# Patient Record
Sex: FEMALE | URBAN - METROPOLITAN AREA
[De-identification: names, ages, dates, MRNs, and addresses within clinical notes are randomized per-mention and may not be internally consistent; named-entity substitution may affect disease eponyms.]

---

## 2019-12-16 ENCOUNTER — EMERGENCY (EMERGENCY)
Facility: HOSPITAL | Age: 64
LOS: 1 days | Discharge: LEFT BEFORE DISCHARGE | End: 2019-12-16
Attending: EMERGENCY MEDICINE

## 2019-12-16 VITALS
HEART RATE: 116 BPM | OXYGEN SATURATION: 96 % | HEIGHT: 64 IN | SYSTOLIC BLOOD PRESSURE: 157 MMHG | WEIGHT: 130.07 LBS | RESPIRATION RATE: 20 BRPM | TEMPERATURE: 97 F | DIASTOLIC BLOOD PRESSURE: 91 MMHG

## 2019-12-16 NOTE — ED ADULT TRIAGE NOTE - CHIEF COMPLAINT QUOTE
Pt A&Ox4 states "It just started out all of a sudden."  BIBA c/o asthma exasperation. Patient given 2 duonebs and 125mg of solumedrol by EMS #18 to LAC from EMS. Patient denies COPD, Denies chest pain, patient states improvement, appears in no acute distress.

## 2022-12-13 ENCOUNTER — NON-APPOINTMENT (OUTPATIENT)
Age: 67
End: 2022-12-13

## 2023-03-05 ENCOUNTER — NON-APPOINTMENT (OUTPATIENT)
Age: 68
End: 2023-03-05

## 2023-04-18 ENCOUNTER — NON-APPOINTMENT (OUTPATIENT)
Age: 68
End: 2023-04-18

## 2023-07-19 ENCOUNTER — NON-APPOINTMENT (OUTPATIENT)
Age: 68
End: 2023-07-19

## 2024-01-01 ENCOUNTER — INPATIENT (INPATIENT)
Facility: HOSPITAL | Age: 69
LOS: 2 days | Discharge: HOME HEALTH | DRG: 189 | End: 2024-12-07
Attending: GENERAL ACUTE CARE HOSPITAL | Admitting: STUDENT IN AN ORGANIZED HEALTH CARE EDUCATION/TRAINING PROGRAM
Payer: COMMERCIAL

## 2024-01-01 ENCOUNTER — OUTPATIENT (OUTPATIENT)
Dept: OUTPATIENT SERVICES | Facility: HOSPITAL | Age: 69
LOS: 1 days | Discharge: HOME | End: 2024-01-01

## 2024-01-01 VITALS
OXYGEN SATURATION: 96 % | RESPIRATION RATE: 16 BRPM | DIASTOLIC BLOOD PRESSURE: 93 MMHG | HEART RATE: 79 BPM | TEMPERATURE: 98 F | SYSTOLIC BLOOD PRESSURE: 130 MMHG | HEIGHT: 63 IN

## 2024-01-01 VITALS
RESPIRATION RATE: 18 BRPM | SYSTOLIC BLOOD PRESSURE: 132 MMHG | HEART RATE: 92 BPM | TEMPERATURE: 98 F | DIASTOLIC BLOOD PRESSURE: 78 MMHG | OXYGEN SATURATION: 97 %

## 2024-01-01 DIAGNOSIS — J44.1 CHRONIC OBSTRUCTIVE PULMONARY DISEASE WITH (ACUTE) EXACERBATION: ICD-10-CM

## 2024-01-01 DIAGNOSIS — Z98.890 OTHER SPECIFIED POSTPROCEDURAL STATES: Chronic | ICD-10-CM

## 2024-01-01 DIAGNOSIS — D64.9 ANEMIA, UNSPECIFIED: ICD-10-CM

## 2024-01-01 LAB
ALBUMIN SERPL ELPH-MCNC: 3.2 G/DL — LOW (ref 3.3–5.2)
ALBUMIN SERPL ELPH-MCNC: 3.3 G/DL — SIGNIFICANT CHANGE UP (ref 3.3–5.2)
ALBUMIN SERPL ELPH-MCNC: 3.3 G/DL — SIGNIFICANT CHANGE UP (ref 3.3–5.2)
ALP SERPL-CCNC: 76 U/L — SIGNIFICANT CHANGE UP (ref 40–120)
ALP SERPL-CCNC: 78 U/L — SIGNIFICANT CHANGE UP (ref 40–120)
ALP SERPL-CCNC: 88 U/L — SIGNIFICANT CHANGE UP (ref 40–120)
ALT FLD-CCNC: 11 U/L — SIGNIFICANT CHANGE UP
ALT FLD-CCNC: 12 U/L — SIGNIFICANT CHANGE UP
ALT FLD-CCNC: 13 U/L — SIGNIFICANT CHANGE UP
ANION GAP SERPL CALC-SCNC: 11 MMOL/L — SIGNIFICANT CHANGE UP (ref 5–17)
ANION GAP SERPL CALC-SCNC: 12 MMOL/L — SIGNIFICANT CHANGE UP (ref 5–17)
ANION GAP SERPL CALC-SCNC: 15 MMOL/L — SIGNIFICANT CHANGE UP (ref 5–17)
ANISOCYTOSIS BLD QL: SLIGHT — SIGNIFICANT CHANGE UP
APTT BLD: 27.9 SEC — SIGNIFICANT CHANGE UP (ref 24.5–35.6)
AST SERPL-CCNC: 13 U/L — SIGNIFICANT CHANGE UP
AST SERPL-CCNC: 19 U/L — SIGNIFICANT CHANGE UP
AST SERPL-CCNC: 24 U/L — SIGNIFICANT CHANGE UP
BASOPHILS # BLD AUTO: 0.01 K/UL — SIGNIFICANT CHANGE UP (ref 0–0.2)
BASOPHILS # BLD AUTO: 0.01 K/UL — SIGNIFICANT CHANGE UP (ref 0–0.2)
BASOPHILS # BLD AUTO: 0.09 K/UL — SIGNIFICANT CHANGE UP (ref 0–0.2)
BASOPHILS NFR BLD AUTO: 0.1 % — SIGNIFICANT CHANGE UP (ref 0–2)
BASOPHILS NFR BLD AUTO: 0.1 % — SIGNIFICANT CHANGE UP (ref 0–2)
BASOPHILS NFR BLD AUTO: 0.9 % — SIGNIFICANT CHANGE UP (ref 0–2)
BILIRUB SERPL-MCNC: 0.3 MG/DL — LOW (ref 0.4–2)
BLD GP AB SCN SERPL QL: SIGNIFICANT CHANGE UP
BUN SERPL-MCNC: 15.5 MG/DL — SIGNIFICANT CHANGE UP (ref 8–20)
BUN SERPL-MCNC: 26.7 MG/DL — HIGH (ref 8–20)
BUN SERPL-MCNC: 28 MG/DL — HIGH (ref 8–20)
CALCIUM SERPL-MCNC: 9.1 MG/DL — SIGNIFICANT CHANGE UP (ref 8.4–10.5)
CALCIUM SERPL-MCNC: 9.4 MG/DL — SIGNIFICANT CHANGE UP (ref 8.4–10.5)
CALCIUM SERPL-MCNC: 9.5 MG/DL — SIGNIFICANT CHANGE UP (ref 8.4–10.5)
CHLORIDE SERPL-SCNC: 101 MMOL/L — SIGNIFICANT CHANGE UP (ref 96–108)
CHLORIDE SERPL-SCNC: 95 MMOL/L — LOW (ref 96–108)
CHLORIDE SERPL-SCNC: 98 MMOL/L — SIGNIFICANT CHANGE UP (ref 96–108)
CO2 SERPL-SCNC: 18 MMOL/L — LOW (ref 22–29)
CO2 SERPL-SCNC: 21 MMOL/L — LOW (ref 22–29)
CO2 SERPL-SCNC: 22 MMOL/L — SIGNIFICANT CHANGE UP (ref 22–29)
CREAT SERPL-MCNC: 0.49 MG/DL — LOW (ref 0.5–1.3)
CREAT SERPL-MCNC: 0.6 MG/DL — SIGNIFICANT CHANGE UP (ref 0.5–1.3)
CREAT SERPL-MCNC: 0.69 MG/DL — SIGNIFICANT CHANGE UP (ref 0.5–1.3)
CULTURE RESULTS: SIGNIFICANT CHANGE UP
DACRYOCYTES BLD QL SMEAR: SLIGHT — SIGNIFICANT CHANGE UP
EGFR: 101 ML/MIN/1.73M2 — SIGNIFICANT CHANGE UP
EGFR: 101 ML/MIN/1.73M2 — SIGNIFICANT CHANGE UP
EGFR: 93 ML/MIN/1.73M2 — SIGNIFICANT CHANGE UP
EGFR: 93 ML/MIN/1.73M2 — SIGNIFICANT CHANGE UP
EGFR: 96 ML/MIN/1.73M2 — SIGNIFICANT CHANGE UP
EGFR: 96 ML/MIN/1.73M2 — SIGNIFICANT CHANGE UP
EOSINOPHIL # BLD AUTO: 0 K/UL — SIGNIFICANT CHANGE UP (ref 0–0.5)
EOSINOPHIL NFR BLD AUTO: 0 % — SIGNIFICANT CHANGE UP (ref 0–6)
FLUAV AG NPH QL: SIGNIFICANT CHANGE UP
FLUBV AG NPH QL: SIGNIFICANT CHANGE UP
GAS PNL BLDA: SIGNIFICANT CHANGE UP
GLUCOSE SERPL-MCNC: 129 MG/DL — HIGH (ref 70–99)
GLUCOSE SERPL-MCNC: 156 MG/DL — HIGH (ref 70–99)
GLUCOSE SERPL-MCNC: 303 MG/DL — HIGH (ref 70–99)
HCT VFR BLD CALC: 28.4 % — LOW (ref 34.5–45)
HCT VFR BLD CALC: 29.6 % — LOW (ref 34.5–45)
HCT VFR BLD CALC: 32 % — LOW (ref 34.5–45)
HGB BLD-MCNC: 10 G/DL — LOW (ref 11.5–15.5)
HGB BLD-MCNC: 10.7 G/DL — LOW (ref 11.5–15.5)
HGB BLD-MCNC: 9.8 G/DL — LOW (ref 11.5–15.5)
IMM GRANULOCYTES NFR BLD AUTO: 1 % — HIGH (ref 0–0.9)
IMM GRANULOCYTES NFR BLD AUTO: 1.3 % — HIGH (ref 0–0.9)
INR BLD: 1.03 RATIO — SIGNIFICANT CHANGE UP (ref 0.85–1.16)
LEGIONELLA AG UR QL: NEGATIVE — SIGNIFICANT CHANGE UP
LYMPHOCYTES # BLD AUTO: 0.92 K/UL — LOW (ref 1–3.3)
LYMPHOCYTES # BLD AUTO: 1.14 K/UL — SIGNIFICANT CHANGE UP (ref 1–3.3)
LYMPHOCYTES # BLD AUTO: 10.9 % — LOW (ref 13–44)
LYMPHOCYTES # BLD AUTO: 11.5 % — LOW (ref 13–44)
LYMPHOCYTES # BLD AUTO: 4.42 K/UL — HIGH (ref 1–3.3)
LYMPHOCYTES # BLD AUTO: 45.2 % — HIGH (ref 13–44)
MANUAL SMEAR VERIFICATION: SIGNIFICANT CHANGE UP
MCHC RBC-ENTMCNC: 31.3 PG — SIGNIFICANT CHANGE UP (ref 27–34)
MCHC RBC-ENTMCNC: 31.8 PG — SIGNIFICANT CHANGE UP (ref 27–34)
MCHC RBC-ENTMCNC: 32.3 PG — SIGNIFICANT CHANGE UP (ref 27–34)
MCHC RBC-ENTMCNC: 33.4 G/DL — SIGNIFICANT CHANGE UP (ref 32–36)
MCHC RBC-ENTMCNC: 33.8 G/DL — SIGNIFICANT CHANGE UP (ref 32–36)
MCHC RBC-ENTMCNC: 34.5 G/DL — SIGNIFICANT CHANGE UP (ref 32–36)
MCV RBC AUTO: 92.5 FL — SIGNIFICANT CHANGE UP (ref 80–100)
MCV RBC AUTO: 93.7 FL — SIGNIFICANT CHANGE UP (ref 80–100)
MCV RBC AUTO: 95.2 FL — SIGNIFICANT CHANGE UP (ref 80–100)
MONOCYTES # BLD AUTO: 0.59 K/UL — SIGNIFICANT CHANGE UP (ref 0–0.9)
MONOCYTES # BLD AUTO: 0.68 K/UL — SIGNIFICANT CHANGE UP (ref 0–0.9)
MONOCYTES # BLD AUTO: 0.84 K/UL — SIGNIFICANT CHANGE UP (ref 0–0.9)
MONOCYTES NFR BLD AUTO: 7 % — SIGNIFICANT CHANGE UP (ref 2–14)
MONOCYTES NFR BLD AUTO: 7.4 % — SIGNIFICANT CHANGE UP (ref 2–14)
MONOCYTES NFR BLD AUTO: 8 % — SIGNIFICANT CHANGE UP (ref 2–14)
MRSA PCR RESULT.: SIGNIFICANT CHANGE UP
NEUTROPHILS # BLD AUTO: 3.99 K/UL — SIGNIFICANT CHANGE UP (ref 1.8–7.4)
NEUTROPHILS # BLD AUTO: 6.39 K/UL — SIGNIFICANT CHANGE UP (ref 1.8–7.4)
NEUTROPHILS # BLD AUTO: 8.33 K/UL — HIGH (ref 1.8–7.4)
NEUTROPHILS NFR BLD AUTO: 39.1 % — LOW (ref 43–77)
NEUTROPHILS NFR BLD AUTO: 79.7 % — HIGH (ref 43–77)
NEUTROPHILS NFR BLD AUTO: 80 % — HIGH (ref 43–77)
NEUTS BAND # BLD: 1.7 % — SIGNIFICANT CHANGE UP (ref 0–8)
NEUTS BAND NFR BLD: 1.7 % — SIGNIFICANT CHANGE UP (ref 0–8)
NT-PROBNP SERPL-SCNC: 1159 PG/ML — HIGH (ref 0–300)
OVALOCYTES BLD QL SMEAR: SLIGHT — SIGNIFICANT CHANGE UP
PLAT MORPH BLD: NORMAL — SIGNIFICANT CHANGE UP
PLATELET # BLD AUTO: 32 K/UL — LOW (ref 150–400)
PLATELET # BLD AUTO: 41 K/UL — LOW (ref 150–400)
PLATELET # BLD AUTO: 54 K/UL — LOW (ref 150–400)
POIKILOCYTOSIS BLD QL AUTO: SLIGHT — SIGNIFICANT CHANGE UP
POLYCHROMASIA BLD QL SMEAR: SLIGHT — SIGNIFICANT CHANGE UP
POTASSIUM SERPL-MCNC: 4.8 MMOL/L — SIGNIFICANT CHANGE UP (ref 3.5–5.3)
POTASSIUM SERPL-MCNC: 4.9 MMOL/L — SIGNIFICANT CHANGE UP (ref 3.5–5.3)
POTASSIUM SERPL-MCNC: 5.2 MMOL/L — SIGNIFICANT CHANGE UP (ref 3.5–5.3)
POTASSIUM SERPL-SCNC: 4.8 MMOL/L — SIGNIFICANT CHANGE UP (ref 3.5–5.3)
POTASSIUM SERPL-SCNC: 4.9 MMOL/L — SIGNIFICANT CHANGE UP (ref 3.5–5.3)
POTASSIUM SERPL-SCNC: 5.2 MMOL/L — SIGNIFICANT CHANGE UP (ref 3.5–5.3)
PROT SERPL-MCNC: 6 G/DL — LOW (ref 6.6–8.7)
PROT SERPL-MCNC: 6.5 G/DL — LOW (ref 6.6–8.7)
PROT SERPL-MCNC: 6.6 G/DL — SIGNIFICANT CHANGE UP (ref 6.6–8.7)
PROTHROM AB SERPL-ACNC: 11.6 SEC — SIGNIFICANT CHANGE UP (ref 9.9–13.4)
RBC # BLD: 3.03 M/UL — LOW (ref 3.8–5.2)
RBC # BLD: 3.2 M/UL — LOW (ref 3.8–5.2)
RBC # BLD: 3.36 M/UL — LOW (ref 3.8–5.2)
RBC # FLD: 21.5 % — HIGH (ref 10.3–14.5)
RBC # FLD: 21.6 % — HIGH (ref 10.3–14.5)
RBC # FLD: 21.9 % — HIGH (ref 10.3–14.5)
RBC BLD AUTO: ABNORMAL
RSV RNA NPH QL NAA+NON-PROBE: SIGNIFICANT CHANGE UP
S AUREUS DNA NOSE QL NAA+PROBE: SIGNIFICANT CHANGE UP
S PNEUM AG UR QL: NEGATIVE — SIGNIFICANT CHANGE UP
SARS-COV-2 RNA SPEC QL NAA+PROBE: SIGNIFICANT CHANGE UP
SODIUM SERPL-SCNC: 128 MMOL/L — LOW (ref 135–145)
SODIUM SERPL-SCNC: 132 MMOL/L — LOW (ref 135–145)
SODIUM SERPL-SCNC: 133 MMOL/L — LOW (ref 135–145)
SPECIMEN SOURCE: SIGNIFICANT CHANGE UP
TROPONIN T, HIGH SENSITIVITY RESULT: 32 NG/L — SIGNIFICANT CHANGE UP (ref 0–51)
VARIANT LYMPHS # BLD: 6.1 % — HIGH (ref 0–6)
VARIANT LYMPHS NFR BLD MANUAL: 6.1 % — HIGH (ref 0–6)
WBC # BLD: 10.46 K/UL — SIGNIFICANT CHANGE UP (ref 3.8–10.5)
WBC # BLD: 7.99 K/UL — SIGNIFICANT CHANGE UP (ref 3.8–10.5)
WBC # BLD: 9.78 K/UL — SIGNIFICANT CHANGE UP (ref 3.8–10.5)
WBC # FLD AUTO: 10.46 K/UL — SIGNIFICANT CHANGE UP (ref 3.8–10.5)
WBC # FLD AUTO: 7.99 K/UL — SIGNIFICANT CHANGE UP (ref 3.8–10.5)
WBC # FLD AUTO: 9.78 K/UL — SIGNIFICANT CHANGE UP (ref 3.8–10.5)

## 2024-01-01 PROCEDURE — 94640 AIRWAY INHALATION TREATMENT: CPT

## 2024-01-01 PROCEDURE — 87040 BLOOD CULTURE FOR BACTERIA: CPT

## 2024-01-01 PROCEDURE — 87641 MR-STAPH DNA AMP PROBE: CPT

## 2024-01-01 PROCEDURE — 86850 RBC ANTIBODY SCREEN: CPT

## 2024-01-01 PROCEDURE — 82947 ASSAY GLUCOSE BLOOD QUANT: CPT

## 2024-01-01 PROCEDURE — 94760 N-INVAS EAR/PLS OXIMETRY 1: CPT

## 2024-01-01 PROCEDURE — 71045 X-RAY EXAM CHEST 1 VIEW: CPT | Mod: 26

## 2024-01-01 PROCEDURE — 83605 ASSAY OF LACTIC ACID: CPT

## 2024-01-01 PROCEDURE — 84295 ASSAY OF SERUM SODIUM: CPT

## 2024-01-01 PROCEDURE — 76705 ECHO EXAM OF ABDOMEN: CPT | Mod: 26

## 2024-01-01 PROCEDURE — 99223 1ST HOSP IP/OBS HIGH 75: CPT

## 2024-01-01 PROCEDURE — 86901 BLOOD TYPING SEROLOGIC RH(D): CPT

## 2024-01-01 PROCEDURE — 85730 THROMBOPLASTIN TIME PARTIAL: CPT

## 2024-01-01 PROCEDURE — 87899 AGENT NOS ASSAY W/OPTIC: CPT

## 2024-01-01 PROCEDURE — 87640 STAPH A DNA AMP PROBE: CPT

## 2024-01-01 PROCEDURE — 99222 1ST HOSP IP/OBS MODERATE 55: CPT | Mod: GC

## 2024-01-01 PROCEDURE — 71045 X-RAY EXAM CHEST 1 VIEW: CPT

## 2024-01-01 PROCEDURE — 85025 COMPLETE CBC W/AUTO DIFF WBC: CPT

## 2024-01-01 PROCEDURE — 94660 CPAP INITIATION&MGMT: CPT

## 2024-01-01 PROCEDURE — 99239 HOSP IP/OBS DSCHRG MGMT >30: CPT

## 2024-01-01 PROCEDURE — 76705 ECHO EXAM OF ABDOMEN: CPT

## 2024-01-01 PROCEDURE — 71046 X-RAY EXAM CHEST 2 VIEWS: CPT

## 2024-01-01 PROCEDURE — 96375 TX/PRO/DX INJ NEW DRUG ADDON: CPT

## 2024-01-01 PROCEDURE — 82435 ASSAY OF BLOOD CHLORIDE: CPT

## 2024-01-01 PROCEDURE — 82803 BLOOD GASES ANY COMBINATION: CPT

## 2024-01-01 PROCEDURE — 80053 COMPREHEN METABOLIC PANEL: CPT

## 2024-01-01 PROCEDURE — 86900 BLOOD TYPING SEROLOGIC ABO: CPT

## 2024-01-01 PROCEDURE — 87449 NOS EACH ORGANISM AG IA: CPT

## 2024-01-01 PROCEDURE — 85014 HEMATOCRIT: CPT

## 2024-01-01 PROCEDURE — 83880 ASSAY OF NATRIURETIC PEPTIDE: CPT

## 2024-01-01 PROCEDURE — 71046 X-RAY EXAM CHEST 2 VIEWS: CPT | Mod: 26

## 2024-01-01 PROCEDURE — 99291 CRITICAL CARE FIRST HOUR: CPT

## 2024-01-01 PROCEDURE — 99233 SBSQ HOSP IP/OBS HIGH 50: CPT

## 2024-01-01 PROCEDURE — 36600 WITHDRAWAL OF ARTERIAL BLOOD: CPT

## 2024-01-01 PROCEDURE — 99232 SBSQ HOSP IP/OBS MODERATE 35: CPT

## 2024-01-01 PROCEDURE — 87637 SARSCOV2&INF A&B&RSV AMP PRB: CPT

## 2024-01-01 PROCEDURE — 84484 ASSAY OF TROPONIN QUANT: CPT

## 2024-01-01 PROCEDURE — 93005 ELECTROCARDIOGRAM TRACING: CPT

## 2024-01-01 PROCEDURE — 96374 THER/PROPH/DIAG INJ IV PUSH: CPT

## 2024-01-01 PROCEDURE — 82330 ASSAY OF CALCIUM: CPT

## 2024-01-01 PROCEDURE — 85018 HEMOGLOBIN: CPT

## 2024-01-01 PROCEDURE — 84132 ASSAY OF SERUM POTASSIUM: CPT

## 2024-01-01 PROCEDURE — 85610 PROTHROMBIN TIME: CPT

## 2024-01-01 PROCEDURE — 36415 COLL VENOUS BLD VENIPUNCTURE: CPT

## 2024-01-01 RX ORDER — METHYLPREDNISOLONE ACETATE 80 MG/ML
40 INJECTION, SUSPENSION INTRA-ARTICULAR; INTRALESIONAL; INTRAMUSCULAR; SOFT TISSUE EVERY 12 HOURS
Refills: 0 | Status: DISCONTINUED | OUTPATIENT
Start: 2024-01-01 | End: 2024-01-01

## 2024-01-01 RX ORDER — PREDNISONE 20 MG/1
40 TABLET ORAL DAILY
Refills: 0 | Status: DISCONTINUED | OUTPATIENT
Start: 2024-01-01 | End: 2024-01-01

## 2024-01-01 RX ORDER — NICOTINE POLACRILEX 4 MG/1
1 GUM, CHEWING ORAL DAILY
Refills: 0 | Status: DISCONTINUED | OUTPATIENT
Start: 2024-01-01 | End: 2024-01-01

## 2024-01-01 RX ORDER — AMOXICILLIN AND CLAVULANATE POTASSIUM 500; 125 MG/1; MG/1
1 TABLET, FILM COATED ORAL
Qty: 12 | Refills: 0
Start: 2024-01-01 | End: 2024-01-01

## 2024-01-01 RX ORDER — PREDNISONE 20 MG/1
1 TABLET ORAL
Qty: 30 | Refills: 0
Start: 2024-01-01 | End: 2024-01-01

## 2024-01-01 RX ORDER — METHYLPREDNISOLONE ACETATE 80 MG/ML
40 INJECTION, SUSPENSION INTRA-ARTICULAR; INTRALESIONAL; INTRAMUSCULAR; SOFT TISSUE EVERY 8 HOURS
Refills: 0 | Status: DISCONTINUED | OUTPATIENT
Start: 2024-01-01 | End: 2024-01-01

## 2024-01-01 RX ORDER — AZITHROMYCIN 250 MG
500 CAPSULE ORAL ONCE
Refills: 0 | Status: COMPLETED | OUTPATIENT
Start: 2024-01-01 | End: 2024-01-01

## 2024-01-01 RX ORDER — LOSARTAN POTASSIUM 100 MG/1
12.5 TABLET, FILM COATED ORAL DAILY
Refills: 0 | Status: DISCONTINUED | OUTPATIENT
Start: 2024-01-01 | End: 2024-01-01

## 2024-01-01 RX ORDER — PREDNISONE 20 MG/1
20 TABLET ORAL DAILY
Refills: 0 | Status: CANCELLED | OUTPATIENT
Start: 2024-01-01 | End: 2024-01-01

## 2024-01-01 RX ORDER — METHYLPREDNISOLONE ACETATE 80 MG/ML
125 INJECTION, SUSPENSION INTRA-ARTICULAR; INTRALESIONAL; INTRAMUSCULAR; SOFT TISSUE ONCE
Refills: 0 | Status: COMPLETED | OUTPATIENT
Start: 2024-01-01 | End: 2024-01-01

## 2024-01-01 RX ORDER — MAGNESIUM SULFATE 500 MG/ML
2 SYRINGE (ML) INJECTION ONCE
Refills: 0 | Status: COMPLETED | OUTPATIENT
Start: 2024-01-01 | End: 2024-01-01

## 2024-01-01 RX ORDER — AZITHROMYCIN 250 MG
1 CAPSULE ORAL
Qty: 4 | Refills: 0
Start: 2024-01-01 | End: 2024-01-01

## 2024-01-01 RX ORDER — TIOTROPIUM BROMIDE INHALATION SPRAY 3.12 UG/1
2 SPRAY, METERED RESPIRATORY (INHALATION)
Qty: 1 | Refills: 0
Start: 2024-01-01 | End: 2025-01-01

## 2024-01-01 RX ORDER — ENOXAPARIN SODIUM 100 MG/ML
40 INJECTION SUBCUTANEOUS EVERY 24 HOURS
Refills: 0 | Status: DISCONTINUED | OUTPATIENT
Start: 2024-01-01 | End: 2024-01-01

## 2024-01-01 RX ORDER — ALPRAZOLAM 0.5 MG
0.25 TABLET, EXTENDED RELEASE 24 HR ORAL ONCE
Refills: 0 | Status: DISCONTINUED | OUTPATIENT
Start: 2024-01-01 | End: 2024-01-01

## 2024-01-01 RX ORDER — IPRATROPIUM BROMIDE AND ALBUTEROL SULFATE .5; 2.5 MG/3ML; MG/3ML
3 SOLUTION RESPIRATORY (INHALATION)
Qty: 168 | Refills: 0
Start: 2024-01-01 | End: 2024-01-01

## 2024-01-01 RX ORDER — IPRATROPIUM BROMIDE AND ALBUTEROL SULFATE .5; 2.5 MG/3ML; MG/3ML
3 SOLUTION RESPIRATORY (INHALATION) ONCE
Refills: 0 | Status: COMPLETED | OUTPATIENT
Start: 2024-01-01 | End: 2024-01-01

## 2024-01-01 RX ORDER — TIOTROPIUM BROMIDE INHALATION SPRAY 3.12 UG/1
2 SPRAY, METERED RESPIRATORY (INHALATION) DAILY
Refills: 0 | Status: DISCONTINUED | OUTPATIENT
Start: 2024-01-01 | End: 2024-01-01

## 2024-01-01 RX ORDER — IPRATROPIUM BROMIDE AND ALBUTEROL SULFATE .5; 2.5 MG/3ML; MG/3ML
9 SOLUTION RESPIRATORY (INHALATION) ONCE
Refills: 0 | Status: DISCONTINUED | OUTPATIENT
Start: 2024-01-01 | End: 2024-01-01

## 2024-01-01 RX ORDER — CEFEPIME 2 G/20ML
2000 INJECTION, POWDER, FOR SOLUTION INTRAVENOUS EVERY 12 HOURS
Refills: 0 | Status: DISCONTINUED | OUTPATIENT
Start: 2024-01-01 | End: 2024-01-01

## 2024-01-01 RX ORDER — PREDNISONE 20 MG/1
30 TABLET ORAL DAILY
Refills: 0 | Status: CANCELLED | OUTPATIENT
Start: 2024-01-01 | End: 2024-01-01

## 2024-01-01 RX ORDER — CEFPODOXIME PROXETIL 200 MG/1
1 TABLET, FILM COATED ORAL
Qty: 12 | Refills: 0
Start: 2024-01-01 | End: 2024-01-01

## 2024-01-01 RX ORDER — ALPRAZOLAM 0.5 MG
1 TABLET, EXTENDED RELEASE 24 HR ORAL
Qty: 3 | Refills: 0
Start: 2024-01-01 | End: 2024-01-01

## 2024-01-01 RX ORDER — IPRATROPIUM BROMIDE AND ALBUTEROL SULFATE .5; 2.5 MG/3ML; MG/3ML
3 SOLUTION RESPIRATORY (INHALATION) EVERY 6 HOURS
Refills: 0 | Status: DISCONTINUED | OUTPATIENT
Start: 2024-01-01 | End: 2024-01-01

## 2024-01-01 RX ORDER — PREDNISONE 20 MG/1
TABLET ORAL
Refills: 0 | Status: DISCONTINUED | OUTPATIENT
Start: 2024-01-01 | End: 2024-01-01

## 2024-01-01 RX ORDER — ONDANSETRON HCL/PF 4 MG/2 ML
4 VIAL (ML) INJECTION EVERY 8 HOURS
Refills: 0 | Status: DISCONTINUED | OUTPATIENT
Start: 2024-01-01 | End: 2024-01-01

## 2024-01-01 RX ORDER — SPIRONOLACTONE 25 MG
12.5 TABLET ORAL DAILY
Refills: 0 | Status: DISCONTINUED | OUTPATIENT
Start: 2024-01-01 | End: 2024-01-01

## 2024-01-01 RX ORDER — CARVEDILOL 3.12 MG/1
6.25 TABLET, FILM COATED ORAL EVERY 12 HOURS
Refills: 0 | Status: DISCONTINUED | OUTPATIENT
Start: 2024-01-01 | End: 2024-01-01

## 2024-01-01 RX ORDER — ALBUTEROL SULFATE 2.5 MG/3ML
2 VIAL, NEBULIZER (ML) INHALATION EVERY 6 HOURS
Refills: 0 | Status: DISCONTINUED | OUTPATIENT
Start: 2024-01-01 | End: 2024-01-01

## 2024-01-01 RX ORDER — BUDESONIDE AND FORMOTEROL FUMARATE DIHYDRATE 80; 4.5 UG/1; UG/1
2 AEROSOL RESPIRATORY (INHALATION)
Qty: 1 | Refills: 0
Start: 2024-01-01 | End: 2025-01-01

## 2024-01-01 RX ORDER — PREDNISONE 20 MG/1
10 TABLET ORAL DAILY
Refills: 0 | Status: CANCELLED | OUTPATIENT
Start: 2024-01-01 | End: 2024-01-01

## 2024-01-01 RX ORDER — ACETAMINOPHEN 500 MG/5ML
650 LIQUID (ML) ORAL EVERY 6 HOURS
Refills: 0 | Status: DISCONTINUED | OUTPATIENT
Start: 2024-01-01 | End: 2024-01-01

## 2024-01-01 RX ORDER — CEFEPIME 2 G/20ML
2000 INJECTION, POWDER, FOR SOLUTION INTRAVENOUS ONCE
Refills: 0 | Status: COMPLETED | OUTPATIENT
Start: 2024-01-01 | End: 2024-01-01

## 2024-01-01 RX ORDER — VANCOMYCIN HCL IN 5 % DEXTROSE 1.5G/250ML
1000 PLASTIC BAG, INJECTION (ML) INTRAVENOUS ONCE
Refills: 0 | Status: COMPLETED | OUTPATIENT
Start: 2024-01-01 | End: 2024-01-01

## 2024-01-01 RX ORDER — MELATONIN 5 MG
3 TABLET ORAL AT BEDTIME
Refills: 0 | Status: DISCONTINUED | OUTPATIENT
Start: 2024-01-01 | End: 2024-01-01

## 2024-01-01 RX ADMIN — Medication 250 MILLIGRAM(S): at 09:58

## 2024-01-01 RX ADMIN — Medication 0.25 MILLIGRAM(S): at 21:24

## 2024-01-01 RX ADMIN — Medication 1 DOSE(S): at 10:46

## 2024-01-01 RX ADMIN — METHYLPREDNISOLONE ACETATE 40 MILLIGRAM(S): 80 INJECTION, SUSPENSION INTRA-ARTICULAR; INTRALESIONAL; INTRAMUSCULAR; SOFT TISSUE at 17:22

## 2024-01-01 RX ADMIN — CARVEDILOL 6.25 MILLIGRAM(S): 3.12 TABLET, FILM COATED ORAL at 17:27

## 2024-01-01 RX ADMIN — CARVEDILOL 6.25 MILLIGRAM(S): 3.12 TABLET, FILM COATED ORAL at 05:52

## 2024-01-01 RX ADMIN — ENOXAPARIN SODIUM 40 MILLIGRAM(S): 100 INJECTION SUBCUTANEOUS at 11:22

## 2024-01-01 RX ADMIN — Medication 12.5 MILLIGRAM(S): at 05:38

## 2024-01-01 RX ADMIN — METHYLPREDNISOLONE ACETATE 40 MILLIGRAM(S): 80 INJECTION, SUSPENSION INTRA-ARTICULAR; INTRALESIONAL; INTRAMUSCULAR; SOFT TISSUE at 05:51

## 2024-01-01 RX ADMIN — LOSARTAN POTASSIUM 12.5 MILLIGRAM(S): 100 TABLET, FILM COATED ORAL at 05:38

## 2024-01-01 RX ADMIN — CEFEPIME 2000 MILLIGRAM(S): 2 INJECTION, POWDER, FOR SOLUTION INTRAVENOUS at 05:51

## 2024-01-01 RX ADMIN — CARVEDILOL 6.25 MILLIGRAM(S): 3.12 TABLET, FILM COATED ORAL at 17:22

## 2024-01-01 RX ADMIN — NICOTINE POLACRILEX 1 PATCH: 4 GUM, CHEWING ORAL at 12:03

## 2024-01-01 RX ADMIN — PREDNISONE 40 MILLIGRAM(S): 20 TABLET ORAL at 05:18

## 2024-01-01 RX ADMIN — IPRATROPIUM BROMIDE AND ALBUTEROL SULFATE 3 MILLILITER(S): .5; 2.5 SOLUTION RESPIRATORY (INHALATION) at 16:02

## 2024-01-01 RX ADMIN — NICOTINE POLACRILEX 1 PATCH: 4 GUM, CHEWING ORAL at 19:40

## 2024-01-01 RX ADMIN — NICOTINE POLACRILEX 1 PATCH: 4 GUM, CHEWING ORAL at 07:33

## 2024-01-01 RX ADMIN — IPRATROPIUM BROMIDE AND ALBUTEROL SULFATE 3 MILLILITER(S): .5; 2.5 SOLUTION RESPIRATORY (INHALATION) at 08:53

## 2024-01-01 RX ADMIN — CEFEPIME 2000 MILLIGRAM(S): 2 INJECTION, POWDER, FOR SOLUTION INTRAVENOUS at 18:34

## 2024-01-01 RX ADMIN — METHYLPREDNISOLONE ACETATE 40 MILLIGRAM(S): 80 INJECTION, SUSPENSION INTRA-ARTICULAR; INTRALESIONAL; INTRAMUSCULAR; SOFT TISSUE at 12:36

## 2024-01-01 RX ADMIN — NICOTINE POLACRILEX 1 PATCH: 4 GUM, CHEWING ORAL at 19:14

## 2024-01-01 RX ADMIN — Medication 150 GRAM(S): at 08:17

## 2024-01-01 RX ADMIN — ENOXAPARIN SODIUM 40 MILLIGRAM(S): 100 INJECTION SUBCUTANEOUS at 12:04

## 2024-01-01 RX ADMIN — IPRATROPIUM BROMIDE AND ALBUTEROL SULFATE 3 MILLILITER(S): .5; 2.5 SOLUTION RESPIRATORY (INHALATION) at 13:31

## 2024-01-01 RX ADMIN — Medication 1 DOSE(S): at 08:36

## 2024-01-01 RX ADMIN — CEFEPIME 2000 MILLIGRAM(S): 2 INJECTION, POWDER, FOR SOLUTION INTRAVENOUS at 17:27

## 2024-01-01 RX ADMIN — CARVEDILOL 6.25 MILLIGRAM(S): 3.12 TABLET, FILM COATED ORAL at 05:19

## 2024-01-01 RX ADMIN — Medication 255 MILLIGRAM(S): at 09:57

## 2024-01-01 RX ADMIN — ENOXAPARIN SODIUM 40 MILLIGRAM(S): 100 INJECTION SUBCUTANEOUS at 12:37

## 2024-01-01 RX ADMIN — METHYLPREDNISOLONE ACETATE 40 MILLIGRAM(S): 80 INJECTION, SUSPENSION INTRA-ARTICULAR; INTRALESIONAL; INTRAMUSCULAR; SOFT TISSUE at 23:16

## 2024-01-01 RX ADMIN — Medication 12.5 MILLIGRAM(S): at 05:52

## 2024-01-01 RX ADMIN — CEFEPIME 2000 MILLIGRAM(S): 2 INJECTION, POWDER, FOR SOLUTION INTRAVENOUS at 09:57

## 2024-01-01 RX ADMIN — Medication 0.25 MILLIGRAM(S): at 22:34

## 2024-01-01 RX ADMIN — NICOTINE POLACRILEX 1 PATCH: 4 GUM, CHEWING ORAL at 12:42

## 2024-01-01 RX ADMIN — IPRATROPIUM BROMIDE AND ALBUTEROL SULFATE 3 MILLILITER(S): .5; 2.5 SOLUTION RESPIRATORY (INHALATION) at 08:34

## 2024-01-01 RX ADMIN — IPRATROPIUM BROMIDE AND ALBUTEROL SULFATE 3 MILLILITER(S): .5; 2.5 SOLUTION RESPIRATORY (INHALATION) at 22:03

## 2024-01-01 RX ADMIN — NICOTINE POLACRILEX 1 PATCH: 4 GUM, CHEWING ORAL at 11:23

## 2024-01-01 RX ADMIN — LOSARTAN POTASSIUM 12.5 MILLIGRAM(S): 100 TABLET, FILM COATED ORAL at 05:18

## 2024-01-01 RX ADMIN — NICOTINE POLACRILEX 1 PATCH: 4 GUM, CHEWING ORAL at 19:00

## 2024-01-01 RX ADMIN — CEFEPIME 2000 MILLIGRAM(S): 2 INJECTION, POWDER, FOR SOLUTION INTRAVENOUS at 05:18

## 2024-01-01 RX ADMIN — IPRATROPIUM BROMIDE AND ALBUTEROL SULFATE 3 MILLILITER(S): .5; 2.5 SOLUTION RESPIRATORY (INHALATION) at 08:10

## 2024-01-01 RX ADMIN — NICOTINE POLACRILEX 1 PATCH: 4 GUM, CHEWING ORAL at 12:04

## 2024-01-01 RX ADMIN — Medication 12.5 MILLIGRAM(S): at 05:18

## 2024-01-01 RX ADMIN — IPRATROPIUM BROMIDE AND ALBUTEROL SULFATE 3 MILLILITER(S): .5; 2.5 SOLUTION RESPIRATORY (INHALATION) at 14:43

## 2024-01-01 RX ADMIN — CARVEDILOL 6.25 MILLIGRAM(S): 3.12 TABLET, FILM COATED ORAL at 18:34

## 2024-01-01 RX ADMIN — CEFEPIME 2000 MILLIGRAM(S): 2 INJECTION, POWDER, FOR SOLUTION INTRAVENOUS at 17:22

## 2024-01-01 RX ADMIN — CEFEPIME 2000 MILLIGRAM(S): 2 INJECTION, POWDER, FOR SOLUTION INTRAVENOUS at 05:38

## 2024-01-01 RX ADMIN — Medication 1 DOSE(S): at 08:01

## 2024-01-01 RX ADMIN — METHYLPREDNISOLONE ACETATE 40 MILLIGRAM(S): 80 INJECTION, SUSPENSION INTRA-ARTICULAR; INTRALESIONAL; INTRAMUSCULAR; SOFT TISSUE at 05:38

## 2024-01-01 RX ADMIN — IPRATROPIUM BROMIDE AND ALBUTEROL SULFATE 3 MILLILITER(S): .5; 2.5 SOLUTION RESPIRATORY (INHALATION) at 21:03

## 2024-01-01 RX ADMIN — NICOTINE POLACRILEX 1 PATCH: 4 GUM, CHEWING ORAL at 07:51

## 2024-01-01 RX ADMIN — IPRATROPIUM BROMIDE AND ALBUTEROL SULFATE 3 MILLILITER(S): .5; 2.5 SOLUTION RESPIRATORY (INHALATION) at 20:14

## 2024-01-01 RX ADMIN — Medication 1 DOSE(S): at 22:03

## 2024-01-01 RX ADMIN — NICOTINE POLACRILEX 1 PATCH: 4 GUM, CHEWING ORAL at 12:36

## 2024-01-01 RX ADMIN — NICOTINE POLACRILEX 1 PATCH: 4 GUM, CHEWING ORAL at 07:30

## 2024-01-01 RX ADMIN — ENOXAPARIN SODIUM 40 MILLIGRAM(S): 100 INJECTION SUBCUTANEOUS at 12:40

## 2024-01-01 RX ADMIN — CARVEDILOL 6.25 MILLIGRAM(S): 3.12 TABLET, FILM COATED ORAL at 05:38

## 2024-01-01 RX ADMIN — Medication 3 MILLIGRAM(S): at 23:45

## 2024-01-01 RX ADMIN — NICOTINE POLACRILEX 1 PATCH: 4 GUM, CHEWING ORAL at 12:12

## 2024-01-01 RX ADMIN — NICOTINE POLACRILEX 1 PATCH: 4 GUM, CHEWING ORAL at 12:00

## 2024-01-01 RX ADMIN — METHYLPREDNISOLONE ACETATE 125 MILLIGRAM(S): 80 INJECTION, SUSPENSION INTRA-ARTICULAR; INTRALESIONAL; INTRAMUSCULAR; SOFT TISSUE at 08:17

## 2024-01-01 RX ADMIN — LOSARTAN POTASSIUM 12.5 MILLIGRAM(S): 100 TABLET, FILM COATED ORAL at 05:52

## 2024-01-01 RX ADMIN — IPRATROPIUM BROMIDE AND ALBUTEROL SULFATE 3 MILLILITER(S): .5; 2.5 SOLUTION RESPIRATORY (INHALATION) at 08:31

## 2024-01-01 RX ADMIN — METHYLPREDNISOLONE ACETATE 40 MILLIGRAM(S): 80 INJECTION, SUSPENSION INTRA-ARTICULAR; INTRALESIONAL; INTRAMUSCULAR; SOFT TISSUE at 12:40

## 2024-05-14 ENCOUNTER — NON-APPOINTMENT (OUTPATIENT)
Age: 69
End: 2024-05-14

## 2024-07-25 ENCOUNTER — APPOINTMENT (OUTPATIENT)
Dept: INTERNAL MEDICINE | Facility: CLINIC | Age: 69
End: 2024-07-25

## 2024-07-25 ENCOUNTER — TRANSCRIPTION ENCOUNTER (OUTPATIENT)
Age: 69
End: 2024-07-25

## 2024-07-25 DIAGNOSIS — K59.00 CONSTIPATION, UNSPECIFIED: ICD-10-CM

## 2024-07-25 DIAGNOSIS — Z87.2 PERSONAL HISTORY OF DISEASES OF THE SKIN AND SUBCUTANEOUS TISSUE: ICD-10-CM

## 2024-07-25 DIAGNOSIS — Z83.79 FAMILY HISTORY OF OTHER DISEASES OF THE DIGESTIVE SYSTEM: ICD-10-CM

## 2024-07-25 DIAGNOSIS — K56.41 FECAL IMPACTION: ICD-10-CM

## 2024-07-25 PROBLEM — Z00.00 ENCOUNTER FOR PREVENTIVE HEALTH EXAMINATION: Status: ACTIVE | Noted: 2024-07-25

## 2024-07-25 PROCEDURE — 99203 OFFICE O/P NEW LOW 30 MIN: CPT | Mod: 95

## 2024-07-26 ENCOUNTER — EMERGENCY (EMERGENCY)
Facility: HOSPITAL | Age: 69
LOS: 1 days | Discharge: HOME | End: 2024-07-26
Attending: EMERGENCY MEDICINE
Payer: COMMERCIAL

## 2024-07-26 VITALS
HEART RATE: 110 BPM | OXYGEN SATURATION: 94 % | SYSTOLIC BLOOD PRESSURE: 126 MMHG | TEMPERATURE: 99 F | RESPIRATION RATE: 18 BRPM | DIASTOLIC BLOOD PRESSURE: 72 MMHG

## 2024-07-26 VITALS
RESPIRATION RATE: 18 BRPM | HEART RATE: 115 BPM | DIASTOLIC BLOOD PRESSURE: 86 MMHG | SYSTOLIC BLOOD PRESSURE: 150 MMHG | HEIGHT: 64 IN | WEIGHT: 117.29 LBS | TEMPERATURE: 98 F | OXYGEN SATURATION: 99 %

## 2024-07-26 LAB
ALBUMIN SERPL ELPH-MCNC: 3.3 G/DL — SIGNIFICANT CHANGE UP (ref 3.3–5.2)
ALP SERPL-CCNC: 88 U/L — SIGNIFICANT CHANGE UP (ref 40–120)
ALT FLD-CCNC: 11 U/L — SIGNIFICANT CHANGE UP
AMMONIA BLD-MCNC: 12 UMOL/L — SIGNIFICANT CHANGE UP (ref 11–55)
ANION GAP SERPL CALC-SCNC: 13 MMOL/L — SIGNIFICANT CHANGE UP (ref 5–17)
APTT BLD: 29.9 SEC — SIGNIFICANT CHANGE UP (ref 24.5–35.6)
AST SERPL-CCNC: 28 U/L — SIGNIFICANT CHANGE UP
BASOPHILS # BLD AUTO: 0.05 K/UL — SIGNIFICANT CHANGE UP (ref 0–0.2)
BASOPHILS NFR BLD AUTO: 0.6 % — SIGNIFICANT CHANGE UP (ref 0–2)
BILIRUB SERPL-MCNC: 0.5 MG/DL — SIGNIFICANT CHANGE UP (ref 0.4–2)
BUN SERPL-MCNC: 6.7 MG/DL — LOW (ref 8–20)
CALCIUM SERPL-MCNC: 9.6 MG/DL — SIGNIFICANT CHANGE UP (ref 8.4–10.5)
CHLORIDE SERPL-SCNC: 94 MMOL/L — LOW (ref 96–108)
CO2 SERPL-SCNC: 28 MMOL/L — SIGNIFICANT CHANGE UP (ref 22–29)
CREAT SERPL-MCNC: 0.47 MG/DL — LOW (ref 0.5–1.3)
EGFR: 102 ML/MIN/1.73M2 — SIGNIFICANT CHANGE UP
EOSINOPHIL # BLD AUTO: 0.16 K/UL — SIGNIFICANT CHANGE UP (ref 0–0.5)
EOSINOPHIL NFR BLD AUTO: 1.8 % — SIGNIFICANT CHANGE UP (ref 0–6)
GLUCOSE SERPL-MCNC: 90 MG/DL — SIGNIFICANT CHANGE UP (ref 70–99)
HCT VFR BLD CALC: 42.5 % — SIGNIFICANT CHANGE UP (ref 34.5–45)
HGB BLD-MCNC: 14.1 G/DL — SIGNIFICANT CHANGE UP (ref 11.5–15.5)
IMM GRANULOCYTES NFR BLD AUTO: 0.3 % — SIGNIFICANT CHANGE UP (ref 0–0.9)
INR BLD: 0.98 RATIO — SIGNIFICANT CHANGE UP (ref 0.85–1.18)
LYMPHOCYTES # BLD AUTO: 0.85 K/UL — LOW (ref 1–3.3)
LYMPHOCYTES # BLD AUTO: 9.6 % — LOW (ref 13–44)
MCHC RBC-ENTMCNC: 31.3 PG — SIGNIFICANT CHANGE UP (ref 27–34)
MCHC RBC-ENTMCNC: 33.2 GM/DL — SIGNIFICANT CHANGE UP (ref 32–36)
MCV RBC AUTO: 94.2 FL — SIGNIFICANT CHANGE UP (ref 80–100)
MONOCYTES # BLD AUTO: 0.84 K/UL — SIGNIFICANT CHANGE UP (ref 0–0.9)
MONOCYTES NFR BLD AUTO: 9.5 % — SIGNIFICANT CHANGE UP (ref 2–14)
NEUTROPHILS # BLD AUTO: 6.93 K/UL — SIGNIFICANT CHANGE UP (ref 1.8–7.4)
NEUTROPHILS NFR BLD AUTO: 78.2 % — HIGH (ref 43–77)
PLATELET # BLD AUTO: 547 K/UL — HIGH (ref 150–400)
POTASSIUM SERPL-MCNC: 4.9 MMOL/L — SIGNIFICANT CHANGE UP (ref 3.5–5.3)
POTASSIUM SERPL-SCNC: 4.9 MMOL/L — SIGNIFICANT CHANGE UP (ref 3.5–5.3)
PROT SERPL-MCNC: 6.8 G/DL — SIGNIFICANT CHANGE UP (ref 6.6–8.7)
PROTHROM AB SERPL-ACNC: 10.9 SEC — SIGNIFICANT CHANGE UP (ref 9.5–13)
RBC # BLD: 4.51 M/UL — SIGNIFICANT CHANGE UP (ref 3.8–5.2)
RBC # FLD: 12.2 % — SIGNIFICANT CHANGE UP (ref 10.3–14.5)
SODIUM SERPL-SCNC: 135 MMOL/L — SIGNIFICANT CHANGE UP (ref 135–145)
WBC # BLD: 8.86 K/UL — SIGNIFICANT CHANGE UP (ref 3.8–10.5)
WBC # FLD AUTO: 8.86 K/UL — SIGNIFICANT CHANGE UP (ref 3.8–10.5)

## 2024-07-26 PROCEDURE — 74177 CT ABD & PELVIS W/CONTRAST: CPT | Mod: 26,MC

## 2024-07-26 PROCEDURE — 85730 THROMBOPLASTIN TIME PARTIAL: CPT

## 2024-07-26 PROCEDURE — 36415 COLL VENOUS BLD VENIPUNCTURE: CPT

## 2024-07-26 PROCEDURE — 80053 COMPREHEN METABOLIC PANEL: CPT

## 2024-07-26 PROCEDURE — 74177 CT ABD & PELVIS W/CONTRAST: CPT | Mod: MC

## 2024-07-26 PROCEDURE — 86305 HUMAN EPIDIDYMIS PROTEIN 4: CPT

## 2024-07-26 PROCEDURE — 74018 RADEX ABDOMEN 1 VIEW: CPT

## 2024-07-26 PROCEDURE — 96374 THER/PROPH/DIAG INJ IV PUSH: CPT | Mod: XU

## 2024-07-26 PROCEDURE — 99284 EMERGENCY DEPT VISIT MOD MDM: CPT | Mod: 25

## 2024-07-26 PROCEDURE — 86301 IMMUNOASSAY TUMOR CA 19-9: CPT

## 2024-07-26 PROCEDURE — 86304 IMMUNOASSAY TUMOR CA 125: CPT

## 2024-07-26 PROCEDURE — 82140 ASSAY OF AMMONIA: CPT

## 2024-07-26 PROCEDURE — 74018 RADEX ABDOMEN 1 VIEW: CPT | Mod: 26

## 2024-07-26 PROCEDURE — 99284 EMERGENCY DEPT VISIT MOD MDM: CPT

## 2024-07-26 PROCEDURE — 85025 COMPLETE CBC W/AUTO DIFF WBC: CPT

## 2024-07-26 PROCEDURE — 82378 CARCINOEMBRYONIC ANTIGEN: CPT

## 2024-07-26 PROCEDURE — 85610 PROTHROMBIN TIME: CPT

## 2024-07-26 RX ORDER — MIDAZOLAM HYDROCHLORIDE 1 MG/ML
2.5 INJECTION INTRAMUSCULAR; INTRAVENOUS ONCE
Refills: 0 | Status: COMPLETED | OUTPATIENT
Start: 2024-07-26 | End: 2024-07-26

## 2024-07-26 RX ORDER — MIDAZOLAM HYDROCHLORIDE 1 MG/ML
2.5 INJECTION INTRAMUSCULAR; INTRAVENOUS ONCE
Refills: 0 | Status: DISCONTINUED | OUTPATIENT
Start: 2024-07-26 | End: 2024-07-26

## 2024-07-26 RX ADMIN — MIDAZOLAM HYDROCHLORIDE 2.5 MILLIGRAM(S): 1 INJECTION INTRAMUSCULAR; INTRAVENOUS at 17:17

## 2024-07-26 NOTE — HEALTH RISK ASSESSMENT
[Yes] : Yes [4 or more  times a week (4 pts)] : 4 or more  times a week (4 points) [1 or 2 (0 pts)] : 1 or 2 (0 points) [Never (0 pts)] : Never (0 points) [No] : In the past 12 months have you used drugs other than those required for medical reasons? No [Current] : Current [20 or more] : 20 or more [de-identified] : Patient only drinks on the weekend total of approximately 5 drinks. [Audit-CScore] : 4

## 2024-07-26 NOTE — ED ADULT NURSE NOTE - HOW OFTEN DO YOU HAVE A DRINK CONTAINING ALCOHOL?
Never Humira Pregnancy And Lactation Text: This medication is Pregnancy Category B and is considered safe during pregnancy. It is unknown if this medication is excreted in breast milk.

## 2024-07-26 NOTE — ED PROVIDER NOTE - NSFOLLOWUPINSTRUCTIONS_ED_ALL_ED_FT
Reviewed work up results with patient. Comfortable with plan for discharge. Agrees to follow up with gynecologic oncology. Return instructions given, questions answered. You may take Tylenol extra strength 2 tablets every 6 hours or Ibuprofen 600mg (3 tablets) every 6 hours as needed for aches, pains. If symptoms worsen, please return to emergency department.

## 2024-07-26 NOTE — ED ADULT TRIAGE NOTE - CHIEF COMPLAINT QUOTE
pt c/o abdominal pain & distention, started a few weeks ago  A&Ox3, resp wnl, c/o pressure to abdomen, + distention, told to come to ED by MD

## 2024-07-26 NOTE — END OF VISIT
[] : Resident [FreeTextEntry3] : I, Dr. Steve Costa, saw and evaluated this patient in the presence of the resident. I discussed the management with the resident. I reviewed the note and agree with the documented plan of care with the following confirmations/corrections/additions:  Emphasized importance of seeking emergency attention. has not had BM in a month recently watery leakage but no formed stool as stomach expands. Pt understands and acknowledges this is a medical emergency

## 2024-07-26 NOTE — HISTORY OF PRESENT ILLNESS
[Severe] : severe [Constant] : constant [Constipation] : constipation [Worsening] : worsening [Home] : at home, [unfilled] , at the time of the visit. [Medical Office: ([facility])___] : at the medical office located in  [Verbal consent obtained from patient] : the patient, [unfilled] [Nausea] : no nausea [Vomiting] : no vomiting [FreeTextEntry1] : 1 month [FreeTextEntry8] : Patient is a 68 y/o F with Psoriasis, acid reflux/indigestion, presenting with abdominal distension and constipation x 1 month. Patient also states she feels a solid raised lesion on the L of her umbilicus. Her abdomen has been steadily increasing in size and feels rigid but is not painful. Patient also states she hasn't passed any flatus. Prior to the constipation, patient was making 1 BM/day. Patient tried self-medicating with Bisacodyl 5mg which caused her to make watery stools, containing mucus but no blood. Nothing has improved her abdominal distension. Patient denies any prior episodes of this. Patient states she only drinks on the weekend, approx. 5 drinks total, 2/day. Current smoker 50 pack-years. Family history of Crohn's disease, and diverticulitis. Denies CP, SOB, edema, or easy bruising/bleeding.   GI APPT- Sept 13th

## 2024-07-26 NOTE — ED ADULT NURSE NOTE - OBJECTIVE STATEMENT
Assumed care of pt at 1457. Pt A&Ox4 c/o D/abdominal pain/distention, pt denies CP/SOB, pt resting comfortably showing no signs of respiratory distress or pain, pt is calm and cooperative

## 2024-07-26 NOTE — ED STATDOCS - PROGRESS NOTE DETAILS
Pt with swelling to abdomen for at least the past two weeks   no trauma  used to drink 4-5 beers per day "my whole life" until recently. Abd distended. Trace pitting edema bilateral ankles. Advised she needs to be evaluated, that I am concerned about her liver and she may need a CT scan. Pt expresses severe concern over claustrophobia in machines. Advised that we can give her medication to help her tolerate the test if her doctor believes it to be necessary.

## 2024-07-26 NOTE — REVIEW OF SYSTEMS
[Nausea] : nausea [Constipation] : constipation [Vomiting] : vomiting [Fever] : no fever [Chills] : no chills [Fatigue] : no fatigue [Recent Change In Weight] : ~T no recent weight change [Chest Pain] : no chest pain [Lower Ext Edema] : no lower extremity edema [Shortness Of Breath] : no shortness of breath [Wheezing] : no wheezing [Abdominal Pain] : no abdominal pain [Muscle Weakness] : no muscle weakness [Muscle Pain] : no muscle pain [Easy Bleeding] : no easy bleeding [Easy Bruising] : no easy bruising [FreeTextEntry7] : Mild nausea/vomiting from the laxatives, watery stool d/t laxative use

## 2024-07-26 NOTE — ED PROVIDER NOTE - PHYSICAL EXAMINATION
· CONSTITUTIONAL: In no apparent distress.  · HEENMT: Airway patent, normal appearing mouth, nose, throat, neck supple with full range of motion, no cervical adenopathy.  · EYES: Pupils equal, round and reactive to light, Extra-ocular movement intact, eyes are clear b/l  · CARDIAC: Regular rate and rhythm, Heart sounds S1/S2 present, no murmurs, rubs or gallops  · RESPIRATORY: No respiratory distress. No stridor, Lungs sounds clear with good aeration bilaterally.  · GASTROINTESTINAL: Abdomen soft, +distended,  mild ttp in LLQ, no rebound, no guarding and no masses. no hepatosplenomegaly.  · MUSCULOSKELETAL: Spine appears normal, movement of extremities grossly intact.  · NEUROLOGICAL: Alert and interactive, no focal deficits  · SKIN: No cyanosis, no pallor, no jaundice, no rash  · PSYCHIATRIC: Alert and oriented to person, place and time. Normal mood and affect, no apparent risk to self or others

## 2024-07-26 NOTE — HISTORY OF PRESENT ILLNESS
[Severe] : severe [Constant] : constant [Constipation] : constipation [Worsening] : worsening [Home] : at home, [unfilled] , at the time of the visit. [Medical Office: ([facility])___] : at the medical office located in  [Verbal consent obtained from patient] : the patient, [unfilled] [Nausea] : no nausea [Vomiting] : no vomiting [FreeTextEntry1] : 1 month [FreeTextEntry8] : Patient is a 68 y/o F with Psoriasis, acid reflux/indigestion, presenting with abdominal distension and constipation x 1 month. Patient also states she feels a solid raised lesion on the L of her umbilicus. Her abdomen has been steadily increasing in size and feels rigid but is not painful. Patient also states she hasn't passed any flatus. Prior to the constipation, patient was making 1 BM/day. Patient tried self-medicating with Bisacodyl 5mg which caused her to make watery stools, containing mucus but no blood. Nothing has improved her abdominal distension. Patient denies any prior episodes of this. Patient states she only drinks on the weekend, approx. 5 drinks total, 2/day. Current smoker 50 pack-years. Family history of Crohn's disease, and diverticulitis. Denies CP, SOB, edema, or easy bruising/bleeding.   GI APPT- Sept 13th

## 2024-07-26 NOTE — CONSULT NOTE ADULT - ATTENDING COMMENTS
Agree with assessment and plan as above:  - Imaging and presentation certainly concerning for intra-abdominal/pelvic malignancy  - Pt's condition however is stable, vitals and labs otherwise unremarkable  - Patient can be discharged, will arrange for close follow up w GYN Oncology to initiate workup within the next week  - Will f/u tumor markers sent in ER    Discussed w Dr. Emelyn Jalloh MD  Advanced Pelvic Surgery Fellow

## 2024-07-26 NOTE — REVIEW OF SYSTEMS
[Nausea] : nausea [Constipation] : constipation [Vomiting] : vomiting [Fever] : no fever [Chills] : no chills [Fatigue] : no fatigue [Recent Change In Weight] : ~T no recent weight change [Chest Pain] : no chest pain [Lower Ext Edema] : no lower extremity edema [Wheezing] : no wheezing [Shortness Of Breath] : no shortness of breath [Abdominal Pain] : no abdominal pain [Muscle Weakness] : no muscle weakness [Muscle Pain] : no muscle pain [Easy Bleeding] : no easy bleeding [Easy Bruising] : no easy bruising [FreeTextEntry7] : Mild nausea/vomiting from the laxatives, watery stool d/t laxative use

## 2024-07-26 NOTE — CONSULT NOTE ADULT - SUBJECTIVE AND OBJECTIVE BOX
HPI:     69y   Last Menstrual Period in her 50s   presented to the ED c/o increasing abdominal distension and discomfort. Imaging concerning for gyn malignancy, gyn onc consulted for recommendations. Pt seen at bedside, reports she noticed abdominal distension that was increasing for the past month, as well as changes to her BM with increasing diarrhea. Reports being able to tolerate PO w/o n/v. Denies CP/SOB/lightheadedness/dizziness. Reports a FH of breast ca in her mother at 80y, does not think she was tested for BRCA. Also reports FH of pancreatic ca in her brother. She has not seen a physician in decades, has not followed with a gyn since she was 28y. Has had one mammogram, which was normal.    PMHX; denies  PSHX; denies  POBHX; NSVDx1  PGYNHX: denies  Allergies    No Known Allergies    MEDS: denies    FH: breast ca (mother), pancreatic ca (brother), psoriatic arthritis      Vital Signs Last 24 Hrs  T(C): 37 (2024 22:22), Max: 37 (2024 22:22)  T(F): 98.6 (2024 22:22), Max: 98.6 (2024 22:22)  HR: 110 (2024 22:22) (110 - 115)  BP: 126/72 (2024 22:22) (120/68 - 150/86)  RR: 18 (2024 22:22) (18 - 18)  SpO2: 94% (2024 22:22) (94% - 99%)    Parameters below as of 2024 22:22  Patient On (Oxygen Delivery Method): room air    PHYSICAL EXAM:  Gen: well-appearing, NAD  Neuro: A&Ox3  Resp: breathing comfortably on RA  Abdomen: nontender, distended no palpable masses     LABS:                    14.1   8.86  )-----------( 547      ( 2024 13:50 )             42.5     07-    135  |  94<L>  |  6.7<L>  ----------------------------<  90  4.9   |  28.0  |  0.47<L>    Ca    9.6      2024 13:50    TPro  6.8  /  Alb  3.3  /  TBili  0.5  /  DBili  x   /  AST  28  /  ALT  11  /  AlkPhos  88      Urinalysis Basic - ( 2024 13:50 )    Color: x / Appearance: x / SG: x / pH: x  Gluc: 90 mg/dL / Ketone: x  / Bili: x / Urobili: x   Blood: x / Protein: x / Nitrite: x   Leuk Esterase: x / RBC: x / WBC x   Sq Epi: x / Non Sq Epi: x / Bacteria: x    RADIOLOGY STUDIES:  < from: CT Abdomen and Pelvis w/ IV Cont (24 @ 17:54) >  IMPRESSION:  Bilateral adnexal masses with moderate ascites and extensive peritoneal   carcinomatosis. Findings are concerning for malignancy of gynecologic   origin.    No evidence of bowel obstruction.        --- End of Report ---    < end of copied text >

## 2024-07-26 NOTE — CONSULT NOTE ADULT - ASSESSMENT
69y evaluated for imaging suspicious for gyn malignancy.    - no acute abdomen on exam, tolerating PO, minimal discomfort  - vital signs and labs unremarkable  - imaging concerning for gyn malignancy  - tumor markers ordered, will f/u  - pt to be scheduled for outpatient gyn onc apt to discuss future management     discussed with Dr. Jalloh, pelvic fellow 69y evaluated for imaging suspicious for gyn malignancy.    - no acute abdomen on exam, tolerating PO, minimal discomfort  - vital signs and labs unremarkable  - imaging concerning for gyn malignancy  - tumor markers ordered, will f/u  - pt to be scheduled for outpatient gyn onc apt to discuss future management   - clear for dc from gyn onc perspective, torsion/obstruction return precautions reviewed    discussed with Dr. Jalloh, pelvic fellow

## 2024-07-26 NOTE — ED PROVIDER NOTE - OBJECTIVE STATEMENT
68 y/o F with a PMHx of asthma presents with abdominal pain of 2 weeks duration. 68 y/o F with a PMHx of asthma presents with abdominal pain of 2 weeks duration Per pt, has noticed left side abdomen pressure for the last two weeks, and had a virtual call with an MD where patient was told symptoms concerning for obstruction. States has had associated diarrhea and mentions relief of diarrhea today. States tried a mild laxative for relief with no change. Reports has been able to keep  food down, Reports use of alcoholic daily, with 2 to 3 beers per day. Denies nausea, vomiting, chest pain, palpitations, vision problems, headache, dysuria, urinary frequency, constipation.

## 2024-07-26 NOTE — ED PROVIDER NOTE - CLINICAL SUMMARY MEDICAL DECISION MAKING FREE TEXT BOX
68 y/o F with a PMHx of asthma presents with abdominal pain of 2 weeks duration with associated diarrhea, extensive alcohol history. Denies nausea, vomiting. PE: NAD, s1/s2, CTAB. Distended, +TTP LLQ, BSx4, FROMx4, A&Ox3. Diverculitis vs obstruction vs liver pathology, Will obtain CBC, CMP, Coags, Xray abd, CT abd/pelvis with IV contrast. Pt states would not like to pursue CT given anxiety, offered anti-anxiety meds and declined. Will try to convince to pursue CT. To follow up. 70 y/o F with a PMHx of asthma presents with abdominal distention of 2 weeks duration with associated diarrhea, extensive alcohol history. Denies nausea, vomiting. PE: NAD, s1/s2, CTAB. Distended, +TTP LLQ, BSx4, FROMx4, A&Ox3. Diverculitis vs obstruction vs liver pathology, Will obtain CBC, CMP, Coags, Xray abd, CT abd/pelvis with IV contrast. Pt states would not like to pursue CT given anxiety, offered anti-anxiety meds and declined. Will try to convince to pursue CT. To follow up.

## 2024-07-26 NOTE — HEALTH RISK ASSESSMENT
[Yes] : Yes [4 or more  times a week (4 pts)] : 4 or more  times a week (4 points) [1 or 2 (0 pts)] : 1 or 2 (0 points) [Never (0 pts)] : Never (0 points) [No] : In the past 12 months have you used drugs other than those required for medical reasons? No [Current] : Current [20 or more] : 20 or more [Audit-CScore] : 4 [de-identified] : Patient only drinks on the weekend total of approximately 5 drinks.

## 2024-07-26 NOTE — ASSESSMENT
[FreeTextEntry1] : Patient is a 68 y/o F with Psoriasis, acid reflux/indigestion, presenting with abdominal distension and constipation x 1 month. Patient also states she feels a solid raised lesion on the L of her umbilicus.   #Fecal Impaction - Patient describing increasing abdominal distension and constipation x1 month, now presenting with mucus-watery stools since laxative use.  - No significant PMHx, or other symptoms - Rigid abdomen, no longer producing flatus - Patient urged to go to the ER for possible disimpaction

## 2024-07-26 NOTE — ED ADULT NURSE REASSESSMENT NOTE - NEURO ASSESSMENT
Subjective   Ben Abebe is a 66 y.o. male.   Chief Complaint   Patient presents with   • Follow-up     Pain in upper rt quad     Recent URI with associated RUQ pain. Was exposed to Covid but tests have been negative. Ultrasound showed distended gallbladder but no stones. LFT and bilirubin were elevated but are improving. Pain is also improving although not gone. Is still having diarrhea, CT showed a lot of stool in the colon. Has had 2 prior partial large bowel resections.  Abdominal Pain  This is a new problem. The current episode started 1 to 4 weeks ago. The onset quality is sudden. The problem has been gradually improving. The pain is located in the RUQ. The pain is at a severity of 4/10. The pain is moderate. The quality of the pain is dull and colicky. The abdominal pain does not radiate. Associated symptoms include diarrhea and a fever. The pain is aggravated by eating. The pain is relieved by nothing. Prior diagnostic workup includes CT scan and ultrasound.      The following portions of the patient's history were reviewed and updated as appropriate: allergies, current medications, past social history and problem list.    Outpatient Medications Prior to Visit   Medication Sig Dispense Refill   • Acetaminophen (TYLENOL EXTRA STRENGTH PO) Take 1 tablet by mouth As Needed.     • amLODIPine (NORVASC) 10 MG tablet Take 1 tablet by mouth Daily. 90 tablet 3   • aspirin 81 MG EC tablet Take  by mouth.     • clopidogrel (PLAVIX) 75 MG tablet Take 1 tablet by mouth Daily. 30 tablet 3   • cyclobenzaprine (FLEXERIL) 10 MG tablet Take 1 tablet by mouth 3 (Three) Times a Day As Needed for Muscle Spasms. 30 tablet 1   • dicyclomine (BENTYL) 20 MG tablet Take 1 tablet by mouth 4 (Four) Times a Day As Needed (bowel problem). TAKE 1 q 6-8 HOURS AS NEEDED FOR BOWEL PROBLEM 60 tablet 1   • EPINEPHrine (EPIPEN) 0.3 MG/0.3ML solution auto-injector injection Use as directed 1 each 2   • fenofibrate (TRICOR) 145 MG tablet  "Take 1 tablet by mouth Every Night. Takes in the evening 90 tablet 3   • fexofenadine (ALLEGRA) 180 MG tablet Take 180 mg by mouth Daily.     • fluticasone (FLONASE) 50 MCG/ACT nasal spray 2 sprays into the nostril(s) as directed by provider Daily. 1 bottle 3   • folic acid (FOLVITE) 1 MG tablet Take 1 mg by mouth Daily. Takes in evening     • irbesartan-hydrochlorothiazide (AVALIDE) 300-12.5 MG tablet Take 1 tablet by mouth Daily. 90 tablet 3   • ketorolac (TORADOL) 10 MG tablet Take 1 tablet by mouth Every 6 (Six) Hours As Needed for Moderate Pain . 12 tablet 0   • magnesium gluconate (MAGONATE) 500 MG tablet Take 500 mg by mouth Daily. Takes in evening     • Multiple Vitamins-Minerals (CENTRUM SILVER ULTRA MENS PO) Take 1 tablet by mouth Daily.     • ondansetron ODT (ZOFRAN-ODT) 4 MG disintegrating tablet Place 1 tablet on the tongue 4 (Four) Times a Day As Needed for Nausea or Vomiting. 12 tablet 0   • polyethylene glycol (MIRALAX) 17 GM/SCOOP powder Take 17 g by mouth Daily. 116 g 0   • pravastatin (PRAVACHOL) 20 MG tablet      • venlafaxine XR (EFFEXOR-XR) 150 MG 24 hr capsule Take 150 mg by mouth Daily.     • vitamin B-12 (CYANOCOBALAMIN) 2500 MCG sublingual tablet tablet Place 5,000 mcg under the tongue Daily.     • metoprolol succinate XL (Toprol XL) 50 MG 24 hr tablet Take 1 tablet by mouth Daily. 90 tablet 3   • traZODone (DESYREL) 50 MG tablet TAKE 1 TO 2 TABLETS BY MOUTH EVERY DAY AT BEDTIME AS NEEDED 180 tablet 1     No facility-administered medications prior to visit.       Review of Systems   Constitutional: Positive for fever.   Gastrointestinal: Positive for abdominal pain and diarrhea.     I have reviewed 12 systems with patient. Findings were negative except what is noted below and/or in history of present illness.     Objective   Visit Vitals  /70   Pulse 76   Temp 99.3 °F (37.4 °C) (Tympanic)   Ht 177.8 cm (70\")   Wt 85.5 kg (188 lb 8 oz)   SpO2 96%   BMI 27.05 kg/m²     Physical " Exam  Vitals and nursing note reviewed.   Constitutional:       General: He is not in acute distress.     Appearance: He is well-developed.   HENT:      Head: Normocephalic and atraumatic.      Nose: Nose normal.   Eyes:      General:         Right eye: No discharge.         Left eye: No discharge.      Conjunctiva/sclera: Conjunctivae normal.      Pupils: Pupils are equal, round, and reactive to light.   Neck:      Thyroid: No thyromegaly.   Cardiovascular:      Rate and Rhythm: Normal rate and regular rhythm.      Heart sounds: Normal heart sounds. No murmur heard.  Pulmonary:      Effort: Pulmonary effort is normal. No respiratory distress.      Breath sounds: Normal breath sounds. No wheezing or rales.   Chest:      Chest wall: No tenderness.   Abdominal:      General: Bowel sounds are normal. There is no distension.      Palpations: Abdomen is soft. There is no mass.      Tenderness: There is abdominal tenderness in the right upper quadrant. There is guarding. There is no rebound.      Hernia: No hernia is present.   Musculoskeletal:         General: No deformity. Normal range of motion.      Cervical back: Normal range of motion.   Lymphadenopathy:      Cervical: No cervical adenopathy.   Skin:     General: Skin is warm and dry.      Coloration: Skin is not pale.      Findings: No rash.   Neurological:      Mental Status: He is alert and oriented to person, place, and time.      Deep Tendon Reflexes: Reflexes are normal and symmetric.   Psychiatric:         Behavior: Behavior normal.         Thought Content: Thought content normal.         Judgment: Judgment normal.       Results for orders placed or performed in visit on 06/27/22   Comprehensive Metabolic Panel    Specimen: Blood   Result Value Ref Range    Glucose 126 (H) 65 - 99 mg/dL    BUN 22 8 - 23 mg/dL    Creatinine 1.24 0.76 - 1.27 mg/dL    Sodium 137 136 - 145 mmol/L    Potassium 4.0 3.5 - 5.2 mmol/L    Chloride 99 98 - 107 mmol/L    CO2 24.0 22.0 -  29.0 mmol/L    Calcium 9.9 8.6 - 10.5 mg/dL    Total Protein 7.7 6.0 - 8.5 g/dL    Albumin 4.10 3.50 - 5.20 g/dL    ALT (SGPT) 74 (H) 1 - 41 U/L    AST (SGOT) 33 1 - 40 U/L    Alkaline Phosphatase 149 (H) 39 - 117 U/L    Total Bilirubin 1.1 0.0 - 1.2 mg/dL    Globulin 3.6 gm/dL    A/G Ratio 1.1 g/dL    BUN/Creatinine Ratio 17.7 7.0 - 25.0    Anion Gap 14.0 5.0 - 15.0 mmol/L    eGFR 63.3 >60.0 mL/min/1.73      Notes brought forward are reviewed and updated if indicated.     Assessment & Plan   Problems Addressed this Visit        Cardiac and Vasculature    Essential hypertension (Chronic)    Relevant Medications    metoprolol succinate XL (Toprol XL) 50 MG 24 hr tablet       Gastrointestinal Abdominal     History of resection of large bowel    Relevant Orders    XR Abdomen KUB       Sleep    Insomnia (Chronic)    Relevant Medications    traZODone (DESYREL) 50 MG tablet      Other Visit Diagnoses     RUQ pain    -  Primary    Diarrhea, unspecified type        Relevant Orders    XR Abdomen KUB    Elevated liver enzymes        Fever in other diseases          Diagnoses       Codes Comments    RUQ pain    -  Primary ICD-10-CM: R10.11  ICD-9-CM: 789.01     Diarrhea, unspecified type     ICD-10-CM: R19.7  ICD-9-CM: 787.91     History of resection of large bowel     ICD-10-CM: Z90.49  ICD-9-CM: V15.29     Elevated liver enzymes     ICD-10-CM: R74.8  ICD-9-CM: 790.5     Fever in other diseases     ICD-10-CM: R50.81  ICD-9-CM: 780.61     Other insomnia     ICD-10-CM: G47.09  ICD-9-CM: 780.52     Essential hypertension     ICD-10-CM: I10  ICD-9-CM: 401.9           Will notify regarding results. See  as planned.    New Medications Ordered This Visit   Medications   • traZODone (DESYREL) 50 MG tablet     Sig: TAKE 1 TO 2 TABLETS BY MOUTH EVERY DAY AT BEDTIME AS NEEDED     Dispense:  180 tablet     Refill:  0   • metoprolol succinate XL (Toprol XL) 50 MG 24 hr tablet     Sig: Take 1 tablet by mouth Daily.     Dispense:   90 tablet     Refill:  0     Return if symptoms worsen or fail to improve, for Next scheduled follow up.        This document has been electronically signed by Melvi Hernandez MD on June 27, 2022 10:45 CDT     - - -

## 2024-07-26 NOTE — ED PROVIDER NOTE - CARE PROVIDER_API CALL
Laney Dunaway  Gynecologic Oncology  [address] [place] [address]  Phone: [phone]  Fax: [phone]  Follow Up Time: 7-10 Days

## 2024-07-26 NOTE — ED PROVIDER NOTE - PATIENT PORTAL LINK FT
You can access the FollowMyHealth Patient Portal offered by [facility] by registering at the following website: [email or web address]. By joining [employer]’s FollowMyHealth portal, you will also be able to view your health information using other applications (apps) compatible with our system.

## 2024-07-26 NOTE — ED ADULT NURSE REASSESSMENT NOTE - NS ED NURSE REASSESS COMMENT FT1
report received from MASOOD Bishop, pt. axo3 awaiting further orders from MD. pt. showing no signs of distress
per MD Nye, ok to DC patient with HR of 110. pt. denies SOB, dizziness, chest pressure.
pt resting comfortably showing no signs of respiratory distress or pain, pt is calm and cooperative

## 2024-07-26 NOTE — ED PROVIDER NOTE - ATTENDING CONTRIBUTION TO CARE
70 y/o F with a PMHx of asthma presents with abdominal pain of 2 weeks duration + diarrhea pe awakealert heent ncat neck supple cor s1s2 lung cta abd ttp over luq and llq  neuro moves all ext; dx abd pain  eval diverticultis vs obstruction
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
## 2024-07-26 NOTE — ED PROVIDER NOTE - PROGRESS NOTE DETAILS
Eddy: 2.5 mg versed given for CT. CT showed bilateral adnexal masses concerned for malignancy. GynOnc consulted. Eddy: 2.5 mg versed given for CT. CT showed bilateral adnexal masses with moderate ascites and peritoneal carcinomatosis concerning for malignancy of gyncological origin. Gyn-Onc consulted. Eddy: 2.5 mg versed given for CT. CT showed bilateral adnexal masses with moderate ascites and peritoneal carcinomatosis concerning for malignancy of gyncological origin. Gyn-Onc consulted. Plan pending out-pt follow up v. admission.

## 2024-07-27 LAB
CANCER AG125 SERPL-ACNC: 2099 U/ML — HIGH
CANCER AG19-9 SERPL-ACNC: 18 U/ML — SIGNIFICANT CHANGE UP
CEA SERPL-MCNC: 1.9 NG/ML — SIGNIFICANT CHANGE UP (ref 0–3.8)

## 2024-07-29 LAB — HE 4: 1345 PMOL/L — HIGH (ref 0–96.9)

## 2024-07-31 ENCOUNTER — APPOINTMENT (OUTPATIENT)
Dept: GYNECOLOGIC ONCOLOGY | Facility: CLINIC | Age: 69
End: 2024-07-31
Payer: MEDICARE

## 2024-07-31 ENCOUNTER — NON-APPOINTMENT (OUTPATIENT)
Age: 69
End: 2024-07-31

## 2024-07-31 VITALS
DIASTOLIC BLOOD PRESSURE: 76 MMHG | HEIGHT: 64 IN | SYSTOLIC BLOOD PRESSURE: 136 MMHG | RESPIRATION RATE: 16 BRPM | BODY MASS INDEX: 20.32 KG/M2 | WEIGHT: 119 LBS | OXYGEN SATURATION: 94 % | HEART RATE: 114 BPM

## 2024-07-31 DIAGNOSIS — Z80.8 FAMILY HISTORY OF MALIGNANT NEOPLASM OF OTHER ORGANS OR SYSTEMS: ICD-10-CM

## 2024-07-31 DIAGNOSIS — Z78.9 OTHER SPECIFIED HEALTH STATUS: ICD-10-CM

## 2024-07-31 DIAGNOSIS — R18.8 OTHER ASCITES: ICD-10-CM

## 2024-07-31 DIAGNOSIS — Z80.0 FAMILY HISTORY OF MALIGNANT NEOPLASM OF DIGESTIVE ORGANS: ICD-10-CM

## 2024-07-31 DIAGNOSIS — F17.200 NICOTINE DEPENDENCE, UNSPECIFIED, UNCOMPLICATED: ICD-10-CM

## 2024-07-31 DIAGNOSIS — R19.00 INTRA-ABDOMINAL AND PELVIC SWELLING, MASS AND LUMP, UNSPECIFIED SITE: ICD-10-CM

## 2024-07-31 DIAGNOSIS — R14.0 ABDOMINAL DISTENSION (GASEOUS): ICD-10-CM

## 2024-07-31 DIAGNOSIS — Z63.4 DISAPPEARANCE AND DEATH OF FAMILY MEMBER: ICD-10-CM

## 2024-07-31 DIAGNOSIS — Z80.3 FAMILY HISTORY OF MALIGNANT NEOPLASM OF BREAST: ICD-10-CM

## 2024-07-31 DIAGNOSIS — K66.8 OTHER SPECIFIED DISORDERS OF PERITONEUM: ICD-10-CM

## 2024-07-31 PROBLEM — N94.89 ADNEXAL MASS: Status: ACTIVE | Noted: 2024-07-31

## 2024-07-31 PROCEDURE — 99459 PELVIC EXAMINATION: CPT

## 2024-07-31 PROCEDURE — 99204 OFFICE O/P NEW MOD 45 MIN: CPT

## 2024-07-31 PROCEDURE — G2211 COMPLEX E/M VISIT ADD ON: CPT

## 2024-07-31 SDOH — SOCIAL STABILITY - SOCIAL INSECURITY: DISSAPEARANCE AND DEATH OF FAMILY MEMBER: Z63.4

## 2024-08-01 ENCOUNTER — NON-APPOINTMENT (OUTPATIENT)
Age: 69
End: 2024-08-01

## 2024-08-01 PROBLEM — R19.00 ABDOMINAL MASS: Status: ACTIVE | Noted: 2024-08-01

## 2024-08-02 NOTE — END OF VISIT
[FreeTextEntry3] : I, Dr. Frantz Martinez, personally performed the evaluation and management (E/M) services for this new patient. That E/M includes conducting the initial examination, assessing all conditions, and establishing the plan of care. Today, Cori Patton PA-C, was here to observe my evaluation and management services for this patient to be followed going forward.

## 2024-08-02 NOTE — CHIEF COMPLAINT
[Friend] : friend [FreeTextEntry1] : [facility] [address] [place] [id number] [phone]  Bilateral adnexal masses; peritoneal carcinomatosis

## 2024-08-02 NOTE — PHYSICAL EXAM
[Chaperone Present] : A chaperone was present in the examining room during all aspects of the physical examination [18500] : A chaperone was present during the pelvic exam. [Normal] : Parametria: Normal [Abnormal] : Bimanual Exam: Abnormal [Fully active, able to carry on all pre-disease performance without restriction] : Status 0 - Fully active, able to carry on all pre-disease performance without restriction [FreeTextEntry2] : Cori Patton PA-C. [de-identified] : small uterus, fluid filled pelvis

## 2024-08-02 NOTE — PLAN
student
[TextEntry] : IR biopsy @ [facility] Referral to Dr. Jennings at [facility] Follow up after 3 treatment cycles to discuss possible surgery
[TextEntry] : IR biopsy @ [facility] Referral to Dr. Jennings at [facility] Follow up after 3 treatment cycles to discuss possible surgery

## 2024-08-02 NOTE — PHYSICAL EXAM
[Chaperone Present] : A chaperone was present in the examining room during all aspects of the physical examination [20137] : A chaperone was present during the pelvic exam. [Normal] : Parametria: Normal [Abnormal] : Bimanual Exam: Abnormal [Fully active, able to carry on all pre-disease performance without restriction] : Status 0 - Fully active, able to carry on all pre-disease performance without restriction [FreeTextEntry2] : Cori Patton PA-C. [de-identified] : small uterus, fluid filled pelvis

## 2024-08-02 NOTE — ASSESSMENT
[FreeTextEntry1] : 68 yo female with worsening abdominal distension and discomfort, with bilateral adnexal masses, peritoneal carcinomatosis, and moderate ascites seen on recent CT imaging.  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 We discussed at length management options including surgery and chemotherapy, as well as the differences between the order I would recommend these. Given the extent of disease on her scans, I recommend an IR biopsy first to obtain tissue for diagnosis, followed by chemotherapy. I believe there will be too many risks associated with primary surgical management. We discussed the possibility of surgical intervention after 3 cycles of chemotherapy with debulking and HIPEC. My team will assist in scheduling both the IR biopsy and appointment with medical oncology. She expressed her understanding of the information discussed today. All questions and concerns were answered to patient's apparent satisfaction.

## 2024-08-02 NOTE — CHIEF COMPLAINT
[Friend] : friend [FreeTextEntry1] : [facility] [address] [place] [id number] [phone]  Bilateral adnexal masses; peritoneal carcinomatosis

## 2024-08-02 NOTE — HISTORY OF PRESENT ILLNESS
[FreeTextEntry1] : 68 yo  LMP in her 50s referred by [facility] ED, presents to office for evaluation of bilateral adnexal masses and peritoneal carcinomatosis. Patient presented to ED on 24 with complaint of increasing abdominal distension and discomfort over the last month. She also endorses changes to her bowel movements, noting more constipation. Her last adequate bowel movement was 24. She has been tolerating PO without nausea or vomiting. She admits she does not have a PCP and is not up to date with her GYN care. Denies unintentional weight loss, bleeding, change in bladder habits, hematochezia.   CT A/P 2024: Bilateral adnexal masses measuring 3.0 x 2.0 cm on the right and 5.7 x 1.0 cm on the left. Uterus is unremarkable. Moderate ascites. Extensive peritoneal carcinomatosis. Upper abdominal mass 7.1 x 4.8 cm. Right mid abdominal mass 5.3 x 4.1 cm. Lower abdominal mass 6.2 x 5.3 cm. Wall thickening of the sigmoid colon, which may reflect the presence of serosal implants. Nodularity in the cul-de-sac and along the paracolic gutters, as well as along the bladder dome. No lymphadenopathy.   Blood work 2024: : 2099 CA 19-9: 18 CEA: 1.9 HE4: 1345.0  Last pap: Many years ago Last mammo: Many years ago Last colo: Never Last DEXA: Never

## 2024-08-02 NOTE — HISTORY OF PRESENT ILLNESS
[FreeTextEntry1] : 68 yo  LMP in her 50s referred by [facility] ED, presents to office for evaluation of bilateral adnexal masses and peritoneal carcinomatosis. Patient presented to ED on 24 with complaint of increasing abdominal distension and discomfort over the last month. She also endorses changes to her bowel movements, noting more constipation. Her last adequate bowel movement was 24. She has been tolerating PO without nausea or vomiting. She admits she does not have a PCP and is not up to date with her GYN care. Denies unintentional weight loss, bleeding, change in bladder habits, hematochezia.   CT A/P 2024: Bilateral adnexal masses measuring 3.0 x 2.0 cm on the right and 5.7 x 1.0 cm on the left. Uterus is unremarkable. Moderate ascites. Extensive peritoneal carcinomatosis. Upper abdominal mass 7.1 x 4.8 cm. Right mid abdominal mass 5.3 x 4.1 cm. Lower abdominal mass 6.2 x 5.3 cm. Wall thickening of the sigmoid colon, which may reflect the presence of serosal implants. Nodularity in the cul-de-sac and along the paracolic gutters, as well as along the bladder dome. No lymphadenopathy.   Blood work 2024: : 2099 CA 19-9: 18 CEA: 1.9 HE4: 1345.0  Last pap: Many years ago Last mammo: Many years ago Last colo: Never Last DEXA: Never

## 2024-08-05 ENCOUNTER — OUTPATIENT (OUTPATIENT)
Dept: OUTPATIENT SERVICES | Facility: HOSPITAL | Age: 69
LOS: 1 days | End: 2024-08-05
Payer: COMMERCIAL

## 2024-08-05 ENCOUNTER — APPOINTMENT (OUTPATIENT)
Dept: INTERNAL MEDICINE | Facility: CLINIC | Age: 69
End: 2024-08-05

## 2024-08-05 VITALS
RESPIRATION RATE: 20 BRPM | OXYGEN SATURATION: 97 % | DIASTOLIC BLOOD PRESSURE: 60 MMHG | HEART RATE: 97 BPM | HEIGHT: 63 IN | WEIGHT: 117.73 LBS | SYSTOLIC BLOOD PRESSURE: 120 MMHG | TEMPERATURE: 98 F

## 2024-08-05 DIAGNOSIS — N94.89 OTHER SPECIFIED CONDITIONS ASSOCIATED WITH FEMALE GENITAL ORGANS AND MENSTRUAL CYCLE: ICD-10-CM

## 2024-08-05 DIAGNOSIS — Z29.9 ENCOUNTER FOR PROPHYLACTIC MEASURES, UNSPECIFIED: ICD-10-CM

## 2024-08-05 DIAGNOSIS — Z01.818 ENCOUNTER FOR OTHER PREPROCEDURAL EXAMINATION: ICD-10-CM

## 2024-08-05 DIAGNOSIS — R94.31 ABNORMAL ELECTROCARDIOGRAM [ECG] [EKG]: ICD-10-CM

## 2024-08-05 LAB
A1C WITH ESTIMATED AVERAGE GLUCOSE RESULT: 5.3 % — SIGNIFICANT CHANGE UP (ref 4–5.6)
ALBUMIN SERPL ELPH-MCNC: 3.2 G/DL — LOW (ref 3.3–5.2)
ALP SERPL-CCNC: 78 U/L — SIGNIFICANT CHANGE UP (ref 40–120)
ALT FLD-CCNC: 10 U/L — SIGNIFICANT CHANGE UP
ANION GAP SERPL CALC-SCNC: 14 MMOL/L — SIGNIFICANT CHANGE UP (ref 5–17)
APTT BLD: 28.2 SEC — SIGNIFICANT CHANGE UP (ref 24.5–35.6)
AST SERPL-CCNC: 19 U/L — SIGNIFICANT CHANGE UP
BASOPHILS # BLD AUTO: 0.05 K/UL — SIGNIFICANT CHANGE UP (ref 0–0.2)
BASOPHILS NFR BLD AUTO: 0.5 % — SIGNIFICANT CHANGE UP (ref 0–2)
BILIRUB SERPL-MCNC: 0.3 MG/DL — LOW (ref 0.4–2)
BUN SERPL-MCNC: 11.2 MG/DL — SIGNIFICANT CHANGE UP (ref 8–20)
CALCIUM SERPL-MCNC: 8.9 MG/DL — SIGNIFICANT CHANGE UP (ref 8.4–10.5)
CHLORIDE SERPL-SCNC: 95 MMOL/L — LOW (ref 96–108)
CO2 SERPL-SCNC: 26 MMOL/L — SIGNIFICANT CHANGE UP (ref 22–29)
CREAT SERPL-MCNC: 0.47 MG/DL — LOW (ref 0.5–1.3)
EGFR: 102 ML/MIN/1.73M2 — SIGNIFICANT CHANGE UP
EOSINOPHIL # BLD AUTO: 0.21 K/UL — SIGNIFICANT CHANGE UP (ref 0–0.5)
EOSINOPHIL NFR BLD AUTO: 2.2 % — SIGNIFICANT CHANGE UP (ref 0–6)
ESTIMATED AVERAGE GLUCOSE: 105 MG/DL — SIGNIFICANT CHANGE UP (ref 68–114)
GLUCOSE SERPL-MCNC: 91 MG/DL — SIGNIFICANT CHANGE UP (ref 70–99)
HCT VFR BLD CALC: 35.6 % — SIGNIFICANT CHANGE UP (ref 34.5–45)
HGB BLD-MCNC: 11.7 G/DL — SIGNIFICANT CHANGE UP (ref 11.5–15.5)
IMM GRANULOCYTES NFR BLD AUTO: 0.6 % — SIGNIFICANT CHANGE UP (ref 0–0.9)
INR BLD: 1.04 RATIO — SIGNIFICANT CHANGE UP (ref 0.85–1.18)
LYMPHOCYTES # BLD AUTO: 1.04 K/UL — SIGNIFICANT CHANGE UP (ref 1–3.3)
LYMPHOCYTES # BLD AUTO: 10.8 % — LOW (ref 13–44)
MCHC RBC-ENTMCNC: 30.2 PG — SIGNIFICANT CHANGE UP (ref 27–34)
MCHC RBC-ENTMCNC: 32.9 GM/DL — SIGNIFICANT CHANGE UP (ref 32–36)
MCV RBC AUTO: 92 FL — SIGNIFICANT CHANGE UP (ref 80–100)
MONOCYTES # BLD AUTO: 0.95 K/UL — HIGH (ref 0–0.9)
MONOCYTES NFR BLD AUTO: 9.9 % — SIGNIFICANT CHANGE UP (ref 2–14)
NEUTROPHILS # BLD AUTO: 7.31 K/UL — SIGNIFICANT CHANGE UP (ref 1.8–7.4)
NEUTROPHILS NFR BLD AUTO: 76 % — SIGNIFICANT CHANGE UP (ref 43–77)
PLATELET # BLD AUTO: 649 K/UL — HIGH (ref 150–400)
POTASSIUM SERPL-MCNC: 4.7 MMOL/L — SIGNIFICANT CHANGE UP (ref 3.5–5.3)
POTASSIUM SERPL-SCNC: 4.7 MMOL/L — SIGNIFICANT CHANGE UP (ref 3.5–5.3)
PROT SERPL-MCNC: 6.4 G/DL — LOW (ref 6.6–8.7)
PROTHROM AB SERPL-ACNC: 11.5 SEC — SIGNIFICANT CHANGE UP (ref 9.5–13)
RBC # BLD: 3.87 M/UL — SIGNIFICANT CHANGE UP (ref 3.8–5.2)
RBC # FLD: 12.2 % — SIGNIFICANT CHANGE UP (ref 10.3–14.5)
SODIUM SERPL-SCNC: 135 MMOL/L — SIGNIFICANT CHANGE UP (ref 135–145)
WBC # BLD: 9.62 K/UL — SIGNIFICANT CHANGE UP (ref 3.8–10.5)
WBC # FLD AUTO: 9.62 K/UL — SIGNIFICANT CHANGE UP (ref 3.8–10.5)

## 2024-08-05 PROCEDURE — 85025 COMPLETE CBC W/AUTO DIFF WBC: CPT

## 2024-08-05 PROCEDURE — G2211 COMPLEX E/M VISIT ADD ON: CPT | Mod: GC

## 2024-08-05 PROCEDURE — 93010 ELECTROCARDIOGRAM REPORT: CPT

## 2024-08-05 PROCEDURE — G0463: CPT

## 2024-08-05 PROCEDURE — 99214 OFFICE O/P EST MOD 30 MIN: CPT | Mod: GC

## 2024-08-05 PROCEDURE — 83036 HEMOGLOBIN GLYCOSYLATED A1C: CPT

## 2024-08-05 PROCEDURE — 85610 PROTHROMBIN TIME: CPT

## 2024-08-05 PROCEDURE — 80053 COMPREHEN METABOLIC PANEL: CPT

## 2024-08-05 PROCEDURE — 93005 ELECTROCARDIOGRAM TRACING: CPT

## 2024-08-05 PROCEDURE — 85730 THROMBOPLASTIN TIME PARTIAL: CPT

## 2024-08-05 PROCEDURE — 36415 COLL VENOUS BLD VENIPUNCTURE: CPT

## 2024-08-05 NOTE — H&P PST ADULT - NSICDXFAMILYHX_GEN_ALL_CORE_FT
FAMILY HISTORY:  Mother  Still living? No  FHx: breast cancer, Age at diagnosis: Age Unknown    Sibling  Still living? Yes, Estimated age: Age Unknown  FH: pancreatic cancer, Age at diagnosis: Age Unknown  FHx: lung cancer, Age at diagnosis: Age Unknown

## 2024-08-05 NOTE — H&P PST ADULT - ASSESSMENT
Pt is a 69 years old female seen today pre-op for CT US Guided abdominal retroperitoneal mass biopsy with anesthesia. Pt reports she recently presented to [facility] ED for increased abdominal distension, discomfort,  imaging concerning for gyn malignancy. Pt  reports constipation, poor PO intakes due to abdominal pain and GUZMÁN. Pt denies chest pain, syncope, heart palpitation, fever/chills and any other related issues. Pt scheduled for this radiology procedure on 8/9/24  with dr. Richard   Pt is a 69 years old female seen today pre-op for CT Guided abdominal retroperitoneal mass biopsy with anesthesia.  Pt scheduled for this radiology procedure on 24  with dr. Richard. Surgery protocol reviewed with Pt today. Pt to follow up with PCP, cardiologist for evaluation and clearance prior to this surgery  Patient instructed on NPO protocol   Patient instructed on Liquid protocol   Patient instructed to stop NSAID, all vitamins supplement, Fish oil, COQ 10,  herbals 5 days before this surgery.   Pt okay to take Tylenol as needed for pain   Patient will continue to take all his medications as prescribed    Patient instructed on infection prevention   Chlorhexidine scrub instructions provided  CAPRINI VTE 2.0 SCORE [CLOT updated 2019]    AGE RELATED RISK FACTORS                                                       MOBILITY RELATED FACTORS  [ ] Age 41-60 years                                            (1 Point)                    [ ] Bed rest                                                        (1 Point)  [X ] Age: 61-74 years                                           (2 Points)                  [ ] Plaster cast                                                   (2 Points)  [ ] Age= 75 years                                              (3 Points)                    [ ] Bed bound for more than 72 hours                 (2 Points)    DISEASE RELATED RISK FACTORS                                               GENDER SPECIFIC FACTORS  [ ] Edema in the lower extremities                       (1 Point)              [ ] Pregnancy                                                     (1 Point)  [ ] Varicose veins                                               (1 Point)                     [ ] Post-partum < 6 weeks                                   (1 Point)             [ ] BMI > 25 Kg/m2                                            (1 Point)                     [ ] Hormonal therapy  or oral contraception          (1 Point)                 [ ] Sepsis (in the previous month)                        (1 Point)               [ ] History of pregnancy complications                 (1 point)  [ ] Pneumonia or serious lung disease                                               [ ] Unexplained or recurrent                     (1 Point)           (in the previous month)                               (1 Point)  [ ] Abnormal pulmonary function test                     (1 Point)                 SURGERY RELATED RISK FACTORS  [ ] Acute myocardial infarction                              (1 Point)               [ ]  Section                                             (1 Point)  [ ] Congestive heart failure (in the previous month)  (1 Point)      [ ] Minor surgery                                                  (1 Point)   [ ] Inflammatory bowel disease                             (1 Point)               [ ] Arthroscopic surgery                                        (2 Points)  [ ] Central venous access                                      (2 Points)                [X ] General surgery lasting more than 45 minutes (2 points)  [X] Malignancy- Present or previous                   (2 Points)                [ ] Elective arthroplasty                                         (5 points)    [ ] Stroke (in the previous month)                          (5 Points)                                                                                                                                                           HEMATOLOGY RELATED FACTORS                                                 TRAUMA RELATED RISK FACTORS  [ ] Prior episodes of VTE                                     (3 Points)                [ ] Fracture of the hip, pelvis, or leg                       (5 Points)  [ ] Positive family history for VTE                         (3 Points)             [ ] Acute spinal cord injury (in the previous month)  (5 Points)  [ ] Prothrombin 10391 A                                     (3 Points)               [ ] Paralysis  (less than 1 month)                             (5 Points)  [ ] Factor V Leiden                                             (3 Points)                  [ ] Multiple Trauma within 1 month                        (5 Points)  [ ] Lupus anticoagulants                                     (3 Points)                                                           [ ] Anticardiolipin antibodies                               (3 Points)                                                       [ ] High homocysteine in the blood                      (3 Points)                                             [ ] Other congenital or acquired thrombophilia      (3 Points)                                                [ ] Heparin induced thrombocytopenia                  (3 Points)                                     Total Score [    6      ]  OPIOID RISK TOOL    ANAID EACH BOX THAT APPLIES AND ADD TOTALS AT THE END    FAMILY HISTORY OF SUBSTANCE ABUSE                 FEMALE         MALE                                                Alcohol                             [  ]1 pt          [  ]3pts                                               Illegal Durgs                     [  ]2 pts        [  ]3pts                                               Rx Drugs                           [  ]4 pts        [  ]4 pts    PERSONAL HISTORY OF SUBSTANCE ABUSE                                                                                          Alcohol                             [  ]3 pts       [  ]3 pts                                               Illegal Drugs                     [  ]4 pts        [  ]4 pts                                               Rx Drugs                           [  ]5 pts        [  ]5 pts    AGE BETWEEN 16-45 YEARS                                      [  ]1 pt         [  ]1 pt    HISTORY OF PREADOLESCENT   SEXUAL ABUSE                                                             [  ]3 pts        [  ]0pts    PSYCHOLOGICAL DISEASE                     ADD, OCD, Bipolar, Schizophrenia        [  ]2 pts         [  ]2 pts                      Depression                                               [  ]1 pt           [  ]1 pt           SCORING TOTAL   (add numbers and type here)              (*0**)                                     A score of 3 or lower indicated LOW risk for future opioid abuse  A score of 4 to 7 indicated moderate risk for future opioid abuse  A score of 8 or higher indicates a high risk for opioid abuse

## 2024-08-05 NOTE — H&P PST ADULT - GASTROINTESTINAL
details… normal active bowel sounds/no guarding/no rigidity/no organomegaly/tender/distended/mass palpable normal active bowel sounds/no rigidity/no organomegaly/tender/distended/guarding/mass palpable

## 2024-08-05 NOTE — H&P PST ADULT - ALLERGIC/IMMUNOLOGIC
SUBJECTIVE:  No fetal movement yet.  Denies leaking of fluid, vaginal bleeding, or cramping.  Feels like baby is very low. Denies headache, blurry vision. She does note that she has stiffness in her hands in AM.     She is having some RUQ pain, feels like she pulled a muscle. This happened once this morning. She states that she is not having any consistent pain, just noticed it because \"you told me to tell you about RUQ pain\". Denies headache, blurry vision, spots in front of eyes. Feels like she has a lot of movement. She has noticed tightening and balling up of baby sensation.     She does not want to be checked today.     OBJECTIVE:    Visit Vitals  /55 (BP Location: RUE - Right upper extremity, Patient Position: Sitting, Cuff Size: Regular)   Pulse 79   Resp 14   Ht 5' 3\" (1.6 m)   Wt 79.2 kg (174 lb 9.6 oz)   LMP 10/19/2022 (Approximate)   SpO2 97%   BMI 30.93 kg/m²      Fundus measures 37cm.  Positive fetal heart rate.     ASSESSMENT/PLAN: :  Intrauterine pregnancy at 37w6d, S=D.    Patient Active Problem List   Diagnosis   • Encounter for supervision of normal first pregnancy in third trimester   • Primigravida of advanced maternal age in third trimester     1. Encounter for supervision of normal first pregnancy in third trimester  Assessment & Plan:  Rare contractions  Discussed signs of labor and when to go to OB Triage  Further evaluation/explanation regarding RUQ and active palpation by writer of contraction  If any other symptoms - or increased persistent pain in RUQ, headache, blurry vision, spots in front of eyes, itching occur will go to OB Triage for evaluation.   Will sweep cervical membranes at follow up in one week if appropriate and amenable  GBS negative.  Continue with Aspirin, Prenatal vitamins  Reviewed Advanced Maternal Age guidelines  2. Primigravida of advanced maternal age in third trimester  Assessment & Plan:  Reviewed guidelines  Continue with aspirin until delivery          Reviewed normal pregnancy changes and warning s/s (signs/symptoms) early pregnancy.  Pertinent labs reviewed with patient.   Return in about 1 week (around 7/18/2023).    JUNIOR Best[employer]     negative

## 2024-08-05 NOTE — H&P PST ADULT - EKG AND INTERPRETATION
sinus tachycardia at 114 with frequent premature ventricular complexes, LVH, with repolarization abnormality, No acute change. EKG pending official reading

## 2024-08-05 NOTE — H&P PST ADULT - NSICDXPASTMEDICALHX_GEN_ALL_CORE_FT
PAST MEDICAL HISTORY:  Abdominal mass     Abdominal pain     Adnexal mass     Peritoneal carcinoma      PAST MEDICAL HISTORY:  Abdominal mass     Abdominal pain     Adnexal mass     Peritoneal carcinoma     Vaginal delivery

## 2024-08-05 NOTE — H&P PST ADULT - RESPIRATORY
normal/clear to auscultation bilaterally/no wheezes/no rales/no rhonchi/no respiratory distress/no use of accessory muscles/no subcutaneous emphysema/airway patent/breath sounds equal/good air movement/respirations non-labored/no intercostal retractions/no dull ness or hyperresonance to percussion/respiratory distress/use of accessory muscles

## 2024-08-05 NOTE — H&P PST ADULT - HISTORY OF PRESENT ILLNESS
Pt is a 69 years old female seen today pre-op for CT US Guided abdominal retroperitoneal mass biopsy with anesthesia. Pt reports abdominal,, constipation.     Pt is a 69 years old female seen today pre-op for CT US Guided abdominal retroperitoneal mass biopsy with anesthesia. Pt reports she recently presented to [facility] ED for increased abdominal distension, discomfort,  imaging concerning for gyn malignancy. Pt  reports constipation, poor PO intakes due to abdominal pain and GUZMÁN. Pt denies chest pain, syncope, heart palpitation, fever/chills and any other related issues. Pt scheduled for this radiology procedure on 8/9/24  with dr. Richard       Pt is a 69 years old female seen today pre-op for CT Guided abdominal retroperitoneal mass biopsy with anesthesia. Pt reports she recently presented to [facility] ED for increased abdominal distension, discomfort,  imaging concerning for gyn malignancy. Pt  reports constipation, poor PO intakes due to abdominal pain and GUZMÁN. Pt denies chest pain, syncope, heart palpitation, fever/chills and any other related issues. Pt scheduled for this radiology procedure on 8/9/24  with dr. Richard

## 2024-08-06 PROBLEM — Z78.9 OTHER SPECIFIED HEALTH STATUS: Chronic | Status: INACTIVE | Noted: 2024-07-26 | Resolved: 2024-08-05

## 2024-08-07 ENCOUNTER — NON-APPOINTMENT (OUTPATIENT)
Age: 69
End: 2024-08-07

## 2024-08-07 ENCOUNTER — APPOINTMENT (OUTPATIENT)
Dept: CARDIOLOGY | Facility: CLINIC | Age: 69
End: 2024-08-07

## 2024-08-07 PROBLEM — N94.89 OTHER SPECIFIED CONDITIONS ASSOCIATED WITH FEMALE GENITAL ORGANS AND MENSTRUAL CYCLE: Chronic | Status: ACTIVE | Noted: 2024-08-05

## 2024-08-07 PROBLEM — R10.9 UNSPECIFIED ABDOMINAL PAIN: Chronic | Status: ACTIVE | Noted: 2024-08-05

## 2024-08-07 PROBLEM — R19.00 INTRA-ABDOMINAL AND PELVIC SWELLING, MASS AND LUMP, UNSPECIFIED SITE: Chronic | Status: ACTIVE | Noted: 2024-08-05

## 2024-08-07 PROBLEM — R06.02 SHORT OF BREATH ON EXERTION: Status: ACTIVE | Noted: 2024-08-05

## 2024-08-07 PROBLEM — R94.31 ABNORMAL ECG: Status: ACTIVE | Noted: 2024-08-07

## 2024-08-07 PROBLEM — F17.210 CIGARETTE SMOKER: Status: ACTIVE | Noted: 2024-08-05

## 2024-08-07 PROBLEM — C48.2 MALIGNANT NEOPLASM OF PERITONEUM, UNSPECIFIED: Chronic | Status: ACTIVE | Noted: 2024-08-05

## 2024-08-07 PROCEDURE — 99204 OFFICE O/P NEW MOD 45 MIN: CPT

## 2024-08-07 PROCEDURE — 93000 ELECTROCARDIOGRAM COMPLETE: CPT

## 2024-08-07 NOTE — HISTORY OF PRESENT ILLNESS
[FreeTextEntry1] : 69 year old female here for cardiac evaluation for SOB.  She has some labored breathing; she thinks due to her pressure in abdomen due to tumors.   Legs feel heavy, tired when walking.  Father had heart issues and a PPM.  Smokes about 1/2 PPD.  Has anxiety and claustrophobic.

## 2024-08-07 NOTE — PHYSICAL EXAM
Patient called again to ask if it is possible to email him his hearing test results at [email or web address]. Please let him know. [Well Developed] : well developed [No Carotid Bruit] : no carotid bruit [Normal S1, S2] : normal S1, S2 [No Murmur] : no murmur [Wheeze ____] : wheeze [unfilled] [Normal Gait] : normal gait [No Edema] : no edema [No Rash] : no rash [Moves all extremities] : moves all extremities [Alert and Oriented] : alert and oriented [de-identified] : Distended abdomen

## 2024-08-07 NOTE — DISCUSSION/SUMMARY
[EKG obtained to assist in diagnosis and management of assessed problem(s)] : EKG obtained to assist in diagnosis and management of assessed problem(s) [FreeTextEntry1] : 1) Abnormal EKG:  EKG shows signs of possible old anterior/septal infarct, also with T wave changes. Given long history of cigarette smoking she is at high risk for CAD. She also has some SOB. Will order an echocardiogram to assess LV systolic function and a stress test to assess for obstructive CAD (pharmalogical and nuclear given abnormal EKG).  2) Adnexal Mass: Requires Biopsy.  Unclear whether biopsy will be performed with local or general anesthesia.  Ideally, would prefer local anesthesia or conscious sedation if done before cardiac testing.  Given the urgency to make a tissue diagnosis, I do not want to delay the biopsy, however if done with general anesthesia would then prefer to have the echo and stress test done before hand.   3) SOB: Will get echo and stress test as noted above  4) Cigarette smoking: Recommended to reduce amount, ideally quit.   Will f/u after testing completed.

## 2024-08-08 NOTE — REVIEW OF SYSTEMS
[Fatigue] : fatigue [Night Sweats] : night sweats [Cough] : cough [Constipation] : constipation [Itching] : Itching [Fever] : no fever [Vision Problems] : no vision problems [Hearing Loss] : no hearing loss [Sore Throat] : no sore throat [Chest Pain] : no chest pain [Palpitations] : no palpitations [Lower Ext Edema] : no lower extremity edema [Orthopnea] : no orthopnea [Shortness Of Breath] : no shortness of breath [Nausea] : no nausea [Diarrhea] : diarrhea [Vomiting] : no vomiting [Dysuria] : no dysuria [Incontinence] : no incontinence [Hematuria] : no hematuria [Joint Pain] : no joint pain [Muscle Pain] : no muscle pain [Headache] : no headache [Dizziness] : no dizziness [Suicidal] : not suicidal [Confusion] : no confusion [Depression] : no depression

## 2024-08-08 NOTE — ADDENDUM
[FreeTextEntry1] : Addendum:  PST's reviewed, to repeat platelet counts before surgery and consult hematology, CXR pending- would prefer to have it done before surgery. She was seen by cardiology - advised stress test and echo before procedure. Given the urgency to make a tissue diagnosis, if we cannot delay the procedure, would prefer the procedure to be done under local anesthesia or conscious sedation as outlined by cardiology. Please call with any questions.

## 2024-08-08 NOTE — PHYSICAL EXAM
[Normal Outer Ear/Nose] : the outer ears and nose were normal in appearance [Normal Oropharynx] : the oropharynx was normal [No JVD] : no jugular venous distention [No Lymphadenopathy] : no lymphadenopathy [Normal] : soft, non-tender, non-distended, no masses palpated, no HSM and normal bowel sounds [Normal Anterior Cervical Nodes] : no anterior cervical lymphadenopathy [No CVA Tenderness] : no CVA  tenderness [No Spinal Tenderness] : no spinal tenderness [No Joint Swelling] : no joint swelling [Coordination Grossly Intact] : coordination grossly intact [Speech Grossly Normal] : speech grossly normal [Normal Affect] : the affect was normal [Alert and Oriented x3] : oriented to person, place, and time [Normal Mood] : the mood was normal [Normal Insight/Judgement] : insight and judgment were intact [Acne] : no acne [de-identified] : +1 edema BL

## 2024-08-08 NOTE — HISTORY OF PRESENT ILLNESS
[Friend] : friend [FreeTextEntry8] : 69 year old female with pmhx of psoriasis presenting to establish care prior to beginning oncology care after being found to have bilateral adnexal masses and peritoneal carcinomatosis in the [facility] ED on 07/26/24. Patient is scheduled for a biopsy on 08/09/24. Prior to presenting to the ED, patient had progressive abdominal discomfort and distention for about 2 weeks. denies pain. Patient decided to present for examination when she began feeling constipated, prior to this episode patients bowel movements were very regular. LBM 5 days ago. reports occassional nausea Patient is inquiring about laxative use. Patient endorses decrease PO intake 2/2 to appetite and abdominal discomfort. Denies wt. loss, rectal bleeding or bloody stools, or difficulties with urination. Patient denies CP, HA, Dz.   Denies past surgeries. Mother had breast ca. Father had skin cancer, and crohns. Brothers had pancreatic cancer, and skin cancer. Sister also had skin cancer.    LMP in her 50s. Patient has not been to an OBGYN prior to her ED visit in about 50 years. Has not seen a PCP in about 4-5 years prior to her current issues. NKDA.  Patient is an active smoker, patient has been smoking for 50 years, smokes about a half pack daily. Denies SOB at rest, but mentions GUZMÁN occasionally. Patient has one flight of stairs to reach her apartment and is able to do them without SOB. Her GUZMÁN is intermittent and she cannot identify triggers. Patient has a chronic cough.    CT A/P 7/26/2024: Bilateral adnexal masses measuring 3.0 x 2.0 cm on the right and 5.7 x 1.0 cm on the left. Uterus is unremarkable. Moderate ascites. Extensive peritoneal carcinomatosis. Upper abdominal mass 7.1 x 4.8 cm. Right mid abdominal mass 5.3 x 4.1 cm. Lower abdominal mass 6.2 x 5.3 cm. Wall thickening of the sigmoid colon, which may reflect the presence of serosal implants. Nodularity in the cul-de-sac and along the paracolic gutters, as well as along the bladder dome. No lymphadenopathy.

## 2024-08-08 NOTE — HEALTH RISK ASSESSMENT
[Yes] : Yes [4 or more  times a week (4 pts)] : 4 or more  times a week (4 points) [1 or 2 (0 pts)] : 1 or 2 (0 points) [Never (0 pts)] : Never (0 points) [No] : In the past 12 months have you used drugs other than those required for medical reasons? No [No falls in past year] : Patient reported no falls in the past year [0] : 2) Feeling down, depressed, or hopeless: Not at all (0) [PHQ-2 Negative - No further assessment needed] : PHQ-2 Negative - No further assessment needed [Current] : Current [20 or more] : 20 or more [Audit-CScore] : 4

## 2024-08-08 NOTE — PHYSICAL EXAM
[Normal Outer Ear/Nose] : the outer ears and nose were normal in appearance [Normal Oropharynx] : the oropharynx was normal [No JVD] : no jugular venous distention [No Lymphadenopathy] : no lymphadenopathy [Normal] : soft, non-tender, non-distended, no masses palpated, no HSM and normal bowel sounds [Normal Anterior Cervical Nodes] : no anterior cervical lymphadenopathy [No CVA Tenderness] : no CVA  tenderness [No Spinal Tenderness] : no spinal tenderness [No Joint Swelling] : no joint swelling [Coordination Grossly Intact] : coordination grossly intact [Speech Grossly Normal] : speech grossly normal [Normal Affect] : the affect was normal [Alert and Oriented x3] : oriented to person, place, and time [Normal Mood] : the mood was normal [Normal Insight/Judgement] : insight and judgment were intact [Acne] : no acne [de-identified] : +1 edema BL

## 2024-08-08 NOTE — HISTORY OF PRESENT ILLNESS
[Friend] : friend [FreeTextEntry8] : 69 year old female with pmhx of psoriasis presenting to establish care prior to beginning oncology care after being found to have bilateral adnexal masses and peritoneal carcinomatosis in the [facility] ED on 07/26/24. Patient is scheduled for a biopsy on 08/09/24. Prior to presenting to the ED, patient had progressive abdominal discomfort and distention for about 2 weeks. denies pain. Patient decided to present for examination when she began feeling constipated, prior to this episode patients bowel movements were very regular. LBM 5 days ago. reports occassional nausea Patient is inquiring about laxative use. Patient endorses decrease PO intake 2/2 to appetite and abdominal discomfort. Denies wt. loss, rectal bleeding or bloody stools, or difficulties with urination. Patient denies CP, HA, Dz.   Denies past surgeries. Mother had breast ca. Father had skin cancer, and crohns. Brothers had pancreatic cancer, and skin cancer. Sister also had skin cancer.    LMP in her 50s. Patient has not been to an OBGYN prior to her ED visit in about 50 years. Has not seen a PCP in about 4-5 years prior to her current issues. NKDA.  Patient is an active smoker, patient has been smoking for 50 years, smokes about a half pack daily. Denies SOB at rest, but mentions GUZMÁN occasionally. Patient has one flight of stairs to reach her apartment and is able to do them without SOB. Her GUZMÁN is intermittent and she cannot identify triggers. Patient has a chronic cough.    CT A/P 7/26/2024: Bilateral adnexal masses measuring 3.0 x 2.0 cm on the right and 5.7 x 1.0 cm on the left. Uterus is unremarkable. Moderate ascites. Extensive peritoneal carcinomatosis. Upper abdominal mass 7.1 x 4.8 cm. Right mid abdominal mass 5.3 x 4.1 cm. Lower abdominal mass 6.2 x 5.3 cm. Wall thickening of the sigmoid colon, which may reflect the presence of serosal implants. Nodularity in the cul-de-sac and along the paracolic gutters, as well as along the bladder dome. No lymphadenopathy.

## 2024-08-08 NOTE — PLAN
[FreeTextEntry1] : A/P: - Patient will have her labs and EKG from presurgical appointment faxed to us - Patient was informed that due to her intermittent GUZMÁN, it is our advisement that she sees a cardiologist for clearance prior to proceeding with her biopsy, Referral form given, provider will call and see if expedition is possible.  CXR  -  Counseled to quit smoking, patient aware of all possible risks including of developing lung cancer, COPD and cardiovascular risks. - Constipation: to inc oral fluids / fiber in diet , to c/w laxative - to f/u after biopsy for CPE - Patient was informed she is a candidate for Lung Ca screening, and overdue on other health maintenance such as colonoscopy, and mammogram

## 2024-08-09 ENCOUNTER — RESULT REVIEW (OUTPATIENT)
Age: 69
End: 2024-08-09

## 2024-08-09 ENCOUNTER — OUTPATIENT (OUTPATIENT)
Dept: OUTPATIENT SERVICES | Facility: HOSPITAL | Age: 69
LOS: 1 days | End: 2024-08-09
Payer: COMMERCIAL

## 2024-08-09 ENCOUNTER — TRANSCRIPTION ENCOUNTER (OUTPATIENT)
Age: 69
End: 2024-08-09

## 2024-08-09 VITALS
DIASTOLIC BLOOD PRESSURE: 96 MMHG | HEART RATE: 117 BPM | SYSTOLIC BLOOD PRESSURE: 130 MMHG | HEIGHT: 64 IN | TEMPERATURE: 98 F | OXYGEN SATURATION: 98 % | RESPIRATION RATE: 23 BRPM | WEIGHT: 119.05 LBS

## 2024-08-09 DIAGNOSIS — K66.8 OTHER SPECIFIED DISORDERS OF PERITONEUM: ICD-10-CM

## 2024-08-09 DIAGNOSIS — N94.89 OTHER SPECIFIED CONDITIONS ASSOCIATED WITH FEMALE GENITAL ORGANS AND MENSTRUAL CYCLE: ICD-10-CM

## 2024-08-09 PROCEDURE — 88305 TISSUE EXAM BY PATHOLOGIST: CPT | Mod: 26

## 2024-08-09 PROCEDURE — 49180 BIOPSY ABDOMINAL MASS: CPT

## 2024-08-09 PROCEDURE — 49083 ABD PARACENTESIS W/IMAGING: CPT

## 2024-08-09 PROCEDURE — 88360 TUMOR IMMUNOHISTOCHEM/MANUAL: CPT

## 2024-08-09 PROCEDURE — 88334 PATH CONSLTJ SURG CYTO XM EA: CPT | Mod: 26

## 2024-08-09 PROCEDURE — 88342 IMHCHEM/IMCYTCHM 1ST ANTB: CPT | Mod: 26

## 2024-08-09 PROCEDURE — C1729: CPT

## 2024-08-09 PROCEDURE — 76942 ECHO GUIDE FOR BIOPSY: CPT

## 2024-08-09 PROCEDURE — 77012 CT SCAN FOR NEEDLE BIOPSY: CPT | Mod: 26,XU

## 2024-08-09 PROCEDURE — 88333 PATH CONSLTJ SURG CYTO XM 1: CPT | Mod: 26

## 2024-08-09 PROCEDURE — 88342 IMHCHEM/IMCYTCHM 1ST ANTB: CPT

## 2024-08-09 PROCEDURE — 88341 IMHCHEM/IMCYTCHM EA ADD ANTB: CPT | Mod: 26

## 2024-08-09 PROCEDURE — 88333 PATH CONSLTJ SURG CYTO XM 1: CPT

## 2024-08-09 PROCEDURE — 88341 IMHCHEM/IMCYTCHM EA ADD ANTB: CPT

## 2024-08-09 PROCEDURE — 88305 TISSUE EXAM BY PATHOLOGIST: CPT

## 2024-08-09 PROCEDURE — 77012 CT SCAN FOR NEEDLE BIOPSY: CPT

## 2024-08-09 RX ORDER — BACTERIOSTATIC SODIUM CHLORIDE 0.9 %
3 VIAL (ML) INJECTION ONCE
Refills: 0 | Status: ACTIVE | OUTPATIENT
Start: 2024-08-09

## 2024-08-09 NOTE — PROCEDURE NOTE - PROCEDURE FINDINGS AND DETAILS
six 18g core bx of abdominal carcinomatosis obtained w 18g needle under CT guidance. Sample adequacy confirmed by cytopathology.     Please call Interventional Radiology with any questions, concerns, or issues. 2L of ascites was drained from abdomen prior to biopsy.    six 18g core bx of abdominal carcinomatosis obtained w 18g needle under CT guidance. Sample adequacy confirmed by cytopathology.     Please call Interventional Radiology with any questions, concerns, or issues.

## 2024-08-09 NOTE — ASU DISCHARGE PLAN (ADULT/PEDIATRIC) - NS MD DC FALL RISK RISK
Chief complaint:   Chief Complaint   Patient presents with   • Sinus Problem       Vitals:  Visit Vitals  /84 (BP Location: RUE - Right upper extremity, Patient Position: Sitting, Cuff Size: Regular)   Pulse 88   Temp 97.5 °F (36.4 °C) (Oral)   Resp 14   Ht 5' 5\" (1.651 m)   Wt 61 kg   LMP 11/24/2008   SpO2 100%   BMI 22.38 kg/m²       HISTORY OF PRESENT ILLNESS     Patient presents urgent care with sinus congestion sore throat and ears popping. Patient has been taking that he pot and Flonase with some temporary relief. She denies any fevers. She does report increasing sinus pressure.      Other significant problems:  Patient Active Problem List    Diagnosis Date Noted   • Coronary artery disease involving native coronary artery of native heart without angina pectoris 12/02/2019     Priority: Low   • Deviated nasal septum 07/25/2018     Priority: Low   • Nasal turbinate hypertrophy 07/25/2018     Priority: Low   • Nasal obstruction 07/25/2018     Priority: Low   • Abdominal pain 03/13/2018     Priority: Low   • Chronic neck pain 02/28/2018     Priority: Low   • Muscle tension headache 02/28/2018     Priority: Low   • Chronic pansinusitis 02/28/2018     Priority: Low   • Diastolic dysfunction 12/09/2016     Priority: Low   • Vasovagal syncope 09/06/2016     Priority: Low     Only 1 episode due to dehydration and stress.      • Essential hypertension 09/06/2016     Priority: Low     [place] hypertension     • Colon polyps 05/31/2016     Priority: Low   • Osteoarthrosis 05/31/2016     Priority: Low   • S/P total hip arthroplasty 09/02/2015     Priority: Low   • Esophageal reflux 01/10/2012     Priority: Low   • Allergic rhinitis, cause unspecified 08/29/2011     Priority: Low       PAST MEDICAL, FAMILY AND SOCIAL HISTORY     Medications:  Current Outpatient Medications   Medication   • mometasone (NASONEX) 50 MCG/ACT nasal spray   • amLODIPine (NORVASC) 5 MG tablet   • famotidine (PEPCID) 20 MG tablet   •  fluticasone (FLONASE) 50 MCG/ACT nasal spray   • atorvastatin (LIPITOR) 20 MG tablet   • albuterol (PROAIR HFA) 108 (90 Base) MCG/ACT inhaler   • aspirin 81 MG tablet   • acetaminophen (TYLENOL) 325 MG tablet   • ibuprofen (MOTRIN) 200 MG tablet   • vitamin E 400 UNIT capsule   • cholecalciferol (VITAMIN D3) 1000 UNIT tablet   • amoxicillin (AMOXIL) 500 MG capsule   • nitroGLYcerin (NITROSTAT) 0.4 MG sublingual tablet     No current facility-administered medications for this visit.        Allergies:  ALLERGIES:   Allergen Reactions   • Hydrochlorothiazide HEADACHES   • Micardis [Telmisartan] HEADACHES     Increased heart rate, acute sinusitis.   • Seasonal Other (See Comments)     Nasal congestion       Past Medical  History/Surgeries:  Past Medical History:   Diagnosis Date   • Allergy, unspecified not elsewhere classified     Worse in spring   • Arthritis of left hip 2015 8/2015 replaced   • CAD (coronary artery disease)    • Deviated nasal septum 1999    Fall on black ice   • Fundic gland polyps of stomach, benign 07/25/2007   • GERD 1999   • HTN (hypertension) 02/23/12    norvasc   • Hyperplastic colon polyp 07/25/2007    5 year recall   • Internal hemorrhoids    • Menopause 2008   • Sciatica     went away when hip replaced.    • Sinus headache     recurrent       Past Surgical History:   Procedure Laterality Date   • Cardiac catheterization/possible ptca/possible stent  06/18/2019   • Cardiac stress test complete  2007    WNL   • Colonoscopy w biopsy  07/25/2007    internal hemorrhoids, hyperplastic polyp   • Colonoscopy w/ polypectomy  6/13/16    dr goins, hyperplastic polyp, recall 5 years    • Cryocautery of cervix  1983    ? diagnosis.    • Dexa bone density axial skeleton  12/2009    WNL   • Esophagogastroduodenoscopy transoral flex w/bx single or mult  07/25/2007    normal esophagus. No evidence of Reeves'sMultiple gastric and antral erosions. Gastric polyp. Biopsy: gastric mucosa without pathologic  abnormality. Fundic gland polyp.    • Joint replacement Left 8/18/2015    left total hip replacement   • Ligate fallopian tube  1/1/1983   • Mri knee joint w wo contrast left  12/04/13   • Remove tonsils/adenoids,<11 y/o  1959   • Us pelvis complete non ob  5/29/14    Endometrial thickness = 0.8mm   • Xray hip 2 vw left  04/19/11    narrowing joint space       Family History:  Family History   Problem Relation Age of Onset   • Diabetes Mother    • Heart Mother         low blood pressure.    • Other Mother         digestive problems, no diagnosis.    • Heart Father    • Hypertension Father    • Heart disease Father 40        multiple bypass surgeries   • Diabetes Brother    • High blood pressure Brother    • Obesity Brother    • Myocardial Infarction Maternal Grandfather        Social History:  Social History     Tobacco Use   • Smoking status: Passive Smoke Exposure - Never Smoker   • Smokeless tobacco: Never Used   • Tobacco comment: exposed to smoke until age 11 yo when mom quit   Substance Use Topics   • Alcohol use: Yes     Alcohol/week: 0.0 standard drinks     Frequency: Monthly or less     Drinks per session: 1 or 2     Binge frequency: Never     Comment: Maybe 3 or 4 times / year       REVIEW OF SYSTEMS     Review of Systems   Constitutional: Negative for fever.   HENT: Positive for congestion, sinus pressure and sinus pain. Negative for ear discharge.    Eyes: Negative for photophobia.   Respiratory: Negative for cough.    Cardiovascular: Negative for chest pain.   Gastrointestinal: Negative for nausea.   Musculoskeletal: Negative for myalgias.   Skin: Negative for rash.   Allergic/Immunologic: Positive for environmental allergies.   Neurological: Positive for headaches.       PHYSICAL EXAM     Physical Exam  Vitals signs and nursing note reviewed.   Constitutional:       Appearance: Normal appearance.   HENT:      Head: Normocephalic and atraumatic.      Right Ear: Tympanic membrane and ear canal normal.       Left Ear: Tympanic membrane normal.      Nose: Congestion present. No rhinorrhea.      Mouth/Throat:      Mouth: Mucous membranes are moist.      Pharynx: Oropharynx is clear.   Eyes:      Conjunctiva/sclera: Conjunctivae normal.   Neck:      Musculoskeletal: Normal range of motion and neck supple.   Cardiovascular:      Rate and Rhythm: Normal rate and regular rhythm.   Pulmonary:      Effort: Pulmonary effort is normal.      Breath sounds: Normal breath sounds.   Musculoskeletal: Normal range of motion.      Comments: Patient moves all extremities symmetrically   Skin:     General: Skin is warm and dry.   Neurological:      Mental Status: She is alert and oriented to person, place, and time.   Psychiatric:         Mood and Affect: Mood normal.         Behavior: Behavior normal.         ASSESSMENT/PLAN     Assessment:  Sinusitis  Eustachian tube dysfunction    Plan:  Patient will be prescribed Augmentin. Patient is advised to Take antibiotics as prescribed until gone.  Use over-the-counter Flonase and that he pot as directed.  Recheck with primary doctor in a week if not improving.    Patient was seen in conjunction with Dr. Tomlinson   For information on Fall & Injury Prevention, visit: [email or web address] OR  [email or web address] OR  https://www.cdc.gov/steadi/patient.html

## 2024-08-09 NOTE — ASU DISCHARGE PLAN (ADULT/PEDIATRIC) - CARE PROVIDER_API CALL
Frantz Martinez  Gynecologic Oncology  [address] [place] [id number]  Phone: [phone]  Fax: [phone]  Follow Up Time:

## 2024-08-12 ENCOUNTER — APPOINTMENT (OUTPATIENT)
Dept: INTERNAL MEDICINE | Facility: CLINIC | Age: 69
End: 2024-08-12
Payer: MEDICARE

## 2024-08-12 ENCOUNTER — APPOINTMENT (OUTPATIENT)
Dept: RADIOLOGY | Facility: CLINIC | Age: 69
End: 2024-08-12

## 2024-08-12 VITALS
BODY MASS INDEX: 19.46 KG/M2 | HEART RATE: 126 BPM | WEIGHT: 114 LBS | OXYGEN SATURATION: 96 % | SYSTOLIC BLOOD PRESSURE: 137 MMHG | HEIGHT: 64 IN | TEMPERATURE: 95.9 F | DIASTOLIC BLOOD PRESSURE: 84 MMHG

## 2024-08-12 DIAGNOSIS — N94.89 OTHER SPECIFIED CONDITIONS ASSOCIATED WITH FEMALE GENITAL ORGANS AND MENSTRUAL CYCLE: ICD-10-CM

## 2024-08-12 DIAGNOSIS — R06.02 SHORTNESS OF BREATH: ICD-10-CM

## 2024-08-12 DIAGNOSIS — D75.839 THROMBOCYTOSIS, UNSPECIFIED: ICD-10-CM

## 2024-08-12 PROCEDURE — G2211 COMPLEX E/M VISIT ADD ON: CPT

## 2024-08-12 PROCEDURE — 99214 OFFICE O/P EST MOD 30 MIN: CPT

## 2024-08-12 NOTE — PLAN
[FreeTextEntry1] : Adnexal Mass: s/p biopsy - await results to f/u Onc Thrombocytosis - will d/w hematology - has apt  SOB - to get CXR / cardiac testing as ordered  f/u after

## 2024-08-12 NOTE — HISTORY OF PRESENT ILLNESS
[de-identified] : Adnexal mass: Here for f/u after biopsy , awaiting path results - has apt to see Onc tomorrow  here to discuss labs  SOB - w/u underway - seen by cardiology - will be going for echo / stress test  has apt to do CXR  denies any abd pain or fever  [FreeTextEntry1] : duplicate note

## 2024-08-12 NOTE — HISTORY OF PRESENT ILLNESS
[de-identified] : Adnexal mass: Here for f/u after biopsy , awaiting path results - has apt to see Onc tomorrow  here to discuss labs  SOB - w/u underway - seen by cardiology - will be going for echo / stress test  has apt to do CXR  denies any abd pain or fever  [FreeTextEntry1] : duplicate note

## 2024-08-13 ENCOUNTER — APPOINTMENT (OUTPATIENT)
Dept: HEMATOLOGY ONCOLOGY | Facility: CLINIC | Age: 69
End: 2024-08-13

## 2024-08-13 ENCOUNTER — OUTPATIENT (OUTPATIENT)
Dept: OUTPATIENT SERVICES | Facility: HOSPITAL | Age: 69
LOS: 1 days | Discharge: HOME | End: 2024-08-13

## 2024-08-13 ENCOUNTER — NON-APPOINTMENT (OUTPATIENT)
Age: 69
End: 2024-08-13

## 2024-08-13 ENCOUNTER — APPOINTMENT (OUTPATIENT)
Dept: GYNECOLOGIC ONCOLOGY | Facility: CLINIC | Age: 69
End: 2024-08-13
Payer: MEDICARE

## 2024-08-13 ENCOUNTER — RESULT REVIEW (OUTPATIENT)
Age: 69
End: 2024-08-13

## 2024-08-13 VITALS
OXYGEN SATURATION: 96 % | TEMPERATURE: 98.2 F | WEIGHT: 115 LBS | DIASTOLIC BLOOD PRESSURE: 80 MMHG | BODY MASS INDEX: 20.38 KG/M2 | SYSTOLIC BLOOD PRESSURE: 134 MMHG | HEIGHT: 63 IN | RESPIRATION RATE: 17 BRPM | HEART RATE: 122 BPM

## 2024-08-13 DIAGNOSIS — C56.9 MALIGNANT NEOPLASM OF UNSPECIFIED OVARY: ICD-10-CM

## 2024-08-13 DIAGNOSIS — D64.9 ANEMIA, UNSPECIFIED: ICD-10-CM

## 2024-08-13 PROCEDURE — 99214 OFFICE O/P EST MOD 30 MIN: CPT

## 2024-08-13 PROCEDURE — G2211 COMPLEX E/M VISIT ADD ON: CPT

## 2024-08-13 NOTE — HISTORY OF PRESENT ILLNESS
[Disease: _____________________] : Disease: [unfilled] [de-identified] : 69 year old female presents today with newly diagnosed b/l adnexal masses and extensive peritoneal carcinomatosis found on CT imaging.   Patient originally presents to [facility] ED on 7/26/24 for worsening abdominal distention and pain x 1 month with associated constipation. CT revealed Bilateral adnexal masses measuring 3.0 x 2.0 cm on the right and 5.7 x 1.0 cm on the left. Uterus is unremarkable. Moderate ascites. Extensive peritoneal carcinomatosis. Patient was discharged with GYN ONC follow up. Patient is now s/p omental biopsy on 8/9, path pending. S/p paracentesis 8/9, 2000cc drained. Dr Martinez referred to med onc for neoadjuvant chemotherapy.   Blood work 7/2024: : 2099 CA 19-9: 18 CEA: 1.9 HE4: 1345.0  CT A/P 7/26/2024: Bilateral adnexal masses measuring 3.0 x 2.0 cm on the right and 5.7 x 1.0 cm on the left. Uterus is unremarkable. Moderate ascites. Extensive peritoneal carcinomatosis. Upper abdominal mass 7.1 x 4.8 cm. Right mid abdominal mass 5.3 x 4.1 cm. Lower abdominal mass 6.2 x 5.3 cm. Wall thickening of the sigmoid colon, which may reflect the presence of serosal implants. Nodularity in the cul-de-sac and along the paracolic gutters, as well as along the bladder dome. No lymphadenopathy.  MedHx: None SurgHx: None FamHx: Breast (mother, dx 70s), Pancreatic (Brother, dx 60s), Melanoma (Brother) SocialHx: Active tobacco use (1/2pack per day x50yrs), Social EtOH, Denies illicit drug use Medications: None Allergies: NKDA  HCM Lpap: >10yrs Lmammo: >10yrs Lcscope: Never  [de-identified] : Pending [de-identified] : 69 year old female presents today with newly diagnosed b/l adnexal masses and extensive peritoneal carcinomatosis found on CT imaging, c/f at least Stage III ovarian vs. primary peritoneal (IR biopsy from 8/9/24 pending).  Pt reports feeling early satiety, abdominal distension/bloating. Using an abdominal binder with mild relief. +Constipation. S/p IR drainage 2000L on 08/09/24 at time of omental biopsy. Tolerating PO w/o nausea/vomiting. Pt reports that she had not been regularly following with doctors prior to this episode of abdominal distension/constipation/abdominal pain at end of July.  Pt has ECHO and stress test scheduled 8/19 and 8/26, recommended by PCP (prior to IR biopsy for intermittent GUZMÁN). Of note, pt tachycardic on exam today. Endorses significant anxiety regarding cancer diagnosis/doctor's offices.

## 2024-08-13 NOTE — HISTORY OF PRESENT ILLNESS
[Disease: _____________________] : Disease: [unfilled] [de-identified] : 69 year old female presents today with newly diagnosed b/l adnexal masses and extensive peritoneal carcinomatosis found on CT imaging.   Patient originally presents to [facility] ED on 7/26/24 for worsening abdominal distention and pain x 1 month with associated constipation. CT revealed Bilateral adnexal masses measuring 3.0 x 2.0 cm on the right and 5.7 x 1.0 cm on the left. Uterus is unremarkable. Moderate ascites. Extensive peritoneal carcinomatosis. Patient was discharged with GYN ONC follow up. Patient is now s/p omental biopsy on 8/9, path pending. S/p paracentesis 8/9, 2000cc drained. Dr Martinez referred to med onc for neoadjuvant chemotherapy.   Blood work 7/2024: : 2099 CA 19-9: 18 CEA: 1.9 HE4: 1345.0  CT A/P 7/26/2024: Bilateral adnexal masses measuring 3.0 x 2.0 cm on the right and 5.7 x 1.0 cm on the left. Uterus is unremarkable. Moderate ascites. Extensive peritoneal carcinomatosis. Upper abdominal mass 7.1 x 4.8 cm. Right mid abdominal mass 5.3 x 4.1 cm. Lower abdominal mass 6.2 x 5.3 cm. Wall thickening of the sigmoid colon, which may reflect the presence of serosal implants. Nodularity in the cul-de-sac and along the paracolic gutters, as well as along the bladder dome. No lymphadenopathy.  MedHx: None SurgHx: None FamHx: Breast (mother, dx 70s), Pancreatic (Brother, dx 60s), Melanoma (Brother) SocialHx: Active tobacco use (1/2pack per day x50yrs), Social EtOH, Denies illicit drug use Medications: None Allergies: NKDA  HCM Lpap: >10yrs Lmammo: >10yrs Lcscope: Never  [de-identified] : Pending [de-identified] : 69 year old female presents today with newly diagnosed b/l adnexal masses and extensive peritoneal carcinomatosis found on CT imaging, c/f at least Stage III ovarian vs. primary peritoneal (IR biopsy from 8/9/24 pending).  Pt reports feeling early satiety, abdominal distension/bloating. Using an abdominal binder with mild relief. +Constipation. S/p IR drainage 2000L on 08/09/24 at time of omental biopsy. Tolerating PO w/o nausea/vomiting. Pt reports that she had not been regularly following with doctors prior to this episode of abdominal distension/constipation/abdominal pain at end of July.  Pt has ECHO and stress test scheduled 8/19 and 8/26, recommended by PCP (prior to IR biopsy for intermittent GUZMÁN). Of note, pt tachycardic on exam today. Endorses significant anxiety regarding cancer diagnosis/doctor's offices.

## 2024-08-13 NOTE — PLAN
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

## 2024-08-13 NOTE — REASON FOR VISIT
[Initial Consultation] : an initial consultation [Family Member] : family member [FreeTextEntry2] : extensive peritoneal carcinomatosis

## 2024-08-13 NOTE — ASSESSMENT
[FreeTextEntry1] : 69 year old female presents today with newly diagnosed b/l adnexal masses and extensive peritoneal carcinomatosis found on CT imaging, c/f at least Stage BARBARA ovarian vs. primary peritoneal (IR biopsy from 8/9/24 pending). Extensive discussion regarding late stage ovarian cancer treatment options/risks/benefits/alternatives. Will await final pathology before determining addition of immunotherapies. Additionally, addition of Bevacizumab upfront in neoadjuvant setting has both risks and benefits. Given pt's ongoing tobacco abuse, elevated risk of VTE with addition of Bevacizumab concerning. Discussed with patient these concerns, may reserve Bevacizumab for adjuvant treatment. Chemotherapy regimen prescribed: Carbo AUC 5 / Taxol 175mg/m2 +/- Bevacizumab 15mg/kg D1 q21d x 3C followed by repeat imaging +/- interval debulk with HIPEC followed by at least 2-3C adjuvant treatment  Risks discussed: Carboplatin can be associated with low blood counts, including platelets with associated bleeding, white blood cells with risk of infection or sepsis, and anemia. The drug will cause nausea and vomiting, but medication will be given to decrease these effects. There is a risk of renal, hepatic and electrolyte abnormalities. There can be infusion reactions, which are more likely with more cycles administered. This drug is an irritant and may cause local reactions if extravasation occurs. Taxol will cause alopecia and can be associated with peripheral neuropathy which can be permanent or progressive. It can also cause decreased blood counts including white blood cells, with risk of infection or sepsis, anemia and thrombocytopenia. There is a risk of nausea and vomiting, mouth sores, and bone or muscle pain. Infusion can cause hypersensitivity reactions. This drug is an irritant and may cause local reactions if extravasation occurs. Bevacizumab can cause hypertension, which will be monitored closely as the drug is initiated and may require medication to control. Renal dysfunction which is monitored by checking the urine for protein. <10% risk of blood clots, stroke or heart attacks. There is a small risk of GI perforation or fistula formation, <5%. There can be delayed wound healing. Additionally, it is associated with fatigue.

## 2024-08-13 NOTE — PHYSICAL EXAM
[Fully active, able to carry on all pre-disease performance without restriction] : Status 0 - Fully active, able to carry on all pre-disease performance without restriction [Thin] : thin [Normal] : affect appropriate [de-identified] : Mod distended; no rigidity/rebound/guardin; + Fluid wave, + BS [de-identified] : Deferred

## 2024-08-13 NOTE — REVIEW OF SYSTEMS
[Diarrhea: Grade 0] : Diarrhea: Grade 0 [Negative] : Allergic/Immunologic [FreeTextEntry7] : Constipation, early satiety; Denies nausea/vomiting, still able to tolerate PO

## 2024-08-13 NOTE — PHYSICAL EXAM
[Fully active, able to carry on all pre-disease performance without restriction] : Status 0 - Fully active, able to carry on all pre-disease performance without restriction [Thin] : thin [Normal] : affect appropriate [de-identified] : Mod distended; no rigidity/rebound/guardin; + Fluid wave, + BS [de-identified] : Deferred

## 2024-08-14 NOTE — GOALS
[Patient] : patient [FreeTextEntry7] : Health Care Proxy form and information provided. All questions answered.

## 2024-08-15 LAB
ALBUMIN SERPL ELPH-MCNC: 3.2 G/DL
ALP BLD-CCNC: 106 U/L
ALT SERPL-CCNC: 10 U/L
ANION GAP SERPL CALC-SCNC: 14 MMOL/L
AST SERPL-CCNC: 17 U/L
BILIRUB SERPL-MCNC: 0.2 MG/DL
BUN SERPL-MCNC: 16 MG/DL
CALCIUM SERPL-MCNC: 9.3 MG/DL
CANCER AG125 SERPL-ACNC: 1556 U/ML
CHLORIDE SERPL-SCNC: 95 MMOL/L
CO2 SERPL-SCNC: 25 MMOL/L
CREAT SERPL-MCNC: 0.72 MG/DL
EGFR: 89 ML/MIN/1.73M2
GLUCOSE SERPL-MCNC: 114 MG/DL
HBV CORE IGG+IGM SER QL: NONREACTIVE
HBV SURFACE AB SER QL: NONREACTIVE
HBV SURFACE AG SER QL: NONREACTIVE
INR PPP: 1.04 RATIO
MAGNESIUM SERPL-MCNC: 1.7 MG/DL
POTASSIUM SERPL-SCNC: 5.2 MMOL/L
PROT SERPL-MCNC: 6 G/DL
PT BLD: 11.7 SEC
SODIUM SERPL-SCNC: 133 MMOL/L

## 2024-08-19 ENCOUNTER — APPOINTMENT (OUTPATIENT)
Dept: CARDIOLOGY | Facility: CLINIC | Age: 69
End: 2024-08-19
Payer: MEDICARE

## 2024-08-19 PROCEDURE — 93306 TTE W/DOPPLER COMPLETE: CPT

## 2024-08-20 ENCOUNTER — INPATIENT (INPATIENT)
Facility: HOSPITAL | Age: 69
LOS: 5 days | Discharge: HOME | DRG: 293 | End: 2024-08-26
Attending: INTERNAL MEDICINE | Admitting: INTERNAL MEDICINE
Payer: COMMERCIAL

## 2024-08-20 VITALS
WEIGHT: 117.07 LBS | HEART RATE: 115 BPM | RESPIRATION RATE: 18 BRPM | SYSTOLIC BLOOD PRESSURE: 117 MMHG | TEMPERATURE: 98 F | DIASTOLIC BLOOD PRESSURE: 76 MMHG | HEIGHT: 64 IN | OXYGEN SATURATION: 97 %

## 2024-08-20 DIAGNOSIS — I50.9 HEART FAILURE, UNSPECIFIED: ICD-10-CM

## 2024-08-20 DIAGNOSIS — C78.6 SECONDARY MALIGNANT NEOPLASM OF RETROPERITONEUM AND PERITONEUM: ICD-10-CM

## 2024-08-20 LAB
ALBUMIN SERPL ELPH-MCNC: 3 G/DL — LOW (ref 3.3–5.2)
ALP SERPL-CCNC: 91 U/L — SIGNIFICANT CHANGE UP (ref 40–120)
ALT FLD-CCNC: 9 U/L — SIGNIFICANT CHANGE UP
ANION GAP SERPL CALC-SCNC: 11 MMOL/L — SIGNIFICANT CHANGE UP (ref 5–17)
AST SERPL-CCNC: 16 U/L — SIGNIFICANT CHANGE UP
BILIRUB DIRECT SERPL-MCNC: 0.1 MG/DL — SIGNIFICANT CHANGE UP (ref 0–0.3)
BILIRUB INDIRECT FLD-MCNC: 0.1 MG/DL — LOW (ref 0.2–1)
BILIRUB SERPL-MCNC: 0.2 MG/DL — LOW (ref 0.4–2)
BUN SERPL-MCNC: 7.3 MG/DL — LOW (ref 8–20)
CALCIUM SERPL-MCNC: 8.8 MG/DL — SIGNIFICANT CHANGE UP (ref 8.4–10.5)
CHLORIDE SERPL-SCNC: 97 MMOL/L — SIGNIFICANT CHANGE UP (ref 96–108)
CHOLEST SERPL-MCNC: 143 MG/DL — SIGNIFICANT CHANGE UP
CO2 SERPL-SCNC: 29 MMOL/L — SIGNIFICANT CHANGE UP (ref 22–29)
CREAT SERPL-MCNC: 0.52 MG/DL — SIGNIFICANT CHANGE UP (ref 0.5–1.3)
EGFR: 99 ML/MIN/1.73M2 — SIGNIFICANT CHANGE UP
GLUCOSE SERPL-MCNC: 77 MG/DL — SIGNIFICANT CHANGE UP (ref 70–99)
HCT VFR BLD CALC: 38.6 % — SIGNIFICANT CHANGE UP (ref 34.5–45)
HDLC SERPL-MCNC: 49 MG/DL — LOW
HGB BLD-MCNC: 12.5 G/DL — SIGNIFICANT CHANGE UP (ref 11.5–15.5)
LIPID PNL WITH DIRECT LDL SERPL: 71 MG/DL — SIGNIFICANT CHANGE UP
MCHC RBC-ENTMCNC: 29.3 PG — SIGNIFICANT CHANGE UP (ref 27–34)
MCHC RBC-ENTMCNC: 32.4 GM/DL — SIGNIFICANT CHANGE UP (ref 32–36)
MCV RBC AUTO: 90.6 FL — SIGNIFICANT CHANGE UP (ref 80–100)
NON HDL CHOLESTEROL: 94 MG/DL — SIGNIFICANT CHANGE UP
NT-PROBNP SERPL-SCNC: 2071 PG/ML — HIGH (ref 0–300)
PLATELET # BLD AUTO: 821 K/UL — HIGH (ref 150–400)
POTASSIUM SERPL-MCNC: 4.6 MMOL/L — SIGNIFICANT CHANGE UP (ref 3.5–5.3)
POTASSIUM SERPL-SCNC: 4.6 MMOL/L — SIGNIFICANT CHANGE UP (ref 3.5–5.3)
PROT SERPL-MCNC: 6.3 G/DL — LOW (ref 6.6–8.7)
RBC # BLD: 4.26 M/UL — SIGNIFICANT CHANGE UP (ref 3.8–5.2)
RBC # FLD: 12.7 % — SIGNIFICANT CHANGE UP (ref 10.3–14.5)
SODIUM SERPL-SCNC: 137 MMOL/L — SIGNIFICANT CHANGE UP (ref 135–145)
TRIGL SERPL-MCNC: 113 MG/DL — SIGNIFICANT CHANGE UP
TSH SERPL-MCNC: 1.35 UIU/ML — SIGNIFICANT CHANGE UP (ref 0.27–4.2)
WBC # BLD: 9.55 K/UL — SIGNIFICANT CHANGE UP (ref 3.8–10.5)
WBC # FLD AUTO: 9.55 K/UL — SIGNIFICANT CHANGE UP (ref 3.8–10.5)

## 2024-08-20 PROCEDURE — 71046 X-RAY EXAM CHEST 2 VIEWS: CPT | Mod: 26

## 2024-08-20 PROCEDURE — 99285 EMERGENCY DEPT VISIT HI MDM: CPT

## 2024-08-20 RX ORDER — ONDANSETRON 2 MG/ML
4 INJECTION, SOLUTION INTRAMUSCULAR; INTRAVENOUS EVERY 8 HOURS
Refills: 0 | Status: DISCONTINUED | OUTPATIENT
Start: 2024-08-20 | End: 2024-08-26

## 2024-08-20 RX ORDER — SPIRONOLACTONE 25 MG/1
12.5 TABLET, FILM COATED ORAL DAILY
Refills: 0 | Status: DISCONTINUED | OUTPATIENT
Start: 2024-08-20 | End: 2024-08-26

## 2024-08-20 RX ORDER — LOSARTAN POTASSIUM 50 MG/1
12.5 TABLET ORAL DAILY
Refills: 0 | Status: DISCONTINUED | OUTPATIENT
Start: 2024-08-21 | End: 2024-08-26

## 2024-08-20 RX ORDER — MAGNESIUM, ALUMINUM HYDROXIDE 200-225/5
30 SUSPENSION, ORAL (FINAL DOSE FORM) ORAL EVERY 4 HOURS
Refills: 0 | Status: DISCONTINUED | OUTPATIENT
Start: 2024-08-20 | End: 2024-08-26

## 2024-08-20 RX ORDER — ACETAMINOPHEN 325 MG/1
650 TABLET ORAL EVERY 6 HOURS
Refills: 0 | Status: DISCONTINUED | OUTPATIENT
Start: 2024-08-20 | End: 2024-08-26

## 2024-08-20 RX ORDER — CARVEDILOL 6.25 MG/1
3.12 TABLET ORAL EVERY 12 HOURS
Refills: 0 | Status: DISCONTINUED | OUTPATIENT
Start: 2024-08-20 | End: 2024-08-25

## 2024-08-20 RX ADMIN — Medication 1 PATCH: at 16:02

## 2024-08-20 RX ADMIN — Medication 2 MILLIGRAM(S): at 23:14

## 2024-08-20 RX ADMIN — SPIRONOLACTONE 12.5 MILLIGRAM(S): 25 TABLET, FILM COATED ORAL at 18:36

## 2024-08-20 RX ADMIN — CARVEDILOL 3.12 MILLIGRAM(S): 6.25 TABLET ORAL at 18:36

## 2024-08-20 RX ADMIN — Medication 3 MILLIGRAM(S): at 23:14

## 2024-08-20 NOTE — CHART NOTE - NSCHARTNOTEFT_GEN_A_CORE
Patient is known to gynecologic oncology service for diagnosis of peritoneal carcinomatosis. Plan is for neoadjuvant chemotherapy: Carbo AUC 5 / Taxol 175mg/m2 +/- Bevacizumab 15mg/kg D1 q21d x 3C followed by repeat imaging +/- interval debulk with HIPEC followed by at least 2-3C adjuvant treatment. Patient seen at bedside for social rounds. She is aware our team will be following her admission and are available for any questions/ concerns from her primary team as needed during her stay.      Gyn onc phone: [phone]

## 2024-08-20 NOTE — H&P ADULT - ASSESSMENT
69 F PMHx peritoneal carcinoma, current smoker sent to [facility] ED from cardiologist office for pericardial effusion and EF <25 on TTE. Admitted for pericardial effusion.     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 Negative for Progesterone receptor, CK20, p63, GATA3  - Multiple CT show adnexal masses and peritoneal carcinomatoses  - IR therapeutic paracentesis placed   - Palliative care consulted   - Consult oncology in AM     Sinus tachycardia w/ PVCs  - ECGs compared, 8/20 is similar to ECG on 8/5  - Monitor on Tele  - Maintain K >4, Mg >2, Phos >3    DVT: pneumatic compression, Rx held due to potential paracentesis in AM   Diet: NPO for procedure  Dispo: Prognosis guarded palliative care consulted      69 F PMHx peritoneal carcinoma, current smoker sent to [facility] ED from cardiologist office for pericardial effusion and EF <25 on TTE. Admitted for pericardial effusion.     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 Negative for Progesterone receptor, CK20, p63, GATA3  - Multiple CT show adnexal masses and peritoneal carcinomatoses  - Pain regimen placed, tylenol PRN pain 1-3, oxycodone/tylenol PRN pain 4-6, morphine 2 mg IV PRN pain 7-10  - IR therapeutic paracentesis order placed   - Palliative care consulted   - Consult oncology in AM     Sinus tachycardia w/ PVCs  - ECGs compared, 8/20 is similar to ECG on 8/5  - Monitor on Tele  - Maintain K >4, Mg >2, Phos >3    DVT: pneumatic compression, Rx held due to potential paracentesis in AM   Diet: NPO for procedure  Dispo: Prognosis guarded palliative care consulted      69 F PMHx peritoneal carcinoma, current smoker sent to [facility] ED from cardiologist office for pericardial effusion and EF <25 on TTE. Admitted for pericardial effusion.     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   - Painful abdominal distention on exam  - 8/9 paracentesis via IR w/ omental biopsy showing malignant ascites  - Multiple CT show adnexal masses and peritoneal carcinomatoses  - Pain regimen placed, tylenol PRN pain 1-3, oxycodone/tylenol PRN pain 4-6, morphine 2 mg IV PRN pain 7-10  - IR therapeutic paracentesis order placed   - Palliative care consulted   - Consult oncology in AM     Thrombocytosis  -Chronic, likely reactive from malignancy, monitor    Sinus tachycardia w/ PVCs  - ECGs compared, 8/20 is similar to ECG on 8/5  - Monitor on Tele  - Maintain K >4, Mg >2, Phos >3    DVT: pneumatic compression, Rx held due to potential paracentesis in AM   Diet: NPO for procedure  Dispo: Prognosis guarded palliative care consulted      69 F PMHx peritoneal carcinoma, current smoker sent to [facility] ED from cardiologist office for new cardiomyopathy with reported EF <25 on TTE, also w/ reported pericardial effusion     Plan:  New onset HFrEF w/ Pericardial effusion   -Unclear etiology for new cardiomyopathy  - Chest X-ray showed cardiomegaly and small left sided pleural effusion  - ECG showed ST w/ PVCs similar to previous ECG from 8/5  - TTE w/w/o con ordered   - C/w spironolactone, losartan, coreg as per cardiology  - Loop diuretics held due to pericardial effusion   - Cardiology following     - Telemetry     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   - Painful abdominal distention on exam  - 8/9 paracentesis via IR w/ omental biopsy showing malignant ascites  - Multiple CT show adnexal masses and peritoneal carcinomatoses  - Pain regimen placed, tylenol PRN pain 1-3, oxycodone/tylenol PRN pain 4-6, morphine 2 mg IV PRN pain 7-10  - IR therapeutic paracentesis order placed   - Palliative care consulted   - Consult oncology in AM     Thrombocytosis  -Chronic, likely reactive from malignancy, monitor    Sinus tachycardia w/ PVCs  - ECGs compared, 8/20 is similar to ECG on 8/5  - Monitor on Tele  - Maintain K >4, Mg >2, Phos >3    DVT: pneumatic compression, Rx held due to potential paracentesis in AM    Dispo: medically active, awaiting TTE, cardiology recommendations, possible paracentesis.    69 F PMHx peritoneal carcinoma, current smoker sent to [facility] ED from cardiologist office for new cardiomyopathy with reported EF <25 on TTE, also w/ reported pericardial effusion     Plan:  New onset HFrEF w/ Pericardial effusion   -Unclear etiology for new cardiomyopathy  - Chest X-ray showed cardiomegaly and small left sided pleural effusion  - ECG showed ST w/ PVCs similar to previous ECG from 8/5  - TTE w/w/o con ordered   - C/w spironolactone, losartan, coreg as per cardiology  - Loop diuretics held due to pericardial effusion   - Cardiology following     - Telemetry     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   - Painful abdominal distention on exam  - 8/9 paracentesis via IR w/ omental biopsy showing malignant ascites  - Multiple CT show adnexal masses and peritoneal carcinomatoses  - Pain regimen placed, tylenol PRN pain 1-3, oxycodone/tylenol PRN pain 4-6, morphine 2 mg IV PRN pain 7-10  - IR therapeutic paracentesis order placed   - Palliative care consulted   - Consult oncology in AM     Thrombocytosis  -Chronic, likely reactive from malignancy, monitor    Sinus tachycardia w/ PVCs  - ECGs compared, 8/20 is similar to ECG on 8/5  - Monitor on Tele  - Maintain K >4, Mg >2, Phos >3    Active smoker  -Nicotine patch    DVT: pneumatic compression, Rx held due to potential paracentesis in AM    Dispo: medically active, awaiting TTE, cardiology recommendations, possible paracentesis.

## 2024-08-20 NOTE — ED PROVIDER NOTE - CLINICAL SUMMARY MEDICAL DECISION MAKING FREE TEXT BOX
70 y/o F with a PMHx of peritoneal carcinoma was sent in by Cardiology office for findings concerning on Echo. Follows with Dr. Dinh, had an echo yesterday that demonstrated EF of 25%, b/l pleural effusion, and trace pericardial effusion, sent in for evaluation for new cardiomyopathy. 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 States has been having trouble laying flat due to continued abdominal distention. Endorses headache. Denies SOB, diarrhea, urinary symptoms, chest pain, falls, LOB, syncope, vision changes, recent travel or sick contact. PE: NAD, S1/S2, decreased breath sound R>L, Non tender/+Distended, FROMx4, A&Ox3. Cardiology consulted, will obtain labs.

## 2024-08-20 NOTE — CONSULT NOTE ADULT - SUBJECTIVE AND OBJECTIVE BOX
[facility]                                              CARDIOLOGY AT [facility]                                                   [address], [place] [address]                                             Telephone: [phone]. Fax:[phone]                                                         CARDIOLOGY CONSULTATION NOTE                                                                                             Consult requested by:  Dr. Morrow  History obtained by: Patient and medical record  Community Cardiologist:  DR Dinh   obtained: Yes [  ] No [ x ]  Reason for Consultation:   Pleural effusions  Avialable out pt records reviewed: Yes [  ] No [  ]      70 y/o female active tobacco user with with new dx of b/l adnexal masses and extensive peritoneal carcinomatosis c/f at least Stage BARBARA ovarian vs. primary peritoneal  who was referred from office with mild sob found to have a new cardiomyopathy and bilateral effusion  patient denies any chest pain  Denies previous cardiac hx      CARDIAC TESTING   ECHO:  preformed in office 8-19-24 EF 25%  mild mr small pericardial effusions bilateral pleural effusions     PAST MEDICAL HISTORY  Peritoneal carcinomatosis suspect for ovarian cancer      PAST SURGICAL HISTORY  None    SOCIAL HISTORY:    CIGARETTES:   YES  ALCOHOL:  Social  DRUGS:  None      FAMILY HISTORY:  FAMILY HISTORY:  FH: pancreatic cancer (Sibling)  FHx: lung cancer (Sibling)  FHx: breast cancer (Mother)    Family History of Cardiovascular Disease:  Yes [  ] No [  ]  Coronary Artery Disease in first degree relative: Yes [  ] No [  ]  Sudden Cardiac Death in First degree relative: Yes [  ] No [  ]      HOME MEDICATIONS:  NONE    ALLERGIES: NKDA    REVIEW OF SYMPTOMS:   CONSTITUTIONAL: No fever, no chills, no weight loss, no weight gain, + fatigue   ENMT:  No vertigo; No sinus or throat pain  NECK: No pain or stiffness  CARDIOVASCULAR: No chest pain, + dyspnea, no syncope/presyncope, no palpitations, no dizziness, no Orthopnea, no Paroxsymal nocturnal dyspnea  RESPIRATORY: no Shortness of breath, no cough, no wheezing  : No dysuria, no hematuria   GI: No dark color stool, no nausea, no diarrhea, no constipation, no abdominal pain Abdomen with increase distension  NEURO: No headache, no slurred speech   MUSCULOSKELETAL: No joint pain or swelling; No muscle, back, or extremity pain  PSYCH: No agitation, no anxiety.    ALL OTHER REVIEW OF SYSTEMS ARE NEGATIVE.      Vital Signs Last 24 Hrs  T(C): 36.4 (20 Aug 2024 11:26), Max: 36.4 (20 Aug 2024 11:26)  T(F): 97.5 (20 Aug 2024 11:26), Max: 97.5 (20 Aug 2024 11:26)  HR: 115 (20 Aug 2024 11:26) (115 - 115)  BP: 117/76 (20 Aug 2024 11:26) (117/76 - 117/76)  BP(mean): --  RR: 18 (20 Aug 2024 11:26) (18 - 18)  SpO2: 97% (20 Aug 2024 11:26) (97% - 97%)    Parameters below as of 20 Aug 2024 11:26  Patient On (Oxygen Delivery Method): room air      INTAKE AND OUTPUT:     PHYSICAL EXAM:  Constitutional: Thin appearing female in nad  HEENT: Atraumatic and normocephalic , neck is supple . no JVD. No carotid bruit.  CNS: A&Ox3. No focal deficits.   Respiratory: CTAB, unlabored   Cardiovascular: RRR normal s1 s2. No murmur. No rubs or gallop.  Gastrointestinal: Soft, non-tender. +Bowel sounds. + ascites    MSK: full ROM extremities x 4  Extremities: No edema. No cyanosis   Psychiatric: Calm . no agitation.   Skin: Warm and dry, no ulcers on extremities       LABS:            ;p-BNP=          INTERPRETATION OF TELEMETRY:     ECG:   Prior ECG: Yes [  ] No [  ]      RADIOLOGY & ADDITIONAL STUDIES:    X-ray:  reviewed by me.   CT scan:   MRI:   US:                                                [facility]                                              CARDIOLOGY AT [facility]                                                   [address], [place] [address]                                             Telephone: [phone]. Fax:[phone]                                                         CARDIOLOGY CONSULTATION NOTE                                                                                             Consult requested by:  Dr. Morrow  History obtained by: Patient and medical record  Community Cardiologist:  DR Dinh   obtained: Yes [  ] No [ x ]  Reason for Consultation:   Pleural effusions  Avialable out pt records reviewed: Yes [  ] No [  ]      68 y/o female active tobacco user with with new dx of b/l adnexal masses and extensive peritoneal carcinomatosis c/f at least Stage BARBARA ovarian vs. primary peritoneal  who was referred from office with mild sob found to have a new cardiomyopathy and bilateral effusion  patient denies any chest pain  Denies previous cardiac hx      CARDIAC TESTING   ECHO:  preformed in office 8-19-24 EF 25%  mild mr small pericardial effusions bilateral pleural effusions     PAST MEDICAL HISTORY  Peritoneal carcinomatosis suspect for ovarian cancer      PAST SURGICAL HISTORY  None    SOCIAL HISTORY:    CIGARETTES:   YES  ALCOHOL:  Social  DRUGS:  None      FAMILY HISTORY:  FAMILY HISTORY:  FH: pancreatic cancer (Sibling)  FHx: lung cancer (Sibling)  FHx: breast cancer (Mother)    Family History of Cardiovascular Disease:  Yes [  ] No [  ]  Coronary Artery Disease in first degree relative: Yes [  ] No [  ]  Sudden Cardiac Death in First degree relative: Yes [  ] No [  ]      HOME MEDICATIONS:  NONE    ALLERGIES: NKDA    REVIEW OF SYMPTOMS:   CONSTITUTIONAL: No fever, no chills, no weight loss, no weight gain, + fatigue   ENMT:  No vertigo; No sinus or throat pain  NECK: No pain or stiffness  CARDIOVASCULAR: No chest pain, + dyspnea, no syncope/presyncope, no palpitations, no dizziness, no Orthopnea, no Paroxsymal nocturnal dyspnea  RESPIRATORY: no Shortness of breath, no cough, no wheezing  : No dysuria, no hematuria   GI: No dark color stool, no nausea, no diarrhea, no constipation, no abdominal pain Abdomen with increase distension  NEURO: No headache, no slurred speech   MUSCULOSKELETAL: No joint pain or swelling; No muscle, back, or extremity pain  PSYCH: No agitation, no anxiety.    ALL OTHER REVIEW OF SYSTEMS ARE NEGATIVE.      Vital Signs Last 24 Hrs  T(C): 36.4 (20 Aug 2024 11:26), Max: 36.4 (20 Aug 2024 11:26)  T(F): 97.5 (20 Aug 2024 11:26), Max: 97.5 (20 Aug 2024 11:26)  HR: 115 (20 Aug 2024 11:26) (115 - 115)  BP: 117/76 (20 Aug 2024 11:26) (117/76 - 117/76)  BP(mean): --  RR: 18 (20 Aug 2024 11:26) (18 - 18)  SpO2: 97% (20 Aug 2024 11:26) (97% - 97%)    Parameters below as of 20 Aug 2024 11:26  Patient On (Oxygen Delivery Method): room air      INTAKE AND OUTPUT:     PHYSICAL EXAM:  Constitutional: Thin appearing female in nad  HEENT: Atraumatic and normocephalic , neck is supple . no JVD. No carotid bruit.  CNS: A&Ox3. No focal deficits.   Respiratory: CTAB, unlabored   Cardiovascular: RRR normal s1 s2. No murmur. No rubs or gallop.  Gastrointestinal: Soft, non-tender. +Bowel sounds. + ascites    MSK: full ROM extremities x 4  Extremities: No edema. No cyanosis   Psychiatric: Calm . no agitation.   Skin: Warm and dry, no ulcers on extremities       LABS:            ;p-BNP=          INTERPRETATION OF TELEMETRY: Sinus tach    ECG: Sius tach a 112  prwp v1-v4 t wave inversion v5-6  Prior ECG: Yes [  ] No [  ]      RADIOLOGY & ADDITIONAL STUDIES:    X-ray:  reviewed by me.   CT scan:   MRI:   US:                                                [facility]                                              CARDIOLOGY AT [facility]                                                   [address], [place] [address]                                             Telephone: [phone]. Fax:[phone]                                                         CARDIOLOGY CONSULTATION NOTE                                                                                             Consult requested by:  Dr. Morrow  History obtained by: Patient and medical record  Community Cardiologist:  DR Dinh   obtained: Yes [  ] No [ x ]  Reason for Consultation:   Pleural effusions  Avialable out pt records reviewed: Yes [  ] No [  ]      68 y/o female active tobacco user with with new dx of b/l adnexal masses and extensive peritoneal carcinomatosis c/f at least Stage BARBARA ovarian vs. primary peritoneal  who was referred from office with mild sob found to have a new cardiomyopathy and bilateral effusion  patient denies any chest pain  Denies previous cardiac hx      CARDIAC TESTING   ECHO:  preformed in office 8-19-24 EF 25%  mild mr small pericardial effusions bilateral pleural effusions     PAST MEDICAL HISTORY  Peritoneal carcinomatosis suspect for ovarian cancer      PAST SURGICAL HISTORY  None    SOCIAL HISTORY:    CIGARETTES:   YES  ALCOHOL:  Social  DRUGS:  None      FAMILY HISTORY:  FAMILY HISTORY:  FH: pancreatic cancer (Sibling)  FHx: lung cancer (Sibling)  FHx: breast cancer (Mother)    Family History of Cardiovascular Disease:  Yes [  ] No [  ]  Coronary Artery Disease in first degree relative: Yes [  ] No [  ]  Sudden Cardiac Death in First degree relative: Yes [  ] No [  ]      HOME MEDICATIONS:  NONE    ALLERGIES: NKDA    REVIEW OF SYMPTOMS:   CONSTITUTIONAL: No fever, no chills, no weight loss, no weight gain, + fatigue   HENMT:  No vertigo; No sinus or throat pain  NECK: No pain or stiffness  CARDIOVASCULAR: No chest pain, No dyspnea, no syncope/presyncope, no palpitations, no dizziness, no Orthopnea, no Paroxsymal nocturnal dyspnea  RESPIRATORY: no Shortness of breath, no cough, no wheezing  : No dysuria, no hematuria   GI: No dark color stool, no nausea, no diarrhea, no constipation, no abdominal pain Abdomen with increase distension  NEURO: No headache, no slurred speech   MUSCULOSKELETAL: No joint pain or swelling; No muscle, back, or extremity pain  PSYCH: No agitation, no anxiety.    ALL OTHER REVIEW OF SYSTEMS ARE NEGATIVE.      Vital Signs Last 24 Hrs  T(C): 36.4 (20 Aug 2024 11:26), Max: 36.4 (20 Aug 2024 11:26)  T(F): 97.5 (20 Aug 2024 11:26), Max: 97.5 (20 Aug 2024 11:26)  HR: 115 (20 Aug 2024 11:26) (115 - 115)  BP: 117/76 (20 Aug 2024 11:26) (117/76 - 117/76)  BP(mean): --  RR: 18 (20 Aug 2024 11:26) (18 - 18)  SpO2: 97% (20 Aug 2024 11:26) (97% - 97%)    Parameters below as of 20 Aug 2024 11:26  Patient On (Oxygen Delivery Method): room air      INTAKE AND OUTPUT:     PHYSICAL EXAM:  Constitutional: Thin appearing female in NAD  HEENT: Atraumatic and normocephalic , neck is supple . no JVD. No carotid bruit.  CNS: A&Ox3. No focal deficits.   Respiratory: Decreased breath sounds  Cardiovascular: RRR normal s1 s2. No murmur. No rubs or gallop.  Gastrointestinal: Soft, non-tender. +Bowel sounds. + ascites    MSK: full ROM extremities x 4  Extremities: No edema. No cyanosis   Psychiatric: Calm . no agitation.   Skin: Warm and dry, no ulcers on extremities     LABS:      INTERPRETATION OF TELEMETRY: Sinus tach    ECG: Sius tach a 112  PRWP v1-v4 t wave inversion v5-6  Prior ECG: Yes [  ] No [  ]    RADIOLOGY & ADDITIONAL STUDIES:    X-ray:  reviewed by me.                                                   [facility]                                              CARDIOLOGY AT [facility]                                                   [address], [place] [address]                                             Telephone: [phone]. Fax:[phone]                                                         CARDIOLOGY CONSULTATION NOTE                                                                                             Consult requested by:  Dr. Morrow  History obtained by: Patient and medical record  Community Cardiologist:  DR Dinh   obtained: Yes [  ] No [ x ]  Reason for Consultation:   Pleural effusions  Avialable out pt records reviewed: Yes [  ] No [  ]      70 y/o female active tobacco user with with new dx of b/l adnexal masses and extensive peritoneal carcinomatosis c/f at least Stage BARBARA ovarian vs. primary peritoneal  who was referred from office with found to have a new cardiomyopathy and bilateral effusion  patient denies any chest pain  Denies previous cardiac hx      CARDIAC TESTING   ECHO:  preformed in office 8-19-24 EF 25%  mild mr small pericardial effusions bilateral pleural effusions     PAST MEDICAL HISTORY  Peritoneal carcinomatosis suspect for ovarian cancer      PAST SURGICAL HISTORY  None    SOCIAL HISTORY:    CIGARETTES:   YES  ALCOHOL:  Social  DRUGS:  None      FAMILY HISTORY:  FAMILY HISTORY:  FH: pancreatic cancer (Sibling)  FHx: lung cancer (Sibling)  FHx: breast cancer (Mother)    Family History of Cardiovascular Disease:  Yes [  ] No [  ]  Coronary Artery Disease in first degree relative: Yes [  ] No [  ]  Sudden Cardiac Death in First degree relative: Yes [  ] No [  ]      HOME MEDICATIONS:  NONE    ALLERGIES: NKDA    REVIEW OF SYMPTOMS:   CONSTITUTIONAL: No fever, no chills, no weight loss, no weight gain, + fatigue   HENMT:  No vertigo; No sinus or throat pain  NECK: No pain or stiffness  CARDIOVASCULAR: No chest pain, No dyspnea, no syncope/presyncope, no palpitations, no dizziness, no Orthopnea, no Paroxsymal nocturnal dyspnea  RESPIRATORY: no Shortness of breath, no cough, no wheezing  : No dysuria, no hematuria   GI: No dark color stool, no nausea, no diarrhea, no constipation, no abdominal pain Abdomen with increase distension  NEURO: No headache, no slurred speech   MUSCULOSKELETAL: No joint pain or swelling; No muscle, back, or extremity pain  PSYCH: No agitation, no anxiety.    ALL OTHER REVIEW OF SYSTEMS ARE NEGATIVE.      Vital Signs Last 24 Hrs  T(C): 36.4 (20 Aug 2024 11:26), Max: 36.4 (20 Aug 2024 11:26)  T(F): 97.5 (20 Aug 2024 11:26), Max: 97.5 (20 Aug 2024 11:26)  HR: 115 (20 Aug 2024 11:26) (115 - 115)  BP: 117/76 (20 Aug 2024 11:26) (117/76 - 117/76)  BP(mean): --  RR: 18 (20 Aug 2024 11:26) (18 - 18)  SpO2: 97% (20 Aug 2024 11:26) (97% - 97%)    Parameters below as of 20 Aug 2024 11:26  Patient On (Oxygen Delivery Method): room air      INTAKE AND OUTPUT:     PHYSICAL EXAM:  Constitutional: Thin appearing female in NAD  HEENT: Atraumatic and normocephalic , neck is supple . no JVD. No carotid bruit.  CNS: A&Ox3. No focal deficits.   Respiratory: Decreased breath sounds  Cardiovascular: RRR normal s1 s2. No murmur. No rubs or gallop.  Gastrointestinal: Soft, non-tender. +Bowel sounds. + ascites    MSK: full ROM extremities x 4  Extremities: No edema. No cyanosis   Psychiatric: Calm . no agitation.   Skin: Warm and dry, no ulcers on extremities     LABS:      INTERPRETATION OF TELEMETRY: Sinus tach    ECG: Sius tach a 112  PRWP v1-v4 t wave inversion v5-6  Prior ECG: Yes [  ] No [  ]    RADIOLOGY & ADDITIONAL STUDIES:    X-ray:  reviewed by me.

## 2024-08-20 NOTE — ED ADULT TRIAGE NOTE - CHIEF COMPLAINT QUOTE
pt was sent, by MD after echo was done yesterday showing fluids around heart and lungs , denies any SOB at this time, diagnosed with  peritoneal CA, no chemo,

## 2024-08-20 NOTE — CONSULT NOTE ADULT - PROBLEM SELECTOR RECOMMENDATION 9
+ ascites get abd sono gram and consider paracentesis  Heme follow up + ascites get abd Sono gram and consider paracentesis  Please call oncology to see patient re  smoking cessation encouraged + ascites get abd Sono gram and consider paracentesis  Please call oncology to see patient re  smoking cessation encouraged  Pleural effusion consider thoracentesis

## 2024-08-20 NOTE — H&P ADULT - NSHPPHYSICALEXAM_GEN_ALL_CORE
VITALS:   T(C): 36.7 (08-20-24 @ 22:13), Max: 36.8 (08-20-24 @ 15:21)  HR: 92 (08-20-24 @ 22:13) (88 - 115)  BP: 104/59 (08-20-24 @ 22:13) (104/59 - 137/104)  RR: 19 (08-20-24 @ 22:13) (18 - 20)  SpO2: 93% (08-20-24 @ 22:13) (93% - 100%)    GENERAL: NAD, lying in bed comfortably  HEAD:  Atraumatic, normocephalic  EYES: EOMI, PERRLA, conjunctiva and sclera clear  ENT: Moist mucous membranes  NECK: Supple, no JVD  HEART: Regular rate and rhythm, no murmurs, rubs, or gallops  LUNGS: Unlabored respirations.  Clear to auscultation bilaterally, no crackles, wheezing, or rhonchi  ABDOMEN: Soft, nontender, distended/gravid, +BS  EXTREMITIES: 2+ peripheral pulses bilaterally. No clubbing, cyanosis, or edema  NERVOUS SYSTEM:  A&Ox3, no focal deficits   SKIN: No rashes or lesions VITALS:   T(C): 36.7 (08-20-24 @ 22:13), Max: 36.8 (08-20-24 @ 15:21)  HR: 92 (08-20-24 @ 22:13) (88 - 115)  BP: 104/59 (08-20-24 @ 22:13) (104/59 - 137/104)  RR: 19 (08-20-24 @ 22:13) (18 - 20)  SpO2: 93% (08-20-24 @ 22:13) (93% - 100%)    GENERAL: NAD, lying in bed comfortably  HEAD:  Atraumatic, normocephalic  EYES: EOMI, PERRLA, conjunctiva and sclera clear  ENT: Moist mucous membranes  NECK: Supple, no JVD  HEART: Regular rate and rhythm, no murmurs, rubs, or gallops  LUNGS: Unlabored respirations.  Clear to auscultation bilaterally, no crackles, wheezing, or rhonchi  ABDOMEN: Soft, diffuse tenderness, distended/gravid, +BS  EXTREMITIES: 2+ peripheral pulses bilaterally. No clubbing, cyanosis, or edema  NERVOUS SYSTEM:  A&Ox3, no focal deficits   SKIN: No rashes or lesions

## 2024-08-20 NOTE — ED ADULT NURSE REASSESSMENT NOTE - NS ED NURSE REASSESS COMMENT FT1
Pt A+Ox4, respirations equal and unlabored on room air. Repeat blood work sent at this time. Pt denies SOB ot CP. Pt connected to  and cardiac monitor. Pt left in position of comfort, wheels of stretcher locked and in the lowest position. Call bell within reach.

## 2024-08-20 NOTE — H&P ADULT - CONVERSATION DETAILS
Patient understands the extent of her cancer, but does not know if she wants to be DNR/DNI/comfort yet. Patient wants to discuss with her family in AM. Patient wants to remain full code at this time. Patient understands the extent of her cancer, but does not know if she wants to be DNR/DNI/comfort yet. Patient wants to discuss with her family in AM. Patient wants to remain full code at this time. Palliative care consult placed. Patient understands the extent of her cancer, but patient wants to discuss furhter with her family in AM and is amenable to all resuscitative measures until then. Patient wants to remain full code at this time. Palliative care consult placed.

## 2024-08-20 NOTE — PATIENT PROFILE ADULT - FUNCTIONAL ASSESSMENT - DAILY ACTIVITY 3.
Detail Level: Zone
Photo Preface (Leave Blank If You Do Not Want): Photographs were obtained today
4 = No assist / stand by assistance

## 2024-08-20 NOTE — H&P ADULT - HISTORY OF PRESENT ILLNESS
69 F PMHx peritoneal carcinoma, current smoker sent to [facility] ED from cardiologist office for pericardial effusion and EF <25 on TTE.     In ED patient was tachycardic,  69 F PMHx peritoneal carcinoma, current smoker sent to [facility] ED from cardiologist office for pericardial effusion and EF <25 on TTE. Patient was at [facility] ED in June for abdominal distension and diarrhea, was found to have bilateral adnexal masses with moderate ascites and extensive peritoneal carcinomatosis. Patient was referred to GYN ONC for concern for malignancy of gynecologic origin. At GYN patient had repeat CT that showed bilateral adnexal masses 3.0 x 2.0 cm on the right and 5.7 x 1.0 cm on the left, unremarkable uterus, moderate ascites, and extensive peritoneal carcinomatosis. 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  Patient has not started chemotherapy or radiation therapy. Went to cardiology office for pre-procedural clearance. TTE was performed and found pericardial effusion, pleural effusion, and EF <25%. Patient endorses poor appetite, SOB, orthopnea, abdominal distention, constipation. Denies fever, chills, N/V/D, hematochezia, hematuria. In ED patient was tachycardic. Cardiology was consulted for pericardial effusion.      findings concerning on Echo. Follows with Dr. Dinh, had an echo yesterday that demonstrated EF of 25%, b/l pleural effusion, and trace pericardial effusion, sent in for evaluation for new cardiomyopathy. 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 States has been having trouble laying flat due to continued abdominal distention. Endorses headache. Denies SOB, diarrhea, urinary symptoms, chest pain, falls, LOB, syncope, vision changes, recent travel or sick contact. PE: NAD, S1/S2, decreased breath sound R>L, Non tender/+Distended, FROMx4, A&Ox3. Card    primary.  See note.      Note: Immunohistochemical stains:  Positive: mutation type p53, p16, PAX8, ER, Ki-67 proliferative marker  approximately 80%  Negative: Progesterone receptor, CK20, p63, GATA3    Upper abdominal mass 7.1 x 4.8 cm. Right mid abdominal mass 5.3 x 4.1 cm. Lower abdominal mass 6.2 x 5.3 cm. Wall thickening of the sigmoid colon, which may reflect the presence of serosal implants. Nodularity in the cul-de-sac and along the paracolic gutters, as well as along the bladder dome. No lymphadenopathy.       In ED patient was tachycardic,  69 F PMHx peritoneal carcinoma, current smoker sent to [facility] ED from cardiologist office for pericardial effusion and EF <25 on TTE. Patient was at [facility] ED in June for abdominal distension and diarrhea, was found to have bilateral adnexal masses with moderate ascites and extensive peritoneal carcinomatosis. Patient was referred to GYN ONC for concern for malignancy of gynecologic origin. At GYN patient had repeat CT that showed bilateral adnexal masses 3.0 x 2.0 cm on the right and 5.7 x 1.0 cm on the left, unremarkable uterus, moderate ascites, and extensive peritoneal carcinomatosis. 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  Patient has not started chemotherapy or radiation therapy. Went to cardiology office for pre-procedural clearance. TTE was performed and found pericardial effusion, pleural effusion, and EF <25%. Patient endorses poor appetite, SOB, orthopnea, abdominal distention, constipation. Denies fever, chills, N/V/D, hematochezia, hematuria. In ED patient was tachycardic. Cardiology was consulted for pericardial effusion.      69 F PMHx peritoneal carcinoma, current smoker sent to [facility] ED from cardiologist office for pericardial effusion and EF <25 on TTE. Patient was at [facility] ED in June for abdominal distension and diarrhea, was found to have bilateral adnexal masses with moderate ascites and extensive peritoneal carcinomatosis. Patient was referred to GYN ONC for concern for malignancy of gynecologic origin. At GYN patient had repeat CT that showed bilateral adnexal masses 3.0 x 2.0 cm on the right and 5.7 x 1.0 cm on the left, unremarkable uterus, moderate ascites, and extensive peritoneal carcinomatosis. 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  Patient has not started chemotherapy or radiation therapy. Went to cardiology office for ?evaluation prior to starting cancer treatment. TTE was performed and found pericardial effusion, pleural effusion, and EF <25%. Patient endorses poor appetite, SOB, orthopnea, abdominal distention, constipation. Denies fever, chills, N/V/D, hematochezia, hematuria. In ED patient was tachycardic. Cardiology was consulted for pericardial effusion.      69 F PMHx peritoneal carcinoma, current smoker sent to [facility] ED from cardiologist office for pericardial effusion and EF <25 on TTE. Patient was at [facility] ED in June for abdominal distension and diarrhea, was found to have bilateral adnexal masses with moderate ascites and extensive peritoneal carcinomatosis. Patient was referred to GYN ONC for concern for malignancy of gynecologic origin. At GYN patient had repeat CT that showed bilateral adnexal masses 3.0 x 2.0 cm on the right and 5.7 x 1.0 cm on the left, unremarkable uterus, moderate ascites, and extensive peritoneal carcinomatosis. 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  Patient has not started chemotherapy or radiation therapy. Went to cardiology office for ?evaluation prior to starting cancer treatment. TTE was performed as outpatient and reportedly found pericardial effusion, pleural effusion, and EF <25%. Patient endorses poor appetite, SOB, orthopnea, abdominal distention, constipation. Denies fever, chills, N/V/D, hematochezia, hematuria.

## 2024-08-20 NOTE — CONSULT NOTE ADULT - ASSESSMENT
68 y/o female active tobacco user with with new dx of b/l adnexal masses and extensive peritoneal carcinomatosis c/f at least Stage BARBARA ovarian vs. primary peritoneal  who was referred from office with mild sob found to have a new cardiomyopathy and bilateral effusion.  patient denies any chest pain  Denies previous cardiac hx

## 2024-08-20 NOTE — H&P ADULT - ATTENDING COMMENTS
69yoF with above PMHx sent to hospital for new cardiomyopathy seen on outpatient TTE w/ reported EF <25%.  Pt seen by cardiology who wants to trial some GDMT as above w/out loop diuretic given pericardial effusion noted on TTE.  In hospital TTE ordered and pending.  Small L-pleural effusion noted on CXR, will hold off on CT surgery consult as appear small but can call if official CXR report notes moderate in size. 69yoF with above PMHx sent to hospital for new cardiomyopathy seen on outpatient TTE w/ reported EF <25%.  Pt seen by cardiology who wants to trial some GDMT as above w/out loop diuretic given pericardial effusion noted on TTE.  In hospital TTE ordered and pending.  IR consult for therapeutic paracentesis given distention noted on exam.  Per outpatient records, received prior paracentesis on 8/9 w/ 2L removed.  Small L-pleural effusion noted on CXR, will hold off on CT surgery consult as appear small but can call if official CXR report notes moderate in size. 69yoF with above PMHx sent to hospital for new cardiomyopathy seen on outpatient TTE w/ reported EF <25%.  Pt seen by cardiology who wants to trial some GDMT as above w/out loop diuretic given pericardial effusion noted on TTE.  Unclear etiology of cardiomyopathy as pt has not yet started cancer treatment but pt is active smoker so has CAD risk.   Repeat TTE ordered and pending.  IR consult for therapeutic paracentesis given distention noted on exam.  Per outpatient records, received prior paracentesis on 8/9 w/ 2L removed.  Small L-pleural effusion noted on CXR, will hold off on CT surgery consult as appear small but can call if official CXR report notes moderate in size.

## 2024-08-20 NOTE — CONSULT NOTE ADULT - NS ATTEND AMEND GEN_ALL_CORE FT
Patient seen and examined by me.    T(C): 36.4 (08-20-24 @ 11:26), Max: 36.4 (08-20-24 @ 11:26)  HR: 115 (08-20-24 @ 11:26) (115 - 115)  BP: 117/76 (08-20-24 @ 11:26) (117/76 - 117/76)  RR: 18 (08-20-24 @ 11:26) (18 - 18)  SpO2: 97% (08-20-24 @ 11:26) (97% - 97%)  Patient alert and awake.  Chest- Bilateral Clear BS  Cardiac- S1 and S2  Abdomen- Soft    Patient seen with Connie Hopkins NP    Assessment/Plan:  1. Acute HFrEF  2. Other problems as noted    I have discussed my recommendation with the PA which are outlined above.  Will follow.

## 2024-08-20 NOTE — ED PROVIDER NOTE - PHYSICAL EXAMINATION
· CONSTITUTIONAL: In no apparent distress.  · HEENMT: Airway patent, TM normal bilaterally, normal appearing mouth, nose, throat, neck supple with full range of motion, no cervical adenopathy.  · EYES: Pupils equal, round and reactive to light, Extra-ocular movement intact, eyes are clear b/l  · CARDIAC: Regular rate and rhythm, Heart sounds S1 S2 present, no murmurs, rubs or gallops. Trace edema b/l LE L>R  · RESPIRATORY: Decreased breath sounds L>R.   · GASTROINTESTINAL: Abdomen soft, non tender, distended, no rebound, no guarding .  · MUSCULOSKELETAL: Spine appears normal, movement of extremities grossly intact.  · NEUROLOGICAL: Alert and interactive, no focal deficits.  · SKIN: No cyanosis, no pallor, no jaundice, no rash  · PSYCHIATRIC: Alert and oriented to person, place and time. Normal mood and affect, no apparent risk to self or others  · HEME LYMPH: No pallor, No splenomegaly

## 2024-08-20 NOTE — CONSULT NOTE ADULT - PROBLEM SELECTOR RECOMMENDATION 2
Low na diet  BNP  New drop in EF  + smoker  needs ischemic work up Low na diet  BNP  New drop in EF  + smoker    Will treat medically for cardiomyopathy and speak to oncology regarding prognosis etc  Hold loop diuretics  spirolactone 12.5mg qd  Losartan 12.5mg qd  Coreg 1.25 mg bid Low na diet  BNP  New drop in EF  + smoker    Will treat medically for cardiomyopathy and speak to oncology regarding prognosis etc  Hold loop diuretics  spirolactone 12.5mg qd  Losartan 12.5mg qd  Coreg 3.125 mg bid

## 2024-08-20 NOTE — ED PROVIDER NOTE - PROGRESS NOTE DETAILS
Sathish CHAUDHARI: Pt is a patient of Dr. Martinez, gyn onc, contacted gyn onc team about her presence. To come an assess.

## 2024-08-20 NOTE — ED PROVIDER NOTE - ATTENDING CONTRIBUTION TO CARE
I personally saw the patient with the resident, and completed the key components of the history and physical exam. I then discussed the management plan with the resident.    70 y/o F with PMHx of peritoneal carcinoma presents from cardiology office for pericardial effusion and new heart failure with EF of 25%. She is due to start chemotherapy soon. She denies orthopnea.     NAD, well appearing, abdominal distension, tachycardic, mildly tachypneic, no respiratory distress, satting well on room air, ambulating around the ED.    labs, cardiology consulted, CXR shows minimal, trace pleural effusions, GYNONC evaluated patient, recommends medical admission.

## 2024-08-20 NOTE — ED ADULT NURSE NOTE - NSFALLUNIVINTERV_ED_ALL_ED
Bed/Stretcher in lowest position, wheels locked, appropriate side rails in place/Call bell, personal items and telephone in reach/Instruct patient to call for assistance before getting out of bed/chair/stretcher/Non-slip footwear applied when patient is off stretcher/[place] to call system/Physically safe environment - no spills, clutter or unnecessary equipment/Purposeful proactive rounding/Room/bathroom lighting operational, light cord in reach

## 2024-08-20 NOTE — ED ADULT NURSE NOTE - NSICDXPASTMEDICALHX_GEN_ALL_CORE_FT
PAST MEDICAL HISTORY:  Abdominal mass     Abdominal pain     Adnexal mass     Peritoneal carcinoma     Vaginal delivery

## 2024-08-20 NOTE — PATIENT PROFILE ADULT - FALL HARM RISK - RISK INTERVENTIONS
Assistance OOB with selected safe patient handling equipment/Assistance with ambulation/Monitor for mental status changes/Reorient to person, place and time as needed/Sit up slowly, dangle for a short time, stand at bedside before walking/Bed in lowest position, wheels locked, appropriate side rails in place/Call bell, personal items and telephone in reach/Instruct patient to call for assistance before getting out of bed or chair/Non-slip footwear when patient is out of bed/[place] to call system/Physically safe environment - no spills, clutter or unnecessary equipment/Room/bathroom lighting operational, light cord in reach

## 2024-08-20 NOTE — ED PROVIDER NOTE - OBJECTIVE STATEMENT
68 y/o F with a PMHx of peritoneal carcinoma was sent in by Cardiology office for findings concerning on Echo. Follows with Dr. Dinh, had an echo yesterday that demonstrated EF of 25%, b/l pleural effusion, and trace pericardial effusion, sent in for evaluation for new cardiomyopathy. 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 States has been having trouble laying flat due to continued abdominal distention. Endorses headache. Denies SOB, diarrhea, urinary symptoms, chest pain, falls, LOB, syncope, vision changes, recent travel or sick contact. 70 y/o F with a PMHx of peritoneal carcinoma was sent in by Cardiology office for findings concerning on Echo. Follows with Dr. Dinh, had an echo yesterday that demonstrated EF of 25%, b/l pleural effusion, and trace pericardial effusion, sent in for evaluation for new cardiomyopathy. Recently diagnosed with peritoneal carcinomatosis (Stage IV ovarian vs primary peritoneal). States has been having trouble laying flat due to continued abdominal distention. Endorses headache. Denies SOB, diarrhea, urinary symptoms, chest pain, falls, LOB, syncope, vision changes, recent travel or sick contact.

## 2024-08-20 NOTE — ED ADULT NURSE NOTE - OBJECTIVE STATEMENT
covering RN: assumed care of pt at 1210. pt sent in by cardiologist for " fluid in the lungs". pt recently diagnosed with peritoneal CA, has not started treatment yet. c/o new onset of sob this morning. denies chest pain or dizziness. pt reports constant abd pain. rr even and unlabored. placed on cardiac monitor and . anox4. pt educated on plan of care, pt able to successfully teach back plan of care to RN, RN will continue to reeducate pt during hospital stay.

## 2024-08-21 ENCOUNTER — RESULT REVIEW (OUTPATIENT)
Age: 69
End: 2024-08-21

## 2024-08-21 ENCOUNTER — APPOINTMENT (OUTPATIENT)
Dept: HEMATOLOGY ONCOLOGY | Facility: CLINIC | Age: 69
End: 2024-08-21

## 2024-08-21 DIAGNOSIS — Z71.89 OTHER SPECIFIED COUNSELING: ICD-10-CM

## 2024-08-21 DIAGNOSIS — R93.1 ABNORMAL FINDINGS ON DIAGNOSTIC IMAGING OF HEART AND CORONARY CIRCULATION: ICD-10-CM

## 2024-08-21 DIAGNOSIS — F17.200 NICOTINE DEPENDENCE, UNSPECIFIED, UNCOMPLICATED: ICD-10-CM

## 2024-08-21 DIAGNOSIS — R60.0 LOCALIZED EDEMA: ICD-10-CM

## 2024-08-21 DIAGNOSIS — Z51.5 ENCOUNTER FOR PALLIATIVE CARE: ICD-10-CM

## 2024-08-21 DIAGNOSIS — J90 PLEURAL EFFUSION, NOT ELSEWHERE CLASSIFIED: ICD-10-CM

## 2024-08-21 DIAGNOSIS — I51.5 MYOCARDIAL DEGENERATION: ICD-10-CM

## 2024-08-21 DIAGNOSIS — C79.9 SECONDARY MALIGNANT NEOPLASM OF UNSPECIFIED SITE: ICD-10-CM

## 2024-08-21 LAB
A1C WITH ESTIMATED AVERAGE GLUCOSE RESULT: 5.8 % — HIGH (ref 4–5.6)
ALBUMIN FLD-MCNC: 2.3 G/DL — SIGNIFICANT CHANGE UP
ALBUMIN SERPL ELPH-MCNC: 2.6 G/DL — LOW (ref 3.3–5.2)
ALP SERPL-CCNC: 88 U/L — SIGNIFICANT CHANGE UP (ref 40–120)
ALT FLD-CCNC: 7 U/L — SIGNIFICANT CHANGE UP
ANION GAP SERPL CALC-SCNC: 12 MMOL/L — SIGNIFICANT CHANGE UP (ref 5–17)
AST SERPL-CCNC: 14 U/L — SIGNIFICANT CHANGE UP
B PERT IGG+IGM PNL SER: ABNORMAL
BASOPHILS # BLD AUTO: 0.05 K/UL — SIGNIFICANT CHANGE UP (ref 0–0.2)
BASOPHILS NFR BLD AUTO: 0.6 % — SIGNIFICANT CHANGE UP (ref 0–2)
BILIRUB SERPL-MCNC: <0.2 MG/DL — LOW (ref 0.4–2)
BUN SERPL-MCNC: 9.1 MG/DL — SIGNIFICANT CHANGE UP (ref 8–20)
CALCIUM SERPL-MCNC: 8.6 MG/DL — SIGNIFICANT CHANGE UP (ref 8.4–10.5)
CHLORIDE SERPL-SCNC: 100 MMOL/L — SIGNIFICANT CHANGE UP (ref 96–108)
CO2 SERPL-SCNC: 25 MMOL/L — SIGNIFICANT CHANGE UP (ref 22–29)
COLOR FLD: ABNORMAL
COMMENT - FLUIDS: SIGNIFICANT CHANGE UP
CREAT SERPL-MCNC: 0.49 MG/DL — LOW (ref 0.5–1.3)
EGFR: 101 ML/MIN/1.73M2 — SIGNIFICANT CHANGE UP
EOSINOPHIL # BLD AUTO: 0.31 K/UL — SIGNIFICANT CHANGE UP (ref 0–0.5)
EOSINOPHIL NFR BLD AUTO: 3.7 % — SIGNIFICANT CHANGE UP (ref 0–6)
ESTIMATED AVERAGE GLUCOSE: 120 MG/DL — HIGH (ref 68–114)
FLUID INTAKE SUBSTANCE CLASS: SIGNIFICANT CHANGE UP
GLUCOSE SERPL-MCNC: 91 MG/DL — SIGNIFICANT CHANGE UP (ref 70–99)
GRAM STN FLD: SIGNIFICANT CHANGE UP
HCT VFR BLD CALC: 37.1 % — SIGNIFICANT CHANGE UP (ref 34.5–45)
HGB BLD-MCNC: 11.8 G/DL — SIGNIFICANT CHANGE UP (ref 11.5–15.5)
IMM GRANULOCYTES NFR BLD AUTO: 0.5 % — SIGNIFICANT CHANGE UP (ref 0–0.9)
LDH SERPL L TO P-CCNC: 247 U/L — SIGNIFICANT CHANGE UP
LYMPHOCYTES # BLD AUTO: 1.08 K/UL — SIGNIFICANT CHANGE UP (ref 1–3.3)
LYMPHOCYTES # BLD AUTO: 13 % — SIGNIFICANT CHANGE UP (ref 13–44)
LYMPHOCYTES # FLD: 60 % — SIGNIFICANT CHANGE UP
MCHC RBC-ENTMCNC: 28.9 PG — SIGNIFICANT CHANGE UP (ref 27–34)
MCHC RBC-ENTMCNC: 31.8 GM/DL — LOW (ref 32–36)
MCV RBC AUTO: 90.9 FL — SIGNIFICANT CHANGE UP (ref 80–100)
MESOTHL CELL # FLD: 14 % — SIGNIFICANT CHANGE UP
MONOCYTES # BLD AUTO: 0.79 K/UL — SIGNIFICANT CHANGE UP (ref 0–0.9)
MONOCYTES NFR BLD AUTO: 9.5 % — SIGNIFICANT CHANGE UP (ref 2–14)
MONOS+MACROS # FLD: 20 % — SIGNIFICANT CHANGE UP
NEUTROPHILS # BLD AUTO: 6.02 K/UL — SIGNIFICANT CHANGE UP (ref 1.8–7.4)
NEUTROPHILS NFR BLD AUTO: 72.7 % — SIGNIFICANT CHANGE UP (ref 43–77)
NEUTROPHILS-BODY FLUID: 6 % — SIGNIFICANT CHANGE UP
PLATELET # BLD AUTO: 577 K/UL — HIGH (ref 150–400)
POTASSIUM SERPL-MCNC: 4.4 MMOL/L — SIGNIFICANT CHANGE UP (ref 3.5–5.3)
POTASSIUM SERPL-SCNC: 4.4 MMOL/L — SIGNIFICANT CHANGE UP (ref 3.5–5.3)
PROT FLD-MCNC: 4.1 G/DL — SIGNIFICANT CHANGE UP
PROT SERPL-MCNC: 5.8 G/DL — LOW (ref 6.6–8.7)
RBC # BLD: 4.08 M/UL — SIGNIFICANT CHANGE UP (ref 3.8–5.2)
RBC # FLD: 12.8 % — SIGNIFICANT CHANGE UP (ref 10.3–14.5)
RCV VOL RI: HIGH /UL (ref 0–0)
SODIUM SERPL-SCNC: 137 MMOL/L — SIGNIFICANT CHANGE UP (ref 135–145)
SPECIMEN SOURCE: SIGNIFICANT CHANGE UP
TOTAL NUCLEATED CELL COUNT, BODY FLUID: 1028 /UL — SIGNIFICANT CHANGE UP
TUBE TYPE: SIGNIFICANT CHANGE UP
WBC # BLD: 8.29 K/UL — SIGNIFICANT CHANGE UP (ref 3.8–10.5)
WBC # FLD AUTO: 8.29 K/UL — SIGNIFICANT CHANGE UP (ref 3.8–10.5)

## 2024-08-21 PROCEDURE — G0316 PROLONG INPT EVAL ADD15 M: CPT

## 2024-08-21 PROCEDURE — 93306 TTE W/DOPPLER COMPLETE: CPT | Mod: 26

## 2024-08-21 PROCEDURE — 99223 1ST HOSP IP/OBS HIGH 75: CPT

## 2024-08-21 PROCEDURE — 99497 ADVNCD CARE PLAN 30 MIN: CPT | Mod: GC,25

## 2024-08-21 PROCEDURE — 49083 ABD PARACENTESIS W/IMAGING: CPT

## 2024-08-21 PROCEDURE — 99223 1ST HOSP IP/OBS HIGH 75: CPT | Mod: GC

## 2024-08-21 PROCEDURE — 99233 SBSQ HOSP IP/OBS HIGH 50: CPT | Mod: 25

## 2024-08-21 RX ORDER — ACETAMINOPHEN 500 MG
2 TABLET ORAL
Refills: 0 | DISCHARGE

## 2024-08-21 RX ADMIN — Medication 3 MILLIGRAM(S): at 22:12

## 2024-08-21 RX ADMIN — LOSARTAN POTASSIUM 12.5 MILLIGRAM(S): 50 TABLET ORAL at 06:08

## 2024-08-21 RX ADMIN — Medication 2 PUFF(S): at 04:11

## 2024-08-21 RX ADMIN — Medication 1 PATCH: at 12:16

## 2024-08-21 RX ADMIN — Medication 1 PATCH: at 19:54

## 2024-08-21 RX ADMIN — Medication 2 MILLIGRAM(S): at 00:14

## 2024-08-21 RX ADMIN — CARVEDILOL 3.12 MILLIGRAM(S): 6.25 TABLET ORAL at 06:09

## 2024-08-21 RX ADMIN — Medication 1 PATCH: at 17:41

## 2024-08-21 RX ADMIN — Medication 2 MILLIGRAM(S): at 07:52

## 2024-08-21 NOTE — PROGRESS NOTE ADULT - ASSESSMENT
69 F PMHx peritoneal carcinoma, current smoker sent to [facility] ED from cardiologist office for new cardiomyopathy with reported EF <25 on TTE, also w/ reported pericardial effusion.    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   - Painful abdominal distention on exam  - 8/9 paracentesis via IR w/ omental biopsy showing malignant ascites  - Multiple CT show adnexal masses and peritoneal carcinomatoses  - Pain regimen placed, tylenol PRN pain 1-3, oxycodone/tylenol PRN pain 4-6, morphine 2 mg IV PRN pain 7-10  - IR therapeutic paracentesis done  - Fu Fluid studies  - Palliative care consulted   - GYN onc following    Thrombocytosis  -Chronic, likely reactive from malignancy, monitor    Sinus tachycardia w/ PVCs  - ECGs compared, 8/20 is similar to ECG on 8/5  - Monitor on Tele  - Maintain K >4, Mg >2, Phos >3  - D/w Cardiology- Likely outpatient ischemic eval    LE swelling  - Could be due HFrEF  - Duplex BLE    Active smoker  -Nicotine patch    DVT: pneumatic compression,     Dispo: Medically active, awaiting BL duplex, cardiology recommendations, possible paracentesis. Palliative following.

## 2024-08-21 NOTE — PROGRESS NOTE ADULT - SUBJECTIVE AND OBJECTIVE BOX
[facility]                                                         CARDIOLOGY AT [facility]                                                                  [address] [facility], [place]-[id number] [place] [address]- [facility]                                                         Telephone: [phone]. Fax:[phone]                                                                             PROGRESS NOTE    Reason for follow up: Cardiomyopathy  Update: patient is having discomfort in stomach  Having trouble lying down  No fever WBC 8  CXR with bilateral effusions  Patient has a lot of anxiety issues    Review of symptoms:   Cardiac:  No chest pain. No dyspnea. No palpitations.  Respiratory: no cough. No dyspnea  Gastrointestinal: No diarrhea. No abdominal pain. No bleeding. + abd discomfort  Neuro: No focal neuro complaints.      Vitals:  T(C): 36.7 (08-21-24 @ 07:14), Max: 36.8 (08-20-24 @ 15:21)  HR: 79 (08-21-24 @ 07:14) (79 - 115)  BP: 106/60 (08-21-24 @ 07:14) (104/59 - 137/104)  RR: 18 (08-21-24 @ 07:14) (18 - 20)  SpO2: 94% (08-21-24 @ 07:14) (93% - 100%)  Wt(kg): --  I&O's Summary    Weight (kg): 53.1 (08-20 @ 11:26)  PHYSICAL EXAM:  Appearance: Comfortable. No acute distress  HEENT:  Atraumatic. Normocephalic.  Normal oral mucosa  Neurologic: A & O x 3, no gross focal deficits.  Cardiovascular: RRR S1 S2, No murmur, no rubs/gallops. No JVD  Respiratory: Decreased   Gastrointestinal:  Soft, Non-tender, + BS  + ascites  Lower Extremities: No edema  Psychiatry: Patient is calm. No agitation.   Skin: warm and dry.      CURRENT MEDICATIONS:  MEDICATIONS  (STANDING):  carvedilol 3.125 milliGRAM(s) Oral every 12 hours  losartan 12.5 milliGRAM(s) Oral daily  nicotine -  14 mG/24Hr(s) Patch 1 Patch Transdermal daily  spironolactone 12.5 milliGRAM(s) Oral daily    LABS:	 	                        11.8   8.29  )-----------( 577      ( 21 Aug 2024 04:00 )             37.1     08-21    137  |  100  |  9.1  ----------------------------<  91  4.4   |  25.0  |  0.49<L>    Ca    8.6      21 Aug 2024 04:00    TPro  5.8<L>  /  Alb  2.6<L>  /  TBili  <0.2<L>  /  DBili  x   /  AST  14  /  ALT  7   /  AlkPhos  88  08-21    proBNP:   Lipid Profile: Date: 08-20 @ 15:03  Total cholesterol 143; Direct LDL: --; HDL: 49; Triglycerides:113  TSH: Thyroid Stimulating Hormone, Serum: 1.35 uIU/mL  TELEMETRY: Nsr 5 beat nsvt    DIAGNOSTIC TESTING:                                                                [facility]YS[facility]                                                         CARDIOLOGY AT [facility]                                                                  [address] [facility], [id number] [place] [address]- [facility]                                                         Telephone: [phone]. Fax:[phone]                                                                             PROGRESS NOTE    Reason for follow up: Cardiomyopathy  Update: patient is having discomfort in stomach  Having trouble lying down  No fever WBC 8  CXR with bilateral effusions  Patient has a lot of anxiety issues    Review of symptoms:   Cardiac:  No chest pain. No dyspnea. No palpitations.  Respiratory: no cough. No dyspnea  Gastrointestinal: No diarrhea. No abdominal pain. No bleeding. + abd discomfort  Neuro: No focal neuro complaints.      Vitals:  T(C): 36.7 (08-21-24 @ 07:14), Max: 36.8 (08-20-24 @ 15:21)  HR: 79 (08-21-24 @ 07:14) (79 - 115)  BP: 106/60 (08-21-24 @ 07:14) (104/59 - 137/104)  RR: 18 (08-21-24 @ 07:14) (18 - 20)  SpO2: 94% (08-21-24 @ 07:14) (93% - 100%)  Wt(kg): --  I&O's Summary    Weight (kg): 53.1 (08-20 @ 11:26)  PHYSICAL EXAM:  Appearance: Comfortable. No acute distress  HEENT:  Atraumatic. Normocephalic.  Normal oral mucosa  Neurologic: A & O x 3, no gross focal deficits.  Cardiovascular: RRR S1 S2, No murmur, no rubs/gallops. No JVD  Respiratory: Decreased   Gastrointestinal:  Soft, Non-tender, + BS  + ascites  Lower Extremities: No edema  Psychiatry: Patient is calm. No agitation.   Skin: warm and dry.      CURRENT MEDICATIONS:  MEDICATIONS  (STANDING):  carvedilol 3.125 milliGRAM(s) Oral every 12 hours  losartan 12.5 milliGRAM(s) Oral daily  nicotine -  14 mG/24Hr(s) Patch 1 Patch Transdermal daily  spironolactone 12.5 milliGRAM(s) Oral daily    LABS:	 	                        11.8   8.29  )-----------( 577      ( 21 Aug 2024 04:00 )             37.1     08-21    137  |  100  |  9.1  ----------------------------<  91  4.4   |  25.0  |  0.49<L>    Ca    8.6      21 Aug 2024 04:00    TPro  5.8<L>  /  Alb  2.6<L>  /  TBili  <0.2<L>  /  DBili  x   /  AST  14  /  ALT  7   /  AlkPhos  88  08-21    proBNP:   Lipid Profile: Date: 08-20 @ 15:03  Total cholesterol 143; Direct LDL: --; HDL: 49; Triglycerides:113  TSH: Thyroid Stimulating Hormone, Serum: 1.35 uIU/mL  TELEMETRY: Nsr 5 beat nsvt    DIAGNOSTIC TESTING:  < from: TTE W or WO Ultrasound Enhancing Agent (08.21.24 @ 10:31) >   1. Left ventricular cavity is mildly dilated. Left ventricular wall thickness is normal. Left ventricular systolic function is severely decreased with an ejection fraction of 32 % by Babcock's method of disks with an ejection fraction visually estimated at 25 to 30 %. Global left ventricular hypokinesis.   2. Normal right ventricular cavity size, with normal wall thickness, and normal right ventricular systolic function.    3. There is mild calcification of the mitral valve annulus. There is mild leaflet calcification. There is moderate mitral regurgitation. There is a linear mobile echodensity attached to the subvalvular apparatus measuring approximately 1.5 cm in length, this could represent a small fragment of calcified partially ruptured chorda tendinae, vs. less likely a small old vegetation. Consider MARYAM if clinically indicated.  4. There is a trace pericardial effusion noted adjacent to the posterolateral left ventricle, a small pericardial effusion noted adjacent to the anterior right ventricle and a small pericardial effusion noted adjacent to the right atrium. No echocardiographic evidence of tamponade.    < end of copied text >

## 2024-08-21 NOTE — CONSULT NOTE ADULT - TIME BILLING
PROVIDER:[TOKEN:[[id number]]] D/W pt, Hospitalist Dr Torres  Total time also includes discussion during interdisciplinary team rounds, chart review including but not limited to prior admissions/ GOC discussions,  review of medications/ labs/ imaging, examination, care coordination with other health care professionals, documentation EXCLUDING  advance care planning discussions.

## 2024-08-21 NOTE — CONSULT NOTE ADULT - SUBJECTIVE AND OBJECTIVE BOX
[facility] PALLIATIVE MEDICINE CONSULT    CC: Patient is a 69y old  Female who presents with a chief complaint of Cardiomyopathy (21 Aug 2024 11:48)      HPI:  69 F PMHx peritoneal carcinoma, current smoker sent to [facility] ED from cardiologist office for pericardial effusion and EF <25 on TTE. Patient was at [facility] ED in June for abdominal distension and diarrhea, was found to have bilateral adnexal masses with moderate ascites and extensive peritoneal carcinomatosis. Patient was referred to GYN ONC for concern for malignancy of gynecologic origin. At GYN patient had repeat CT that showed bilateral adnexal masses 3.0 x 2.0 cm on the right and 5.7 x 1.0 cm on the left, unremarkable uterus, moderate ascites, and extensive peritoneal carcinomatosis. 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  Patient has not started chemotherapy or radiation therapy. Went to cardiology office for ?evaluation prior to starting cancer treatment. TTE was performed as outpatient and reportedly found pericardial effusion, pleural effusion, and EF <25%. Patient endorses poor appetite, SOB, orthopnea, abdominal distention, constipation. Denies fever, chills, N/V/D, hematochezia, hematuria. (20 Aug 2024 22:10)    Ms. Basurto is a 69 year old female with hx of active smoking, has not seen a medical provider in many years, recent ED visit on 7/26 for progressive abdominal distention with CT showing ascites and peritoneal carcinomatosis and has since followed up with GYN oncology outpatient and s/p omental biopsy 8/9 confirming metastatic disease of Mullerian GYN origin (has not started on any disease directed therapies) was sent in by Cardiology after found to have reduced EF, pericardial effusion and pleural effusion as part of ?workup prior to initiation of chemotherapy, advised hospitalization for further workup. Palliative consulted for support and to assist with goals of care given new metastatic cancer diagnosis.     Pt seen in ER after return from TTE and paracentesis. Pt was easily anxious and guarded during initial visit but later more engaged in conversation.      Present Symptoms:     Dyspnea: no  Nausea/Vomiting:  No  Anxiety:  Yes    Depression: Yes due to new cancer diagnosis  Fatigue:  No  Loss of appetite:  No "i feel very hungry"  Constipation:  No    Pain: abdominal pressure pain due to ascites but better since paracentesis            Character-            Duration-            Effect-            Factors-            Frequency-            Location-            Severity-    Pain AD Score:  [email or web address] (press ctrl + left click to view)    Review of Systems: Reviewed                     Negative: denies any chest pain or dyspnea at rest                     Positive: see above  All others negative       PERTINENT PMH REVIEWED: Yes      PAST MEDICAL & SURGICAL HISTORY:  Abdominal mass      Adnexal mass      Peritoneal carcinoma      Abdominal pain      Vaginal delivery      No significant past surgical history            FAMILY HISTORY:  FH: pancreatic cancer (Sibling)    FHx: lung cancer (Sibling)    FHx: breast cancer (Mother)        Allergies    No Known Allergies    Intolerances        SOCIAL HISTORY:                      Substance history:                    Admitted from:  home  lives alone                    Children:                     Adventism/spirituality:                    Cultural concerns:       DECISION MAKER(s):[] Health Care Proxy(s)  [] Surrogate(s)  [] Guardian               ADVANCE DIRECTIVES/TREATMENT PREFERENCES: FULL CODE  DNR YES NO  Completed on:                     MOLST  YES NO   Completed on:  Living Will  YES NO   Completed on:    Karnofsky/Palliative Performance Status Version 2:  70%  http://npcrc.org/files/news/palliative_performance_scale_ppsv2.pdf    Baseline ADLs (prior to admission): Independent       MEDICATIONS  (STANDING):  carvedilol 3.125 milliGRAM(s) Oral every 12 hours  losartan 12.5 milliGRAM(s) Oral daily  nicotine -  14 mG/24Hr(s) Patch 1 Patch Transdermal daily  spironolactone 12.5 milliGRAM(s) Oral daily    MEDICATIONS  (PRN):  acetaminophen     Tablet .. 650 milliGRAM(s) Oral every 6 hours PRN Temp greater or equal to 38C (100.4F), Mild Pain (1 - 3)  albuterol    90 MICROgram(s) HFA Inhaler 2 Puff(s) Inhalation every 6 hours PRN for shortness of breath and/or wheezing  aluminum hydroxide/magnesium hydroxide/simethicone Suspension 30 milliLiter(s) Oral every 4 hours PRN Dyspepsia  melatonin 3 milliGRAM(s) Oral at bedtime PRN Insomnia  morphine  - Injectable 2 milliGRAM(s) IV Push every 6 hours PRN Severe Pain (7 - 10)  ondansetron Injectable 4 milliGRAM(s) IV Push every 8 hours PRN Nausea and/or Vomiting  oxycodone    5 mG/acetaminophen 325 mG 1 Tablet(s) Oral every 6 hours PRN Moderate Pain (4 - 6)      PHYSICAL EXAM:    Vital Signs Last 24 Hrs  T(C): 36.7 (21 Aug 2024 07:14), Max: 36.8 (20 Aug 2024 15:21)  T(F): 98 (21 Aug 2024 07:14), Max: 98.3 (20 Aug 2024 23:05)  HR: 79 (21 Aug 2024 07:14) (79 - 112)  BP: 106/60 (21 Aug 2024 07:14) (104/59 - 137/104)  BP(mean): 77 (21 Aug 2024 06:07) (77 - 78)  RR: 18 (21 Aug 2024 07:14) (18 - 20)  SpO2: 94% (21 Aug 2024 07:14) (93% - 100%)    Parameters below as of 21 Aug 2024 07:14  Patient On (Oxygen Delivery Method): room air      General: cachectic, resting comfortably.    HEENT: mmm    Lungs: comfortable nonlabored    CV:  rrr  GI: +BS abdomen soft, NTND      : normal    MSK:   no cyanosis. +BLE  edema   Neuro: nonfocal. alert and oriented x 4, interactive  Skin: warm and dry.    Psych: easily anxious and tearful    LABS:                        11.8   8.29  )-----------( 577      ( 21 Aug 2024 04:00 )             37.1     08-21    137  |  100  |  9.1  ----------------------------<  91  4.4   |  25.0  |  0.49<L>    Ca    8.6      21 Aug 2024 04:00    TPro  5.8<L>  /  Alb  2.6<L>  /  TBili  <0.2<L>  /  DBili  x   /  AST  14  /  ALT  7   /  AlkPhos  88  08-21      Urinalysis Basic - ( 21 Aug 2024 04:00 )    Color: x / Appearance: x / SG: x / pH: x  Gluc: 91 mg/dL / Ketone: x  / Bili: x / Urobili: x   Blood: x / Protein: x / Nitrite: x   Leuk Esterase: x / RBC: x / WBC x   Sq Epi: x / Non Sq Epi: x / Bacteria: x      I&O's Summary      RADIOLOGY & ADDITIONAL STUDIES:    NEUROLOGICAL MEDICATIONS/OPIOIDS/BENZODIAZEPINE OVER PAST 24 HOURS    melatonin   3 milliGRAM(s) Oral (08-20-24 @ 23:14)    morphine  - Injectable   2 milliGRAM(s) IV Push (08-21-24 @ 07:52)   2 milliGRAM(s) IV Push (08-20-24 @ 23:14)    oxycodone    5 mG/acetaminophen 325 mG   1 Tablet(s) Oral (08-21-24 @ 12:20)     [facility] PALLIATIVE MEDICINE CONSULT    CC: Patient is a 69y old  Female who presents with a chief complaint of Cardiomyopathy (21 Aug 2024 11:48)      HPI:  69 F PMHx peritoneal carcinoma, current smoker sent to [facility] ED from cardiologist office for pericardial effusion and EF <25 on TTE. Patient was at [facility] ED in June for abdominal distension and diarrhea, was found to have bilateral adnexal masses with moderate ascites and extensive peritoneal carcinomatosis. Patient was referred to GYN ONC for concern for malignancy of gynecologic origin. At GYN patient had repeat CT that showed bilateral adnexal masses 3.0 x 2.0 cm on the right and 5.7 x 1.0 cm on the left, unremarkable uterus, moderate ascites, and extensive peritoneal carcinomatosis. 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  Patient has not started chemotherapy or radiation therapy. Went to cardiology office for ?evaluation prior to starting cancer treatment. TTE was performed as outpatient and reportedly found pericardial effusion, pleural effusion, and EF <25%. Patient endorses poor appetite, SOB, orthopnea, abdominal distention, constipation. Denies fever, chills, N/V/D, hematochezia, hematuria. (20 Aug 2024 22:10)    Ms. Basurto is a 69 year old female with hx of active smoking, has not seen a medical provider in many years, recent ED visit on 7/26 for progressive abdominal distention with CT showing ascites and peritoneal carcinomatosis and has since followed up with GYN oncology outpatient and s/p omental biopsy 8/9 confirming metastatic disease of Mullerian GYN origin (has not started on any disease directed therapies) was sent in by Cardiology after found to have reduced EF, pericardial effusion and pleural effusion as part of ?workup prior to initiation of chemotherapy, advised hospitalization for further workup. Palliative consulted for support and to assist with goals of care given new metastatic cancer diagnosis.     Pt seen in ER after return from TTE and paracentesis. Pt was easily anxious and guarded during initial visit but later more engaged in conversation.      Present Symptoms:     Dyspnea: no  Nausea/Vomiting:  No  Anxiety:  Yes    Depression: Yes due to new cancer diagnosis  Fatigue:  No  Loss of appetite:  No "i feel very hungry"  Constipation:  No    Pain: abdominal pressure pain due to ascites but better since paracentesis            Character-            Duration-            Effect-            Factors-            Frequency-            Location-            Severity-    Pain AD Score:  [email or web address]/cog/painad.[email or web address] (press ctrl + left click to view)    Review of Systems: Reviewed                     Negative: denies any chest pain or dyspnea at rest                     Positive: see above  All others negative       PERTINENT PMH REVIEWED: Yes      PAST MEDICAL & SURGICAL HISTORY:  Abdominal mass      Adnexal mass      Peritoneal carcinoma      Abdominal pain      Vaginal delivery      No significant past surgical history            FAMILY HISTORY:  FH: pancreatic cancer (Sibling)    FHx: lung cancer (Sibling)    FHx: breast cancer (Mother)        Allergies    No Known Allergies    Intolerances        SOCIAL HISTORY:                      Substance history:                    Admitted from:  home  lives alone                    Children:                     Nondenominational/spirituality:                    Cultural concerns:       DECISION MAKER(s):[] Health Care Proxy(s)  [] Surrogate(s)  [] Guardian               ADVANCE DIRECTIVES/TREATMENT PREFERENCES: FULL CODE  DNR YES NO  Completed on:                     MOLST  YES NO   Completed on:  Living Will  YES NO   Completed on:    Karnofsky/Palliative Performance Status Version 2:  70%  http://npcrc.org/files/news/palliative_performance_scale_ppsv2.pdf    Baseline ADLs (prior to admission): Independent       MEDICATIONS  (STANDING):  carvedilol 3.125 milliGRAM(s) Oral every 12 hours  losartan 12.5 milliGRAM(s) Oral daily  nicotine -  14 mG/24Hr(s) Patch 1 Patch Transdermal daily  spironolactone 12.5 milliGRAM(s) Oral daily    MEDICATIONS  (PRN):  acetaminophen     Tablet .. 650 milliGRAM(s) Oral every 6 hours PRN Temp greater or equal to 38C (100.4F), Mild Pain (1 - 3)  albuterol    90 MICROgram(s) HFA Inhaler 2 Puff(s) Inhalation every 6 hours PRN for shortness of breath and/or wheezing  aluminum hydroxide/magnesium hydroxide/simethicone Suspension 30 milliLiter(s) Oral every 4 hours PRN Dyspepsia  melatonin 3 milliGRAM(s) Oral at bedtime PRN Insomnia  morphine  - Injectable 2 milliGRAM(s) IV Push every 6 hours PRN Severe Pain (7 - 10)  ondansetron Injectable 4 milliGRAM(s) IV Push every 8 hours PRN Nausea and/or Vomiting  oxycodone    5 mG/acetaminophen 325 mG 1 Tablet(s) Oral every 6 hours PRN Moderate Pain (4 - 6)      PHYSICAL EXAM:    Vital Signs Last 24 Hrs  T(C): 36.7 (21 Aug 2024 07:14), Max: 36.8 (20 Aug 2024 15:21)  T(F): 98 (21 Aug 2024 07:14), Max: 98.3 (20 Aug 2024 23:05)  HR: 79 (21 Aug 2024 07:14) (79 - 112)  BP: 106/60 (21 Aug 2024 07:14) (104/59 - 137/104)  BP(mean): 77 (21 Aug 2024 06:07) (77 - 78)  RR: 18 (21 Aug 2024 07:14) (18 - 20)  SpO2: 94% (21 Aug 2024 07:14) (93% - 100%)    Parameters below as of 21 Aug 2024 07:14  Patient On (Oxygen Delivery Method): room air      General: cachectic, resting comfortably.    HEENT: mmm    Lungs: comfortable nonlabored    CV:  rrr  GI: +BS abdomen soft, NTND      : normal    MSK:   no cyanosis. +BLE  edema   Neuro: nonfocal. alert and oriented x 4, interactive  Skin: warm and dry.    Psych: easily anxious and tearful    LABS:                        11.8   8.29  )-----------( 577      ( 21 Aug 2024 04:00 )             37.1     08-21    137  |  100  |  9.1  ----------------------------<  91  4.4   |  25.0  |  0.49<L>    Ca    8.6      21 Aug 2024 04:00    TPro  5.8<L>  /  Alb  2.6<L>  /  TBili  <0.2<L>  /  DBili  x   /  AST  14  /  ALT  7   /  AlkPhos  88  08-21      Urinalysis Basic - ( 21 Aug 2024 04:00 )    Color: x / Appearance: x / SG: x / pH: x  Gluc: 91 mg/dL / Ketone: x  / Bili: x / Urobili: x   Blood: x / Protein: x / Nitrite: x   Leuk Esterase: x / RBC: x / WBC x   Sq Epi: x / Non Sq Epi: x / Bacteria: x      I&O's Summary      RADIOLOGY & ADDITIONAL STUDIES:    CXR    ACC: [id number] EXAM:  XR CHEST PA LAT 2V   ORDERED BY: VERITO GARCIA     PROCEDURE DATE:  08/20/2024          INTERPRETATION:  PA and lateral chest on August 20, 2024 2:51 PM. Patient   is being followed for left effusion.    Heart show some enlargement.    There is a persistent mild to moderate left base effusion.    Chest is similar to August 12.    IMPRESSION: Unchanged chest with mild to moderate left base effusion.    --- End of Report ---    CHRISTOPHE VASQUES MD; Attending Radiologist  This document has been electronically signed. Aug 21 2024  1:43PM    NEUROLOGICAL MEDICATIONS/OPIOIDS/BENZODIAZEPINE OVER PAST 24 HOURS    melatonin   3 milliGRAM(s) Oral (08-20-24 @ 23:14)    morphine  - Injectable   2 milliGRAM(s) IV Push (08-21-24 @ 07:52)   2 milliGRAM(s) IV Push (08-20-24 @ 23:14)    oxycodone    5 mG/acetaminophen 325 mG   1 Tablet(s) Oral (08-21-24 @ 12:20)

## 2024-08-21 NOTE — PROGRESS NOTE ADULT - PROBLEM SELECTOR PLAN 2
EF 25%  Low na diet  CXR with bilateral effusions ?? malignant  needs pleuracentesis  out patient ischemic work up    GDMT  Hold loop diuretic  ARB  losartan 12.5  MRa Aldactone 12.5 mg  Beta blocker coreg 3.125 bid EF 25%  Low na diet  CXR with bilateral effusions ?? malignant  needs pleuracentesis  out patient ischemic work up  Patient offered a left heart cath since she will require surgery at some point but refuses   would like it after her chemo    GDMT  Hold loop diuretic  ARB  losartan 12.5  MRa Aldactone 12.5 mg  Beta blocker coreg 3.125 bid EF 25%  Low na diet  CXR with bilateral effusions ?? malignant  needs pleuracentesis  out patient ischemic work up  Patient offered a left heart cath since she will require surgery at some point but refuses   would like it after her chemo  5 beat NSVT continue monitor keep k >4 and mg >2    GDMT  Hold loop diuretic  ARB  losartan 12.5  MRa Aldactone 12.5 mg  Beta blocker coreg 3.125 bid

## 2024-08-21 NOTE — PROGRESS NOTE ADULT - SUBJECTIVE AND OBJECTIVE BOX
Patient is a 69y old  Female who presents with a chief complaint of Cardiomyopathy (21 Aug 2024 08:42)      SUBJECTIVE / OVERNIGHT EVENTS: Seen and examined. Feels ok. has mild abdo pain. Denies chest pain, SOB, N/V, fever, chills, dysuria.     MEDICATIONS  (STANDING):  carvedilol 3.125 milliGRAM(s) Oral every 12 hours  losartan 12.5 milliGRAM(s) Oral daily  nicotine -  14 mG/24Hr(s) Patch 1 Patch Transdermal daily  spironolactone 12.5 milliGRAM(s) Oral daily    MEDICATIONS  (PRN):  acetaminophen     Tablet .. 650 milliGRAM(s) Oral every 6 hours PRN Temp greater or equal to 38C (100.4F), Mild Pain (1 - 3)  albuterol    90 MICROgram(s) HFA Inhaler 2 Puff(s) Inhalation every 6 hours PRN for shortness of breath and/or wheezing  aluminum hydroxide/magnesium hydroxide/simethicone Suspension 30 milliLiter(s) Oral every 4 hours PRN Dyspepsia  melatonin 3 milliGRAM(s) Oral at bedtime PRN Insomnia  morphine  - Injectable 2 milliGRAM(s) IV Push every 6 hours PRN Severe Pain (7 - 10)  ondansetron Injectable 4 milliGRAM(s) IV Push every 8 hours PRN Nausea and/or Vomiting  oxycodone    5 mG/acetaminophen 325 mG 1 Tablet(s) Oral every 6 hours PRN Moderate Pain (4 - 6)        I&O's Summary      PHYSICAL EXAM:  Vital Signs Last 24 Hrs  T(C): 36.7 (21 Aug 2024 07:14), Max: 36.8 (20 Aug 2024 15:21)  T(F): 98 (21 Aug 2024 07:14), Max: 98.3 (20 Aug 2024 23:05)  HR: 79 (21 Aug 2024 07:14) (79 - 112)  BP: 106/60 (21 Aug 2024 07:14) (104/59 - 137/104)  BP(mean): 77 (21 Aug 2024 06:07) (77 - 78)  RR: 18 (21 Aug 2024 07:14) (18 - 20)  SpO2: 94% (21 Aug 2024 07:14) (93% - 100%)    Parameters below as of 21 Aug 2024 07:14  Patient On (Oxygen Delivery Method): room air            CONSTITUTIONAL: NAD, frail appearing, elderly female  ENMT: Moist oral mucosa, no pharyngeal injection or exudates; normal dentition  RESPIRATORY: Normal respiratory effort; lungs are clear to auscultation bilaterally  CARDIOVASCULAR: Regular rate and rhythm, normal S1 and S2, no murmur/rub/gallop; 2+ lower extremity edema; Peripheral pulses are 2+ bilaterally  ABDOMEN: Abdo Mildly distended and tender to palpation, normoactive bowel sounds, no rebound/guarding  MUSCLOSKELETAL:  Normal gait; no clubbing or cyanosis of digits; no joint swelling or tenderness to palpation  PSYCH: A+O to person, place, and time; affect appropriate  NEUROLOGY: CN 2-12 are intact and symmetric; no gross sensory deficits;   SKIN: No rashes; no palpable lesions    LABS:                        11.8   8.29  )-----------( 577      ( 21 Aug 2024 04:00 )             37.1     08-21    137  |  100  |  9.1  ----------------------------<  91  4.4   |  25.0  |  0.49<L>    Ca    8.6      21 Aug 2024 04:00    TPro  5.8<L>  /  Alb  2.6<L>  /  TBili  <0.2<L>  /  DBili  x   /  AST  14  /  ALT  7   /  AlkPhos  88  08-21          Urinalysis Basic - ( 21 Aug 2024 04:00 )    Color: x / Appearance: x / SG: x / pH: x  Gluc: 91 mg/dL / Ketone: x  / Bili: x / Urobili: x   Blood: x / Protein: x / Nitrite: x   Leuk Esterase: x / RBC: x / WBC x   Sq Epi: x / Non Sq Epi: x / Bacteria: x        CAPILLARY BLOOD GLUCOSE            RADIOLOGY & ADDITIONAL TESTS:  Results Reviewed:   Imaging Personally Reviewed:  Electrocardiogram Personally Reviewed:

## 2024-08-21 NOTE — CONSULT NOTE ADULT - ASSESSMENT
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 Palliative consulted for support and to assist with goals of care in the setting of newly diagnosed metastatic cancer.    Newly Diagnosed Cardiomyopathy, HFrEF with Pleural Effusion  - has not seen any providers for several years  - outpatient echo by cardiology:  preformed in office 8-19-24 EF 25%  mild mr small pericardial effusions bilateral pleural effusions   - s/p TTE this AM, final read pending  - Cardiology input appreciated  - GDMT per Cardio    Metastatic Cancer of Mullerian GYN Origin  - recently diagnosed after confirmatory omental bx on 8/9  - imaging reviewed including CT from 7/26 with bilateral adnexal masses with extensive peritoneal carcinomatosis, +ascites  - s/p paracentesis today with ~5L removal by IR  - D/W GYN Onc BENTLEY Persaud today, patient known to their team and seen for the first time ~ 3 weeks ago regarding new metastatic cancer diagnosis and plan was for neoadjuvant chemotherapy followed by possible debulking surgery and HIPEC. They will follow up with cardiology, discuss further with attending and follow up with pt regarding treatment options and prognosis. Will plan to follow up for ongoing goal of care pending their conversation.     Active Smoker  - education provided regarding smoking cessation   - nicotine patch    BLE Edema  - pending Doppler to rule out DVT given active smoker and new cancer diagnosis    Advance Care Planning  Palliative Care Encounter  - Met with patient at bedside after she returned from TTE and paracentesis. She was guarded and apprehensive to engage in conversation after introducing role and support of palliative team in the setting of life limiting illness "are your here to tell me I am dying." Reassurance provided that role is supportive especially given her new metastatic cancer, helping her navigate with complex medical decision making as well as with symptom management. She did open up further, shared loss of her  and has support of grandchildren and friends. Writer inquired further about her support system but she remained guarded. She shared feeling overwhelmed,  still coping with the news of her recent cancer diagnosis and planned treatment.  also came by during our visit for spiritual support. Pt amendable to palliative follow up and continued conversation based on her workup and input from other disciplines. She had no other questions at this time for writer.     Updated hospitalist on above. Palliative team will continue to support and follow up for ongoing goals of care.

## 2024-08-21 NOTE — PROGRESS NOTE ADULT - PROBLEM SELECTOR PLAN 3
There is a linear mobile echodensity attached to the subvalvular apparatus measuring approximately 1.5 cm in length, this could represent a small fragment of calcified partially ruptured chorda tendinae, vs. less likely a small old vegetation.     Discussed with patient MARYAM in am and she agrees

## 2024-08-22 LAB
ALBUMIN SERPL ELPH-MCNC: 2.1 G/DL — LOW (ref 3.3–5.2)
ALP SERPL-CCNC: 72 U/L — SIGNIFICANT CHANGE UP (ref 40–120)
ALT FLD-CCNC: 6 U/L — SIGNIFICANT CHANGE UP
ANION GAP SERPL CALC-SCNC: 6 MMOL/L — SIGNIFICANT CHANGE UP (ref 5–17)
AST SERPL-CCNC: 12 U/L — SIGNIFICANT CHANGE UP
BILIRUB SERPL-MCNC: <0.2 MG/DL — LOW (ref 0.4–2)
BUN SERPL-MCNC: 12.5 MG/DL — SIGNIFICANT CHANGE UP (ref 8–20)
CALCIUM SERPL-MCNC: 8.2 MG/DL — LOW (ref 8.4–10.5)
CHLORIDE SERPL-SCNC: 98 MMOL/L — SIGNIFICANT CHANGE UP (ref 96–108)
CO2 SERPL-SCNC: 27 MMOL/L — SIGNIFICANT CHANGE UP (ref 22–29)
CREAT SERPL-MCNC: 0.69 MG/DL — SIGNIFICANT CHANGE UP (ref 0.5–1.3)
EGFR: 93 ML/MIN/1.73M2 — SIGNIFICANT CHANGE UP
GLUCOSE SERPL-MCNC: 98 MG/DL — SIGNIFICANT CHANGE UP (ref 70–99)
HCT VFR BLD CALC: 33.3 % — LOW (ref 34.5–45)
HGB BLD-MCNC: 10.5 G/DL — LOW (ref 11.5–15.5)
MAGNESIUM SERPL-MCNC: 1.7 MG/DL — SIGNIFICANT CHANGE UP (ref 1.6–2.6)
MCHC RBC-ENTMCNC: 28.9 PG — SIGNIFICANT CHANGE UP (ref 27–34)
MCHC RBC-ENTMCNC: 31.5 GM/DL — LOW (ref 32–36)
MCV RBC AUTO: 91.7 FL — SIGNIFICANT CHANGE UP (ref 80–100)
PHOSPHATE SERPL-MCNC: 3.2 MG/DL — SIGNIFICANT CHANGE UP (ref 2.4–4.7)
PLATELET # BLD AUTO: 617 K/UL — HIGH (ref 150–400)
POTASSIUM SERPL-MCNC: 4.6 MMOL/L — SIGNIFICANT CHANGE UP (ref 3.5–5.3)
POTASSIUM SERPL-SCNC: 4.6 MMOL/L — SIGNIFICANT CHANGE UP (ref 3.5–5.3)
PROT SERPL-MCNC: 5.2 G/DL — LOW (ref 6.6–8.7)
RBC # BLD: 3.63 M/UL — LOW (ref 3.8–5.2)
RBC # FLD: 12.8 % — SIGNIFICANT CHANGE UP (ref 10.3–14.5)
SODIUM SERPL-SCNC: 131 MMOL/L — LOW (ref 135–145)
WBC # BLD: 8.47 K/UL — SIGNIFICANT CHANGE UP (ref 3.8–10.5)
WBC # FLD AUTO: 8.47 K/UL — SIGNIFICANT CHANGE UP (ref 3.8–10.5)

## 2024-08-22 PROCEDURE — 99232 SBSQ HOSP IP/OBS MODERATE 35: CPT

## 2024-08-22 PROCEDURE — 99233 SBSQ HOSP IP/OBS HIGH 50: CPT

## 2024-08-22 RX ADMIN — Medication 1 PATCH: at 08:53

## 2024-08-22 RX ADMIN — Medication 3 MILLIGRAM(S): at 21:57

## 2024-08-22 RX ADMIN — Medication 1 PATCH: at 08:54

## 2024-08-22 RX ADMIN — Medication 1 PATCH: at 08:48

## 2024-08-22 RX ADMIN — LOSARTAN POTASSIUM 12.5 MILLIGRAM(S): 50 TABLET ORAL at 06:27

## 2024-08-22 RX ADMIN — Medication 1 PATCH: at 19:22

## 2024-08-22 RX ADMIN — Medication 25 GRAM(S): at 10:13

## 2024-08-22 RX ADMIN — CARVEDILOL 3.12 MILLIGRAM(S): 6.25 TABLET ORAL at 06:26

## 2024-08-22 RX ADMIN — SPIRONOLACTONE 12.5 MILLIGRAM(S): 25 TABLET, FILM COATED ORAL at 06:27

## 2024-08-22 NOTE — PROGRESS NOTE ADULT - SUBJECTIVE AND OBJECTIVE BOX
Patient is a 69y old  Female who presents with a chief complaint of Cardiomyopathy (22 Aug 2024 11:49)      SUBJECTIVE / OVERNIGHT EVENTS: Seen and examined. Feels ok. Denies chest pain, SOB, abd pain, N/V, fever, chills, dysuria. Initially refused MARYAM however now amenable for LHC/MARYAM. scheduled for rahul.    MEDICATIONS  (STANDING):  carvedilol 3.125 milliGRAM(s) Oral every 12 hours  losartan 12.5 milliGRAM(s) Oral daily  nicotine -  14 mG/24Hr(s) Patch 1 Patch Transdermal daily  spironolactone 12.5 milliGRAM(s) Oral daily    MEDICATIONS  (PRN):  acetaminophen     Tablet .. 650 milliGRAM(s) Oral every 6 hours PRN Temp greater or equal to 38C (100.4F), Mild Pain (1 - 3)  albuterol    90 MICROgram(s) HFA Inhaler 2 Puff(s) Inhalation every 6 hours PRN for shortness of breath and/or wheezing  aluminum hydroxide/magnesium hydroxide/simethicone Suspension 30 milliLiter(s) Oral every 4 hours PRN Dyspepsia  melatonin 3 milliGRAM(s) Oral at bedtime PRN Insomnia  morphine  - Injectable 2 milliGRAM(s) IV Push every 6 hours PRN Severe Pain (7 - 10)  ondansetron Injectable 4 milliGRAM(s) IV Push every 8 hours PRN Nausea and/or Vomiting  oxycodone    5 mG/acetaminophen 325 mG 1 Tablet(s) Oral every 6 hours PRN Moderate Pain (4 - 6)        I&O's Summary      PHYSICAL EXAM:  Vital Signs Last 24 Hrs  T(C): 36.6 (22 Aug 2024 10:57), Max: 37 (21 Aug 2024 21:07)  T(F): 97.9 (22 Aug 2024 10:57), Max: 98.6 (21 Aug 2024 21:07)  HR: 78 (22 Aug 2024 10:57) (77 - 87)  BP: 106/58 (22 Aug 2024 10:57) (100/59 - 120/64)  BP(mean): 68 (22 Aug 2024 03:18) (68 - 68)  RR: 18 (22 Aug 2024 10:57) (16 - 18)  SpO2: 97% (22 Aug 2024 10:57) (92% - 97%)    Parameters below as of 22 Aug 2024 10:57  Patient On (Oxygen Delivery Method): room air          CONSTITUTIONAL: NAD, frail appearing, elderly female  ENMT: Moist oral mucosa, no pharyngeal injection or exudates; normal dentition  RESPIRATORY: Normal respiratory effort; lungs are clear to auscultation bilaterally  CARDIOVASCULAR: Regular rate and rhythm, normal S1 and S2, no murmur/rub/gallop; 2+ lower extremity edema; Peripheral pulses are 2+ bilaterally  ABDOMEN: Abdo nontender to palpation, normoactive bowel sounds, no rebound/guarding  MUSCLOSKELETAL:  Normal gait; no clubbing or cyanosis of digits; no joint swelling or tenderness to palpation  PSYCH: A+O to person, place, and time; affect appropriate  NEUROLOGY: CN 2-12 are intact and symmetric; no gross sensory deficits;   SKIN: No rashes; no palpable lesions    LABS:                        10.5   8.47  )-----------( 617      ( 22 Aug 2024 05:53 )             33.3     08-22    131<L>  |  98  |  12.5  ----------------------------<  98  4.6   |  27.0  |  0.69    Ca    8.2<L>      22 Aug 2024 05:53  Phos  3.2     08-22  Mg     1.7     08-22    TPro  5.2<L>  /  Alb  2.1<L>  /  TBili  <0.2<L>  /  DBili  x   /  AST  12  /  ALT  6   /  AlkPhos  72  08-22          Urinalysis Basic - ( 22 Aug 2024 05:53 )    Color: x / Appearance: x / SG: x / pH: x  Gluc: 98 mg/dL / Ketone: x  / Bili: x / Urobili: x   Blood: x / Protein: x / Nitrite: x   Leuk Esterase: x / RBC: x / WBC x   Sq Epi: x / Non Sq Epi: x / Bacteria: x        Culture - Body Fluid with Gram Stain (collected 21 Aug 2024 10:51)  Source: Paracentesis Paracentesis Fluid  Gram Stain (21 Aug 2024 23:40):    No polymorphonuclear cells seen per low power field    No organisms seen per oil power field      CAPILLARY BLOOD GLUCOSE            RADIOLOGY & ADDITIONAL TESTS:  Results Reviewed:   Imaging Personally Reviewed:  Electrocardiogram Personally Reviewed:

## 2024-08-22 NOTE — CONSULT NOTE ADULT - ASSESSMENT
A/P: 69 F PMHx peritoneal carcinomatosis, current smoker sent to [facility] ED from cardiologist office for pericardial effusion and EF <25 on TTE. Known to gyn onc service as relatively new patient due to recent finding of peritoneal carcinomatosis.     #CHF   -reduced EF on outpatient and inpatient TTE to 25%  -Euvolemic on exam with signs of peripheral hypoxia  -Patient originally declining MARYAM to investigate possible valve vegetation vs calcified chordae tendinae, now amenable to MARYAM and LHC (scheduled 8/23)  -Managed with coreg, losartan, spironolactone  -Care per cardiology and primary team    #Peritoneal carcinomatosis with bilateral adnexal masses  -Patient presented to [facility] ED with distention/pain, was found to have carcinomatosis and adnexal masses, she followed up with Dr Martinez outpatient on 7/31 for presumed advanced GYN related cancer  -IR biopsy of carcinomatosis done outpatient 8/9 revealing "positive for metastatic carcinoma, most consistent with Mullerian GYN primary" A/P: 69 F PMHx peritoneal carcinomatosis, current smoker sent to [facility] ED from cardiologist office for pericardial effusion and EF <25 on TTE. Known to gyn onc service as relatively new patient due to recent finding of peritoneal carcinomatosis.     #CHF   -reduced EF on outpatient and inpatient TTE to 25%  -Euvolemic on exam with signs of peripheral hypoxia  -Patient originally declining MARYAM to investigate possible valve vegetation vs calcified chordae tendinae, now amenable to MARYAM and LHC (scheduled 8/23)  -Managed with coreg, losartan, spironolactone  -Care per cardiology and primary team    #Peritoneal carcinomatosis with bilateral adnexal masses  -Patient presented to [facility] ED with distention/pain, was found to have carcinomatosis and adnexal masses, she followed up with Dr Martinez outpatient on 7/31 for presumed advanced GYN related cancer  -IR biopsy of carcinomatosis done outpatient 8/9 revealing "positive for metastatic carcinoma, most consistent with Mullerian GYN primary"  -Original plan was Carbo AUC 5 / Taxol 175mg/m2 +/- Bevacizumab 15mg/kg D1 q21d x 3C followed by repeat imaging +/- interval debulk with HIPEC followed by at least 2-3C adjuvant treatment.  -Further discussion with patient about her goals of care and prognosis with added element of CHF is necessary. Patient makes it clear that she is interested in pursuing treatment but would like more clarity on how chemo works with her heart disease. Understands that she is still undergoing inpatient work up of her HFrEF.     Further plan to be discussed with Dr Martinez and Dr Jennings  INCOMPLETE

## 2024-08-22 NOTE — PROGRESS NOTE ADULT - SUBJECTIVE AND OBJECTIVE BOX
[facility]                                                         CARDIOLOGY AT [facility]                                                                  [address] [facility] [place] [place] [address]- [facility]                                                         Telephone: [phone]. Fax:[phone]                                                                             PROGRESS NOTE    Reason for follow up: cardiomyopathy   Update: Pt seen and examined at the bedside. Pt had questions about MARYAM/LHC, however after discussion with pt, pt is agreeable for LHC & MARYAM. Plan for MARYAM and LHC tomorrow. Otherwise, no complaints.     Review of symptoms:   Cardiac:  No chest pain. No dyspnea. No palpitations.  Respiratory: no cough. No dyspnea  Gastrointestinal: No diarrhea. No abdominal pain. No bleeding.   Neuro: No focal neuro complaints.    Vitals:  T(C): 36.6 (08-22-24 @ 10:57), Max: 37 (08-21-24 @ 21:07)  HR: 78 (08-22-24 @ 10:57) (77 - 87)  BP: 106/58 (08-22-24 @ 10:57) (100/59 - 120/64)  RR: 18 (08-22-24 @ 10:57) (16 - 18)  SpO2: 97% (08-22-24 @ 10:57) (92% - 97%)  Wt(kg): --  I&O's Summary    Weight (kg): 53.1 (08-20 @ 11:26)    PHYSICAL EXAM:  Appearance: Comfortable. No acute distress  HEENT:  Atraumatic. Normocephalic.  Normal oral mucosa  Neurologic: A & O x 3, no gross focal deficits.  Cardiovascular: RRR S1 S2, No murmur, no rubs/gallops. No JVD  Respiratory: Lungs clear to auscultation, unlabored   Gastrointestinal:  Soft, Non-tender, + BS  Lower Extremities: 2+ Peripheral Pulses, No clubbing, cyanosis, or edema  Psychiatry: Patient is calm. No agitation.   Skin: warm and dry.    CURRENT CARDIAC MEDICATIONS:  carvedilol 3.125 milliGRAM(s) Oral every 12 hours  losartan 12.5 milliGRAM(s) Oral daily  spironolactone 12.5 milliGRAM(s) Oral daily      CURRENT OTHER MEDICATIONS:  albuterol    90 MICROgram(s) HFA Inhaler 2 Puff(s) Inhalation every 6 hours PRN for shortness of breath and/or wheezing  acetaminophen     Tablet .. 650 milliGRAM(s) Oral every 6 hours PRN Temp greater or equal to 38C (100.4F), Mild Pain (1 - 3)  melatonin 3 milliGRAM(s) Oral at bedtime PRN Insomnia  morphine  - Injectable 2 milliGRAM(s) IV Push every 6 hours PRN Severe Pain (7 - 10)  ondansetron Injectable 4 milliGRAM(s) IV Push every 8 hours PRN Nausea and/or Vomiting  oxycodone    5 mG/acetaminophen 325 mG 1 Tablet(s) Oral every 6 hours PRN Moderate Pain (4 - 6)  aluminum hydroxide/magnesium hydroxide/simethicone Suspension 30 milliLiter(s) Oral every 4 hours PRN Dyspepsia      LABS:	 	                            10.5   8.47  )-----------( 617      ( 22 Aug 2024 05:53 )             33.3     08-22    131<L>  |  98  |  12.5  ----------------------------<  98  4.6   |  27.0  |  0.69    Ca    8.2<L>      22 Aug 2024 05:53  Phos  3.2     08-22  Mg     1.7     08-22    TPro  5.2<L>  /  Alb  2.1<L>  /  TBili  <0.2<L>  /  DBili  x   /  AST  12  /  ALT  6   /  AlkPhos  72  08-22    PT/INR/PTT ( 05 Aug 2024 17:10 )                       :                       :      11.5         :       28.2                  .        .                   .              .           .       1.04        .                                       Lipid Profile: Date: 08-20 @ 15:03  Total cholesterol 143; Direct LDL: --; HDL: 49; Triglycerides:113    HgA1c: 5.8%   TSH: Thyroid Stimulating Hormone, Serum: 1.35 uIU/mL      TELEMETRY: NSR   ECG: NSR      DIAGNOSTIC TESTING:  [X ] Echocardiogram: < from: TTE W or WO Ultrasound Enhancing Agent (08.21.24 @ 10:31) >     1. Left ventricular cavity is mildly dilated. Left ventricular wall thickness is normal. Left ventricular systolic function is severely decreased with an ejection fraction of 32 % by Babcock's method of disks with an ejection fraction visually estimated at 25 to 30 %. Global left ventricular hypokinesis.   2. Normal right ventricular cavity size, with normal wall thickness, and normal right ventricular systolic function.    3. There is mild calcification of the mitral valve annulus. There is mild leaflet calcification. There is moderate mitral regurgitation. There is a linear mobile echodensity attached to the subvalvular apparatus measuring approximately 1.5 cm in length, this could represent a small fragment of calcified partially ruptured chorda tendinae, vs. less likely a small old vegetation. Consider MARYAM if clinically indicated.  4. There is a trace pericardial effusion noted adjacent to the posterolateral left ventricle, a small pericardial effusion noted adjacent to the anterior right ventricle and a small pericardial effusion noted adjacent to the right atrium. No echocardiographic evidence of tamponade.    < end of copied text >    [ ]  Catheterization:  [ ] Stress Test:    OTHER:

## 2024-08-22 NOTE — DIETITIAN INITIAL EVALUATION ADULT - ETIOLOGY
related to inability to meet sufficient protein-energy needs in setting of Mullerian malignancy with mets, ascites, decreased appetite/early satiety

## 2024-08-22 NOTE — CONSULT NOTE ADULT - ATTENDING COMMENTS
Patient seen and examined at bedside. Agree with assessment and plan as above:   - 68 y/o  female with recent Dx of ovarian CA< at ,least Stage III, c/b new Dx of CHF  - Admitted to Cardiology, greatly appreciate workup and tx  - EF 25-30% per recent echo  - Currently being medically optimized, for MARYAM and Coronary Angiogam tomorrow, will f/u results  - As for Oncology mgmt, pt is candidate for neoadjuvant chemotherapy (3C of Carbo/Taxol) followed by possible interval debulking surgery  - Planned chemotherapy regimen is not cardiotoxic, should be tolerated despite pt's current condition  - Once Cardiology procedures are completed/found to be reassuring, pt can receive C1 during this admission, perhaps 8/24/24  - Discussed w pt, will D/W Primary team  - Otherwise no GYN Oncology interventions    Discussed w Dr. Martinez and Dr. Dick Jalloh MD  Advanced Pelvic Surgery Fellow

## 2024-08-22 NOTE — DIETITIAN INITIAL EVALUATION ADULT - OTHER INFO
69yoF with above PMHx sent to hospital for new cardiomyopathy seen on outpatient TTE w/ reported EF <25%.  Pt seen by cardiology who wants to trial some GDMT as above w/out loop diuretic given pericardial effusion noted on TTE.  Unclear etiology of cardiomyopathy as pt has not yet started cancer treatment but pt is active smoker so has CAD risk.   Repeat TTE ordered and pending.  IR consult for therapeutic paracentesis given distention noted on exam.  Per outpatient records, received prior paracentesis on 8/9 w/ 2L removed.  Small L-pleural effusion noted on CXR, will hold off on CT surgery consult as appear small but can call if official CXR report notes moderate in size.

## 2024-08-22 NOTE — PROGRESS NOTE ADULT - ASSESSMENT
69 F PMHx peritoneal carcinoma, current smoker sent to [facility] ED from cardiologist office for new cardiomyopathy with reported EF <25 on TTE, also w/ reported pericardial effusion. S/p Paracentesis of 5L. Found to have linear mobile echodensity approx 1.5cm in length. Plan for LHC/MARYAM Friday.     Plan:  New onset HFrEF w/ Pericardial effusion   -Unclear etiology for new cardiomyopathy  - Chest X-ray showed cardiomegaly and small left sided pleural effusion  - ECG showed ST w/ PVCs similar to previous ECG from 8/5  - TTE with low EF 25-30%, linear mobile echo attached to subvalvular apparatus approx 1.5cm in length, could be partially ruptured chorda tendinae vs less likely a small old vegetation.   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   - Painful abdominal distention on exam  - 8/9 paracentesis via IR w/ omental biopsy showing malignant ascites  - Multiple CT show adnexal masses and peritoneal carcinomatoses  - Pain regimen placed, tylenol PRN pain 1-3, oxycodone/tylenol PRN pain 4-6, morphine 2 mg IV PRN pain 7-10  - IR therapeutic paracentesis done  - Fu Fluid studies  - Palliative care following  - GYN onc following    Thrombocytosis  -Chronic, likely reactive from malignancy, monitor    Sinus tachycardia w/ PVCs  - ECGs compared, 8/20 is similar to ECG on 8/5  - Monitor on Tele  - Maintain K >4, Mg >2, Phos >3  - D/w Cardiology- NPO for LHC/MARYAM Friday    LE swelling  - Could be due HFrEF  - Duplex BLE    Active smoker  -Nicotine patch    DVT: pneumatic compression    Dispo: Medically active, awaiting BL duplex, cardiology recommendations, Palliative following. Likely for LHC/MARYAM.

## 2024-08-22 NOTE — DIETITIAN NUTRITION RISK NOTIFICATION - ADDITIONAL COMMENTS/DIETITIAN RECOMMENDATIONS
1) Add Ensure HP/Enlive BID  2) Rx MVI daily  3) Encourage HBV protein sources  4) Monitor weights daily for trend/accuracy

## 2024-08-22 NOTE — PROGRESS NOTE ADULT - PROBLEM SELECTOR PLAN 3
- TTE revealed linear mobile echodensity attached to the subvalvular apparatus measuring approximately 1.5 cm in length, this could represent a small fragment of calcified partially ruptured chorda tendinae, vs. less likely a small old vegetation.  - Pt agreeable for MARYAM, keep npo after midnight tonight for MARYAM tomorrow, 8/23.

## 2024-08-22 NOTE — CHART NOTE - NSCHARTNOTEFT_GEN_A_CORE
Called by RN after pt had few beats of AIVR on monitor.  Pt is asymptomatic, in no distress.  VSS.  AM labs ordered.  Monitor and reassess prn.

## 2024-08-22 NOTE — DIETITIAN INITIAL EVALUATION ADULT - ORAL INTAKE PTA/DIET HISTORY
Brief interview conducted 2/2 pt seen eating breakfast, endorses a much improved appetite/PO intake s/p paracentesis yesterday with 5050mL fluid drained. Pt denies following any dietary restrictions, confirms weight July 2024 117lbs with 6lb weight loss x ~3 weeks, admission weight 111lbs, further weight loss likely masked by ascites. Discussed DASH/TLC diet ordered, encouraged emphasis on protein intake at meals.

## 2024-08-22 NOTE — CONSULT NOTE ADULT - SUBJECTIVE AND OBJECTIVE BOX
GYNECOLOGIC ONCOLOGY CONSULT NOTE    70yo   (LMP age 50)  Hx:  69 F PMHx peritoneal carcinomatosis, current smoker sent to [facility] ED from cardiologist office for pericardial effusion and EF <25 on TTE. Patient was at [facility] ED in  for abdominal distension and diarrhea, was found to have bilateral adnexal masses with moderate ascites and extensive peritoneal carcinomatosis. Patient was referred to GYN ONC for concern for malignancy of gynecologic origin. At GYN patient had repeat CT that showed bilateral adnexal masses 3.0 x 2.0 cm on the right and 5.7 x 1.0 cm on the left, unremarkable uterus, moderate ascites, and extensive peritoneal carcinomatosis. 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  Patient has not started chemotherapy or radiation therapy. Went to cardiology office for evaluation prior to starting cancer treatment. TTE was performed as outpatient and reportedly found pericardial effusion, pleural effusion, and EF <25%. Patient endorses poor appetite, SOB, orthopnea, abdominal distention, constipation. Denies fever, chills, N/V/D, hematochezia, hematuria. (20 Aug 2024 22:10)    From GYN Onc perspective: Pt was seen in the office as a new patient referred from [facility] ED on . She had not seen physicians for health monitoring for over 10 years until she began to have distention and constipation over the last month. 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 IR biopsy was done showing Mullerian origin--likely ovarian. At that time the plan was Carbo AUC 5 / Taxol 175mg/m2 +/- Bevacizumab 15mg/kg D1 q21d x 3C followed by repeat imaging +/- interval debulk with HIPEC followed by at least 2-3C adjuvant treatment.     Patient seen and examined at bedside. Reports constipation, but improvement in her abdominal pain s/p paracentesis. Denies CP, SOB, dizziness, n/v, diarrhea. She reports a history of ocular migraines where she gets visual aura without headache. Reports 20 lb unintentional weight loss in last 2 years but difficult to differentiate cancer vs grief over losing her . No early satiety until her recent abdominal distention/pain.  She reports being frustrated with the heart failure diagnosis and would like to discuss what this findings means for the chemo +/- surgery plan she had discussed with Dr Martinez.     OB:  via . postmenopausal. 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 + weight loss  EYES: No eye pain, visual disturbances, or discharge  ENMT:  No difficulty hearing, tinnitus, vertigo; No sinus or throat pain  NECK: No pain or stiffness  BREASTS: No pain, masses, or nipple discharge  RESPIRATORY: No cough, wheezing, chills or hemoptysis; No shortness of breath  CARDIOVASCULAR: No chest pain, palpitations, dizziness, or leg swelling  GASTROINTESTINAL: No abdominal or epigastric pain. No nausea, vomiting, or hematemesis; No diarrhea . No melena or hematochezia. + constipation  GENITOURINARY: No dysuria, frequency, hematuria, or incontinence  NEUROLOGICAL: No headaches, memory loss, loss of strength, numbness, or tremors  SKIN: No itching, burning, rashes, or lesions   LYMPH NODES: No enlarged glands  ENDOCRINE: No heat or cold intolerance; No hair loss  MUSCULOSKELETAL: No joint pain or swelling; No muscle, back, or extremity pain  PSYCHIATRIC: No depression, anxiety, mood swings, or difficulty sleeping  HEME/LYMPH: No easy bruising, or bleeding gums  ALLERY AND IMMUNOLOGIC: No hives or eczema    OBJECTIVE FINDINGS:  Vital Signs Last 24 Hrs  T(C): 36.7 (22 Aug 2024 13:20), Max: 37 (21 Aug 2024 21:07)  T(F): 98 (22 Aug 2024 13:20), Max: 98.6 (21 Aug 2024 21:07)  HR: 77 (22 Aug 2024 13:20) (77 - 87)  BP: 105/55 (22 Aug 2024 13:20) (100/59 - 120/64)  BP(mean): 68 (22 Aug 2024 03:18) (68 - 68)  RR: 18 (22 Aug 2024 13:20) (16 - 18)  SpO2: 96% (22 Aug 2024 13:20) (92% - 97%)    Parameters below as of 22 Aug 2024 13:20  Patient On (Oxygen Delivery Method): room air        PHYSICAL EXAM:  GENERAL: NAD, well-developed, thin  HEAD:  Atraumatic, Normocephalic  EYES: EOMI, PERRLA, conjunctiva and sclera clear  NECK: Supple, Normal thyroid  NERVOUS SYSTEM:  Alert & Oriented X3, Good concentration; Motor Strength 5/5 B/L upper and lower extremities  CHEST/LUNG: Clear to auscultation bilaterally; No rales, rhonchi, wheezing, or rubs  HEART: Regular rate and rhythm; No murmurs, rubs, or gallops  ABDOMEN: Soft, Nondistended. Mildly TTP suprapubically, non-discrete firmness palpated in this area; Bowel sounds present, No rebound, No guarding.   EXTREMITIES:  2+ Peripheral Pulses, No clubbing. Toes appear dusky bilaterally with poor cap refill.   LYMPH: No lymphadenopathy noted  SKIN: No rashes or lesions  PELVIC: deferred  RECTAL: deferred    LABS:                        10.5   8.47  )-----------( 617      ( 22 Aug 2024 05:53 )             33.3     08-22    131<L>  |  98  |  12.5  ----------------------------<  98  4.6   |  27.0  |  0.69    Ca    8.2<L>      22 Aug 2024 05:53  Phos  3.2     08-22  Mg     1.7     08-22    TPro  5.2<L>  /  Alb  2.1<L>  /  TBili  <0.2<L>  /  DBili  x   /  AST  12  /  ALT  6   /  AlkPhos  72  08-22      Urinalysis Basic - ( 22 Aug 2024 05:53 )    Color: x / Appearance: x / SG: x / pH: x  Gluc: 98 mg/dL / Ketone: x  / Bili: x / Urobili: x   Blood: x / Protein: x / Nitrite: x   Leuk Esterase: x / RBC: x / WBC x   Sq Epi: x / Non Sq Epi: x / Bacteria: x        RADIOLOGY & ADDITIONAL STUDIES:             < from: CT Abdomen and Pelvis w/ IV Cont (24 @ 17:54) >  FINDINGS:  LOWER CHEST: Cardiomegaly. Small pericardial effusion.    LIVER: Within normal limits.  BILE DUCTS: Normal caliber.  GALLBLADDER: Within normal limits.  SPLEEN: Within normal limits.  PANCREAS: Within normal limits.  ADRENALS: Within normal limits.  KIDNEYS/URETERS: Within normal limits. No hydronephrosis    BLADDER: Within normal limits.  REPRODUCTIVE ORGANS: Bilateral adnexal masses measuring 3.0 x 2.0 cm on   the right and 5.7 x 1.0 cm on the left.. Uterus is unremarkable.    BOWEL: No bowel obstruction. Appendix is normal.  PERITONEUM/RETROPERITONEUM: Moderate ascites. Extensive peritoneal   carcinomatosis. For reference:  *  Upper abdominal mass measures 7.1 x 4.8 cm (3:48)  *  Right mid abdominal mass measures 5.3 x 4.1 cm (3:78)  *  Lower abdominal mass measures 6.2 x 5.3 cm (3:93)    Wall thickening of the sigmoid colon, which may reflect the presence of   serosal implants. Nodularity in the cul-de-sac and along the paracolic   gutters, as well as along the bladder dome.    VESSELS: Atherosclerotic changes.  LYMPH NODES: No lymphadenopathy.  ABDOMINAL WALL: Within normal limits.  BONES: Degenerative changes.    IMPRESSION:  Bilateral adnexal masses with moderate ascites and extensive peritoneal   carcinomatosis. Findings are concerning for malignancy of gynecologic   origin.    No evidence of bowel obstruction.    < end of copied text >          Hospital Medications:   MEDICATIONS  (STANDING):  carvedilol 3.125 milliGRAM(s) Oral every 12 hours  losartan 12.5 milliGRAM(s) Oral daily  nicotine -  14 mG/24Hr(s) Patch 1 Patch Transdermal daily  spironolactone 12.5 milliGRAM(s) Oral daily    MEDICATIONS  (PRN):  acetaminophen     Tablet .. 650 milliGRAM(s) Oral every 6 hours PRN Temp greater or equal to 38C (100.4F), Mild Pain (1 - 3)  albuterol    90 MICROgram(s) HFA Inhaler 2 Puff(s) Inhalation every 6 hours PRN for shortness of breath and/or wheezing  aluminum hydroxide/magnesium hydroxide/simethicone Suspension 30 milliLiter(s) Oral every 4 hours PRN Dyspepsia  melatonin 3 milliGRAM(s) Oral at bedtime PRN Insomnia  morphine  - Injectable 2 milliGRAM(s) IV Push every 6 hours PRN Severe Pain (7 - 10)  ondansetron Injectable 4 milliGRAM(s) IV Push every 8 hours PRN Nausea and/or Vomiting  oxycodone    5 mG/acetaminophen 325 mG 1 Tablet(s) Oral every 6 hours PRN Moderate Pain (4 - 6)   GYNECOLOGIC ONCOLOGY CONSULT NOTE    70yo   (LMP age 50)  Hx:  69 F PMHx peritoneal carcinomatosis, current smoker sent to [facility] ED from cardiologist office for pericardial effusion and EF <25 on TTE. Patient was at [facility] ED in  for abdominal distension and diarrhea, was found to have bilateral adnexal masses with moderate ascites and extensive peritoneal carcinomatosis. Patient was referred to GYN ONC for concern for malignancy of gynecologic origin. At GYN patient had repeat CT that showed bilateral adnexal masses 3.0 x 2.0 cm on the right and 5.7 x 1.0 cm on the left, unremarkable uterus, moderate ascites, and extensive peritoneal carcinomatosis. 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  Patient has not started chemotherapy or radiation therapy. Went to cardiology office for evaluation prior to starting cancer treatment. TTE was performed as outpatient and reportedly found pericardial effusion, pleural effusion, and EF <25%. Patient endorses poor appetite, SOB, orthopnea, abdominal distention, constipation. Denies fever, chills, N/V/D, hematochezia, hematuria. (20 Aug 2024 22:10)    From GYN Onc perspective: Pt was seen in the office as a new patient referred from [facility] ED on . She had not seen physicians for health monitoring for over 10 years until she began to have distention and constipation over the last month. 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 IR biopsy was done showing Mullerian origin--likely ovarian. At that time the plan was Carbo AUC 5 / Taxol 175mg/m2 +/- Bevacizumab 15mg/kg D1 q21d x 3C followed by repeat imaging +/- interval debulk with HIPEC followed by at least 2-3C adjuvant treatment.     Patient seen and examined at bedside. Reports constipation, but improvement in her abdominal pain s/p paracentesis. Denies CP, SOB, dizziness, n/v, diarrhea. She reports a history of ocular migraines where she gets visual aura without headache. Reports 20 lb unintentional weight loss in last 2 years but difficult to differentiate cancer vs grief over losing her . No early satiety until her recent abdominal distention/pain.  She reports being frustrated with the heart failure diagnosis and would like to discuss what this findings means for the chemo +/- surgery plan she had discussed with Dr Martinez.     OB:  via . postmenopausal. 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 + weight loss  EYES: No eye pain, visual disturbances, or discharge  ENMT:  No difficulty hearing, tinnitus, vertigo; No sinus or throat pain  NECK: No pain or stiffness  BREASTS: No pain, masses, or nipple discharge  RESPIRATORY: No cough, wheezing, chills or hemoptysis; No shortness of breath  CARDIOVASCULAR: No chest pain, palpitations, dizziness, or leg swelling  GASTROINTESTINAL: No abdominal or epigastric pain. No nausea, vomiting, or hematemesis; No diarrhea . No melena or hematochezia. + constipation  GENITOURINARY: No dysuria, frequency, hematuria, or incontinence  NEUROLOGICAL: No headaches, memory loss, loss of strength, numbness, or tremors  SKIN: No itching, burning, rashes, or lesions   LYMPH NODES: No enlarged glands  ENDOCRINE: No heat or cold intolerance; No hair loss  MUSCULOSKELETAL: No joint pain or swelling; No muscle, back, or extremity pain  PSYCHIATRIC: No depression, anxiety, mood swings, or difficulty sleeping  HEME/LYMPH: No easy bruising, or bleeding gums  ALLERY AND IMMUNOLOGIC: No hives or eczema    OBJECTIVE FINDINGS:  Vital Signs Last 24 Hrs  T(C): 36.7 (22 Aug 2024 13:20), Max: 37 (21 Aug 2024 21:07)  T(F): 98 (22 Aug 2024 13:20), Max: 98.6 (21 Aug 2024 21:07)  HR: 77 (22 Aug 2024 13:20) (77 - 87)  BP: 105/55 (22 Aug 2024 13:20) (100/59 - 120/64)  BP(mean): 68 (22 Aug 2024 03:18) (68 - 68)  RR: 18 (22 Aug 2024 13:20) (16 - 18)  SpO2: 96% (22 Aug 2024 13:20) (92% - 97%)    Parameters below as of 22 Aug 2024 13:20  Patient On (Oxygen Delivery Method): room air        PHYSICAL EXAM:  GENERAL: NAD, well-developed, thin  HEAD:  Atraumatic, Normocephalic  EYES: EOMI, PERRLA, conjunctiva and sclera clear  NECK: Supple, Normal thyroid  NERVOUS SYSTEM:  Alert & Oriented X3, Good concentration; Motor Strength 5/5 B/L upper and lower extremities  CHEST/LUNG: Clear to auscultation bilaterally; No rales, rhonchi, wheezing, or rubs  HEART: Regular rate and rhythm; No murmurs, rubs, or gallops  ABDOMEN: Soft, Nondistended. Mildly TTP suprapubically, non-discrete firmness palpated in this area; Bowel sounds present, No rebound, No guarding.   EXTREMITIES:  2+ Peripheral Pulses, No clubbing. Toes appear dusky bilaterally with delayed cap refill.   LYMPH: No lymphadenopathy noted  SKIN: No rashes or lesions  PELVIC: deferred  RECTAL: deferred    LABS:                        10.5   8.47  )-----------( 617      ( 22 Aug 2024 05:53 )             33.3     08-22    131<L>  |  98  |  12.5  ----------------------------<  98  4.6   |  27.0  |  0.69    Ca    8.2<L>      22 Aug 2024 05:53  Phos  3.2     08-22  Mg     1.7     08-22    TPro  5.2<L>  /  Alb  2.1<L>  /  TBili  <0.2<L>  /  DBili  x   /  AST  12  /  ALT  6   /  AlkPhos  72  08-22      Urinalysis Basic - ( 22 Aug 2024 05:53 )    Color: x / Appearance: x / SG: x / pH: x  Gluc: 98 mg/dL / Ketone: x  / Bili: x / Urobili: x   Blood: x / Protein: x / Nitrite: x   Leuk Esterase: x / RBC: x / WBC x   Sq Epi: x / Non Sq Epi: x / Bacteria: x        RADIOLOGY & ADDITIONAL STUDIES:             < from: CT Abdomen and Pelvis w/ IV Cont (24 @ 17:54) >  FINDINGS:  LOWER CHEST: Cardiomegaly. Small pericardial effusion.    LIVER: Within normal limits.  BILE DUCTS: Normal caliber.  GALLBLADDER: Within normal limits.  SPLEEN: Within normal limits.  PANCREAS: Within normal limits.  ADRENALS: Within normal limits.  KIDNEYS/URETERS: Within normal limits. No hydronephrosis    BLADDER: Within normal limits.  REPRODUCTIVE ORGANS: Bilateral adnexal masses measuring 3.0 x 2.0 cm on   the right and 5.7 x 1.0 cm on the left.. Uterus is unremarkable.    BOWEL: No bowel obstruction. Appendix is normal.  PERITONEUM/RETROPERITONEUM: Moderate ascites. Extensive peritoneal   carcinomatosis. For reference:  *  Upper abdominal mass measures 7.1 x 4.8 cm (3:48)  *  Right mid abdominal mass measures 5.3 x 4.1 cm (3:78)  *  Lower abdominal mass measures 6.2 x 5.3 cm (3:93)    Wall thickening of the sigmoid colon, which may reflect the presence of   serosal implants. Nodularity in the cul-de-sac and along the paracolic   gutters, as well as along the bladder dome.    VESSELS: Atherosclerotic changes.  LYMPH NODES: No lymphadenopathy.  ABDOMINAL WALL: Within normal limits.  BONES: Degenerative changes.    IMPRESSION:  Bilateral adnexal masses with moderate ascites and extensive peritoneal   carcinomatosis. Findings are concerning for malignancy of gynecologic   origin.    No evidence of bowel obstruction.    < end of copied text >          Hospital Medications:   MEDICATIONS  (STANDING):  carvedilol 3.125 milliGRAM(s) Oral every 12 hours  losartan 12.5 milliGRAM(s) Oral daily  nicotine -  14 mG/24Hr(s) Patch 1 Patch Transdermal daily  spironolactone 12.5 milliGRAM(s) Oral daily    MEDICATIONS  (PRN):  acetaminophen     Tablet .. 650 milliGRAM(s) Oral every 6 hours PRN Temp greater or equal to 38C (100.4F), Mild Pain (1 - 3)  albuterol    90 MICROgram(s) HFA Inhaler 2 Puff(s) Inhalation every 6 hours PRN for shortness of breath and/or wheezing  aluminum hydroxide/magnesium hydroxide/simethicone Suspension 30 milliLiter(s) Oral every 4 hours PRN Dyspepsia  melatonin 3 milliGRAM(s) Oral at bedtime PRN Insomnia  morphine  - Injectable 2 milliGRAM(s) IV Push every 6 hours PRN Severe Pain (7 - 10)  ondansetron Injectable 4 milliGRAM(s) IV Push every 8 hours PRN Nausea and/or Vomiting  oxycodone    5 mG/acetaminophen 325 mG 1 Tablet(s) Oral every 6 hours PRN Moderate Pain (4 - 6)

## 2024-08-22 NOTE — DIETITIAN INITIAL EVALUATION ADULT - PERTINENT LABORATORY DATA
08-22    131<L>  |  98  |  12.5  ----------------------------<  98  4.6   |  27.0  |  0.69    Ca    8.2<L>      22 Aug 2024 05:53  Phos  3.2     08-22  Mg     1.7     08-22    TPro  5.2<L>  /  Alb  2.1<L>  /  TBili  <0.2<L>  /  DBili  x   /  AST  12  /  ALT  6   /  AlkPhos  72  08-22  A1C with Estimated Average Glucose Result: 5.8 % (08-21-24 @ 04:00)  A1C with Estimated Average Glucose Result: 5.3 % (08-05-24 @ 17:10)

## 2024-08-22 NOTE — CONSULT NOTE ADULT - CONSULT REASON
support and to assist with ongoing goals of care in the setting of newly diagnosed metastatic cancer
Pleural effusions
peritoneal carcinomatosis

## 2024-08-22 NOTE — PROGRESS NOTE ADULT - PROBLEM SELECTOR PLAN 2
- new onset HFrEF EF 25%   - Pt appears euvolemic   - GDMT: c/w coreg, losartan, and spironolactone   - Monitor on telemetry.  Strict i/o and daily weights.  Keep K > 4, Mg > 2.  Monitor renal function with ongoing diuresis.   - pt agreeable for LHC. Keep NPO after midnight tonight, for LHC on 8/23 with Dr. Dinh.

## 2024-08-22 NOTE — DIETITIAN NUTRITION RISK NOTIFICATION - TREATMENT: THE FOLLOWING DIET HAS BEEN RECOMMENDED
Diet, NPO after Midnight:      NPO Start Date: 22-Aug-2024,   NPO Start Time: 23:59 (08-22-24 @ 11:55) [Active]  Diet, NPO (08-22-24 @ 10:41) [Active]

## 2024-08-22 NOTE — DIETITIAN INITIAL EVALUATION ADULT - PERTINENT MEDS FT
MEDICATIONS  (STANDING):  carvedilol 3.125 milliGRAM(s) Oral every 12 hours  losartan 12.5 milliGRAM(s) Oral daily  nicotine -  14 mG/24Hr(s) Patch 1 Patch Transdermal daily  spironolactone 12.5 milliGRAM(s) Oral daily

## 2024-08-22 NOTE — DIETITIAN INITIAL EVALUATION ADULT - NS FNS DIET ORDER
Diet, NPO after Midnight:      NPO Start Date: 22-Aug-2024,   NPO Start Time: 23:59 (08-22-24 @ 11:55)  Diet, NPO (08-22-24 @ 10:41)

## 2024-08-23 ENCOUNTER — TRANSCRIPTION ENCOUNTER (OUTPATIENT)
Age: 69
End: 2024-08-23

## 2024-08-23 ENCOUNTER — RESULT REVIEW (OUTPATIENT)
Age: 69
End: 2024-08-23

## 2024-08-23 DIAGNOSIS — I50.21 ACUTE SYSTOLIC (CONGESTIVE) HEART FAILURE: ICD-10-CM

## 2024-08-23 LAB
ALBUMIN SERPL ELPH-MCNC: 2.3 G/DL — LOW (ref 3.3–5.2)
ALP SERPL-CCNC: 78 U/L — SIGNIFICANT CHANGE UP (ref 40–120)
ALT FLD-CCNC: 7 U/L — SIGNIFICANT CHANGE UP
ANION GAP SERPL CALC-SCNC: 9 MMOL/L — SIGNIFICANT CHANGE UP (ref 5–17)
AST SERPL-CCNC: 13 U/L — SIGNIFICANT CHANGE UP
BILIRUB SERPL-MCNC: <0.2 MG/DL — LOW (ref 0.4–2)
BUN SERPL-MCNC: 11.5 MG/DL — SIGNIFICANT CHANGE UP (ref 8–20)
CALCIUM SERPL-MCNC: 8.1 MG/DL — LOW (ref 8.4–10.5)
CHLORIDE SERPL-SCNC: 100 MMOL/L — SIGNIFICANT CHANGE UP (ref 96–108)
CO2 SERPL-SCNC: 28 MMOL/L — SIGNIFICANT CHANGE UP (ref 22–29)
CREAT SERPL-MCNC: 0.64 MG/DL — SIGNIFICANT CHANGE UP (ref 0.5–1.3)
EGFR: 94 ML/MIN/1.73M2 — SIGNIFICANT CHANGE UP
GLUCOSE SERPL-MCNC: 103 MG/DL — HIGH (ref 70–99)
HCT VFR BLD CALC: 34.6 % — SIGNIFICANT CHANGE UP (ref 34.5–45)
HGB BLD-MCNC: 10.9 G/DL — LOW (ref 11.5–15.5)
MCHC RBC-ENTMCNC: 29 PG — SIGNIFICANT CHANGE UP (ref 27–34)
MCHC RBC-ENTMCNC: 31.5 GM/DL — LOW (ref 32–36)
MCV RBC AUTO: 92 FL — SIGNIFICANT CHANGE UP (ref 80–100)
PLATELET # BLD AUTO: 668 K/UL — HIGH (ref 150–400)
POTASSIUM SERPL-MCNC: 5.1 MMOL/L — SIGNIFICANT CHANGE UP (ref 3.5–5.3)
POTASSIUM SERPL-SCNC: 5.1 MMOL/L — SIGNIFICANT CHANGE UP (ref 3.5–5.3)
PROT SERPL-MCNC: 5.3 G/DL — LOW (ref 6.6–8.7)
RBC # BLD: 3.76 M/UL — LOW (ref 3.8–5.2)
RBC # FLD: 12.8 % — SIGNIFICANT CHANGE UP (ref 10.3–14.5)
SODIUM SERPL-SCNC: 137 MMOL/L — SIGNIFICANT CHANGE UP (ref 135–145)
WBC # BLD: 8.53 K/UL — SIGNIFICANT CHANGE UP (ref 3.8–10.5)
WBC # FLD AUTO: 8.53 K/UL — SIGNIFICANT CHANGE UP (ref 3.8–10.5)

## 2024-08-23 PROCEDURE — 99152 MOD SED SAME PHYS/QHP 5/>YRS: CPT

## 2024-08-23 PROCEDURE — 99232 SBSQ HOSP IP/OBS MODERATE 35: CPT

## 2024-08-23 PROCEDURE — 99232 SBSQ HOSP IP/OBS MODERATE 35: CPT | Mod: 57

## 2024-08-23 PROCEDURE — 93458 L HRT ARTERY/VENTRICLE ANGIO: CPT | Mod: 26

## 2024-08-23 PROCEDURE — 93320 DOPPLER ECHO COMPLETE: CPT | Mod: 26

## 2024-08-23 PROCEDURE — 93312 ECHO TRANSESOPHAGEAL: CPT | Mod: 26

## 2024-08-23 PROCEDURE — 93325 DOPPLER ECHO COLOR FLOW MAPG: CPT | Mod: 26

## 2024-08-23 PROCEDURE — 99233 SBSQ HOSP IP/OBS HIGH 50: CPT

## 2024-08-23 RX ORDER — PACLITAXEL 6 MG/ML
270 INJECTION, SOLUTION INTRAVENOUS ONCE
Refills: 0 | Status: COMPLETED | OUTPATIENT
Start: 2024-08-24 | End: 2024-08-24

## 2024-08-23 RX ORDER — DEXAMETHASONE 0.75 MG
20 TABLET ORAL ONCE
Refills: 0 | Status: COMPLETED | OUTPATIENT
Start: 2024-08-24 | End: 2024-08-24

## 2024-08-23 RX ORDER — CARBOPLATIN 10 MG/ML
420 INJECTION, SOLUTION INTRAVENOUS ONCE
Refills: 0 | Status: COMPLETED | OUTPATIENT
Start: 2024-08-24 | End: 2024-08-24

## 2024-08-23 RX ORDER — DIPHENHYDRAMINE HCL 50 MG
50 CAPSULE ORAL ONCE
Refills: 0 | Status: COMPLETED | OUTPATIENT
Start: 2024-08-24 | End: 2024-08-24

## 2024-08-23 RX ORDER — PALONOSETRON HYDROCHLORIDE 0.25 MG/5ML
0.25 INJECTION INTRAVENOUS ONCE
Refills: 0 | Status: COMPLETED | OUTPATIENT
Start: 2024-08-24 | End: 2024-08-24

## 2024-08-23 RX ORDER — ASPIRIN 81 MG
81 TABLET, DELAYED RELEASE (ENTERIC COATED) ORAL ONCE
Refills: 0 | Status: COMPLETED | OUTPATIENT
Start: 2024-08-23 | End: 2024-08-23

## 2024-08-23 RX ORDER — FAMOTIDINE 10 MG/ML
20 INJECTION INTRAVENOUS ONCE
Refills: 0 | Status: COMPLETED | OUTPATIENT
Start: 2024-08-24 | End: 2024-08-24

## 2024-08-23 RX ADMIN — Medication 1 PATCH: at 13:31

## 2024-08-23 RX ADMIN — Medication 1 PATCH: at 07:50

## 2024-08-23 RX ADMIN — Medication 1 PATCH: at 13:30

## 2024-08-23 RX ADMIN — Medication 81 MILLIGRAM(S): at 09:25

## 2024-08-23 RX ADMIN — Medication 1 PATCH: at 20:05

## 2024-08-23 NOTE — DISCHARGE NOTE PROVIDER - NSDCMRMEDTOKEN_GEN_ALL_CORE_FT
albuterol 90 mcg/inh inhalation aerosol: 2 puff(s) inhaled every 6 hours as needed for  shortness of breath and/or wheezing  cholecalciferol 25 mcg (1000 intl units) oral tablet: 1 tab(s) orally  Motrin 400 mg oral tablet: 1 tab(s) orally 2 times a day as needed for  pain  Tylenol 325 mg oral tablet: 2 tab(s) orally every 6 hours as needed for  pain   albuterol 90 mcg/inh inhalation aerosol: 2 puff(s) inhaled every 6 hours as needed for  shortness of breath and/or wheezing  carvedilol 6.25 mg oral tablet: 1 tab(s) orally every 12 hours  cholecalciferol 25 mcg (1000 intl units) oral tablet: 1 tab(s) orally  losartan 25 mg oral tablet: 0.5 tab(s) orally once a day  spironolactone 25 mg oral tablet: 0.5 tab(s) orally once a day  Tylenol 325 mg oral tablet: 2 tab(s) orally every 6 hours as needed for  pain

## 2024-08-23 NOTE — PROGRESS NOTE ADULT - PROBLEM/PLAN-3
Patient:   Faisal Amador Jr            MRN: [id number]            FIN: [id number]               Age:   63 years     Sex:  Male     :  1958   Associated Diagnoses:   None   Author:   Kaylyn Chicas      Health Status   The H&P was reviewed, the patient was examined, and there are no changes to the patient's condition..
DISPLAY PLAN FREE TEXT

## 2024-08-23 NOTE — PROGRESS NOTE ADULT - SUBJECTIVE AND OBJECTIVE BOX
Patient is a 69y old  Female who presents with a chief complaint of Cardiomyopathy (23 Aug 2024 11:46)      SUBJECTIVE / OVERNIGHT EVENTS: Feels ok. No events overnight.     MEDICATIONS  (STANDING):  carvedilol 3.125 milliGRAM(s) Oral every 12 hours  losartan 12.5 milliGRAM(s) Oral daily  nicotine -  14 mG/24Hr(s) Patch 1 Patch Transdermal daily  spironolactone 12.5 milliGRAM(s) Oral daily    MEDICATIONS  (PRN):  acetaminophen     Tablet .. 650 milliGRAM(s) Oral every 6 hours PRN Temp greater or equal to 38C (100.4F), Mild Pain (1 - 3)  albuterol    90 MICROgram(s) HFA Inhaler 2 Puff(s) Inhalation every 6 hours PRN for shortness of breath and/or wheezing  aluminum hydroxide/magnesium hydroxide/simethicone Suspension 30 milliLiter(s) Oral every 4 hours PRN Dyspepsia  melatonin 3 milliGRAM(s) Oral at bedtime PRN Insomnia  morphine  - Injectable 2 milliGRAM(s) IV Push every 6 hours PRN Severe Pain (7 - 10)  ondansetron Injectable 4 milliGRAM(s) IV Push every 8 hours PRN Nausea and/or Vomiting  oxycodone    5 mG/acetaminophen 325 mG 1 Tablet(s) Oral every 6 hours PRN Moderate Pain (4 - 6)        I&O's Summary    22 Aug 2024 07:01  -  23 Aug 2024 07:00  --------------------------------------------------------  IN: 780 mL / OUT: 0 mL / NET: 780 mL        PHYSICAL EXAM:  Vital Signs Last 24 Hrs  T(C): 36.7 (23 Aug 2024 13:24), Max: 36.8 (22 Aug 2024 17:32)  T(F): 98.1 (23 Aug 2024 13:24), Max: 98.2 (22 Aug 2024 17:32)  HR: 93 (23 Aug 2024 13:24) (71 - 98)  BP: 107/55 (23 Aug 2024 13:24) (92/53 - 138/70)  BP(mean): 93 (22 Aug 2024 21:07) (93 - 93)  RR: 18 (23 Aug 2024 13:24) (16 - 22)  SpO2: 95% (23 Aug 2024 13:24) (92% - 100%)    Parameters below as of 23 Aug 2024 13:24  Patient On (Oxygen Delivery Method): room air        CONSTITUTIONAL: NAD, frail appearing, elderly female  ENMT: Moist oral mucosa, no pharyngeal injection or exudates; normal dentition  RESPIRATORY: Normal respiratory effort; lungs are clear to auscultation bilaterally  CARDIOVASCULAR: Regular rate and rhythm, normal S1 and S2, no murmur/rub/gallop; 2+ lower extremity edema; Peripheral pulses are 2+ bilaterally  ABDOMEN: Abdo nontender to palpation, normoactive bowel sounds, no rebound/guarding  MUSCLOSKELETAL:  Normal gait; no clubbing or cyanosis of digits; no joint swelling or tenderness to palpation  PSYCH: A+O to person, place, and time; affect appropriate  NEUROLOGY: CN 2-12 are intact and symmetric; no gross sensory deficits;   SKIN: No rashes; no palpable lesions    LABS:                        10.9   8.53  )-----------( 668      ( 23 Aug 2024 05:47 )             34.6     08-23    137  |  100  |  11.5  ----------------------------<  103<H>  5.1   |  28.0  |  0.64    Ca    8.1<L>      23 Aug 2024 05:47  Phos  3.2     08-22  Mg     1.7     08-22    TPro  5.3<L>  /  Alb  2.3<L>  /  TBili  <0.2<L>  /  DBili  x   /  AST  13  /  ALT  7   /  AlkPhos  78  08-23          Urinalysis Basic - ( 23 Aug 2024 05:47 )    Color: x / Appearance: x / SG: x / pH: x  Gluc: 103 mg/dL / Ketone: x  / Bili: x / Urobili: x   Blood: x / Protein: x / Nitrite: x   Leuk Esterase: x / RBC: x / WBC x   Sq Epi: x / Non Sq Epi: x / Bacteria: x        Culture - Body Fluid with Gram Stain (collected 21 Aug 2024 10:51)  Source: Paracentesis Paracentesis Fluid  Gram Stain (21 Aug 2024 23:40):    No polymorphonuclear cells seen per low power field    No organisms seen per oil power field  Preliminary Report (22 Aug 2024 15:17):    No growth      CAPILLARY BLOOD GLUCOSE            RADIOLOGY & ADDITIONAL TESTS:  Results Reviewed:   Imaging Personally Reviewed:  Electrocardiogram Personally Reviewed:       Complex Repair And Rotation Flap Text: The defect edges were debeveled with a #15 scalpel blade.  The primary defect was closed partially with a complex linear closure.  Given the location of the remaining defect, shape of the defect and the proximity to free margins a rotation flap was deemed most appropriate for complete closure of the defect.  Using a sterile surgical marker, an appropriate advancement flap was drawn incorporating the defect and placing the expected incisions within the relaxed skin tension lines where possible. The area thus outlined was incised deep to adipose tissue with a #15 scalpel blade. The skin margins were undermined to an appropriate distance in all directions utilizing iris scissors and carried over to close the primary defect.

## 2024-08-23 NOTE — DISCHARGE NOTE PROVIDER - NSDCCPTREATMENT_GEN_ALL_CORE_FT
PRINCIPAL PROCEDURE  Procedure: Left heart cardiac cath  Findings and Treatment: Restricted use with no heavy lifting of affected arm for 48 hours.  No submerging the arm in water for 48 hours.  You may start showering today.  Call your doctor for any bleeding, swelling, loss of sensation in the hand or fingers, or fingers turning blue.  If heavy bleeding or large lumps form, hold pressure at the spot and come to the Emergency Room.

## 2024-08-23 NOTE — DISCHARGE NOTE PROVIDER - CARE PROVIDER_API CALL
Gildardo Dinh  Interventional Cardiology  [address] [place] [id number]  Phone: [phone]  Fax: [phone]  Follow Up Time: 2 weeks   Gildardo Dinh  Interventional Cardiology  [address] [place] [address]  Phone: [phone]  Fax: [phone]  Follow Up Time: 2 weeks    Huma Jennings  Gynecologic Oncology  [address] [place] [address]  Phone: [phone]  Fax: [phone]  Follow Up Time: 1 week

## 2024-08-23 NOTE — PROGRESS NOTE ADULT - ASSESSMENT
Risk Assessments:  ASA:  Mallampati:  Creat:  GFR:   BRA:  Pt assessed for sedation, and educated regarding the plan for Versed/Fentanyl as needed     Indication:     Coronary Anatomy:     Plan:    -plan for *** via ***  -preferred access:   -confirmed appropriate NPO duration  -ECG and Labs reviewed  -Aspirin 81mg po pre-cath  -NS 0.9% 250ml/hr x 1 bolus; pre procedure NAIF ppx   -procedure discussed with patient; risks and benefits explained, questions answered  -consent obtained by attending IC   Risk Assessments:  ASA: 3  Mallampati: 2   Creat: 0.64  GFR: 94  BRA: 4.0%  Pt assessed for sedation, and educated regarding the plan for Versed/Fentanyl as needed     Indication:     Coronary Anatomy:     Plan:    -plan for MARYAM followed by [facility]   -preferred access: RRA   -confirmed appropriate NPO duration  -ECG and Labs reviewed  -Aspirin 81mg po pre-cath  -NS 0.9% 250ml/hr x 1 bolus; pre procedure NAIF ppx   -procedure discussed with patient; risks and benefits explained, questions answered  -consent obtained by attending IC

## 2024-08-23 NOTE — CHART NOTE - NSCHARTNOTEFT_GEN_A_CORE
68 y/o  female with recent Dx of ovarian CA< at least Stage III, c/b new Dx of HFeEF.    Patient s/p MARYAM/LHC with findings of NICM and plan for continued medical management.  Patient is candidate for inpatient chemotherapy  as planned chemotherapy regimen is not cardiotoxic.   Plan for chemo tomorrow, 8/24 and followed by 6 cycles q21 days.    Seen at bedside with Dr. Martinez

## 2024-08-23 NOTE — PROGRESS NOTE ADULT - PROBLEM SELECTOR PLAN 2
- TTE revealed linear mobile echodensity attached to the subvalvular apparatus measuring approximately 1.5 cm in length, this could represent a small fragment of calcified partially ruptured chorda tendinae, vs. less likely a small old vegetation.  -S/p MARYAM today with ruptured chordae that's calcified

## 2024-08-23 NOTE — DISCHARGE NOTE PROVIDER - NSDCCPCAREPLAN_GEN_ALL_CORE_FT
PRINCIPAL DISCHARGE DIAGNOSIS  Diagnosis: Congestive heart failure with cardiomyopathy  Assessment and Plan of Treatment:       SECONDARY DISCHARGE DIAGNOSES  Diagnosis: NICM (nonischemic cardiomyopathy)  Assessment and Plan of Treatment:      PRINCIPAL DISCHARGE DIAGNOSIS  Diagnosis: NICM (nonischemic cardiomyopathy)  Assessment and Plan of Treatment: TTE with low EF 25-30%, linear mobile echo attached to subvalvular apparatus approx 1.5cm in length, could be partially ruptured chorda tendinae vs less likely a small old vegetation. LCH with NICM To continue GMDT:  spironolactone, losartan, coreg. Loop diuretics held in the setting of pericardial effusion. Status post MARYAM: EF of 25-30% ;  small mobile echodensity attached to the papillary muscle which represents a calcified ruptured chordae. Mild non-mobile focal atheroma which measures up to 2.30 mm in the visualized portions of the descending aorta.; Small pericardial effusion noted adjacent to the right atrium, small pericardial effusion in the transverse sinus adjacent to the BLAKE and small to moderate sized pericardial effusion noted adjacent to the posterolateral left ventricle with no evidence of hemodynamic compromise (or echocardiographic evidence of cardiac tamponade). 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 Status post  paracentesis via IR w/ omental biopsy showing malignant ascites. Multiple CT show adnexal masses and peritoneal carcinomatoses. Status post initiation of chemotherapy on 8/24; Q21 day cycle. to follow up with gyn/onc as outpatient

## 2024-08-23 NOTE — DISCHARGE NOTE PROVIDER - HOSPITAL COURSE
Brief Hospital Course:70 y/o female active tobacco user with with new dx of b/l adnexal masses and extensive peritoneal carcinomatosis c/f at least Stage BARBARA ovarian vs. primary peritoneal  who was referred from office with mild sob found to have a new cardiomyopathy and bilateral effusion. Now s/p LHC demonstrating normal cors with LVEDP 10.        At the time of discharge patient was hemodynamically stable and amenable to all terms of discharge. The patient has received verbal instructions from myself regarding discharge plans.     Length of Discharge: 45MIN    Patient is medically stable and cleared for discharge to home with outpatient follow up.     Brief Hospital Course:68 y/o female active tobacco user with with new dx of b/l adnexal masses and extensive peritoneal carcinomatosis c/f at least Stage BARBARA ovarian vs. primary peritoneal  who was referred from office with mild sob found to have a new cardiomyopathy and bilateral effusion. Now s/p LHC demonstrating normal cors with LVEDP 10. Gyn oncology following for possible inpatient chemo.        At the time of discharge patient was hemodynamically stable and amenable to all terms of discharge. The patient has received verbal instructions from myself regarding discharge plans.    Patient is medically stable and cleared for discharge to home with outpatient follow up.     69 F PMHx peritoneal carcinoma, current smoker sent to [facility] ED from cardiologist office for new cardiomyopathy with reported EF <25 on TTE, also w/ reported pericardial effusion. S/p Paracentesis of 5L. Found to have linear mobile echodensity approx 1.5cm in length. Cardiology was consulted, Chest X-ray showed cardiomegaly and small left sided pleural effusion. ECG showed ST w/ PVCs similar to previous ECG from 8/5. TTE with low EF 25-30%, linear mobile echo attached to subvalvular apparatus approx 1.5cm in length, could be partially ruptured chorda tendinae vs less likely a small old vegetation. LCH with NICM To continue GMDT:  spironolactone, losartan, coreg. Loop diuretics held in the setting of pericardial effusion. Status post MARYAM: EF of 25-30% ;  small mobile echodensity attached to the papillary muscle which represents a calcified ruptured chordae. Mild non-mobile focal atheroma which measures up to 2.30 mm in the visualized portions of the descending aorta.; Small pericardial effusion noted adjacent to the right atrium, small pericardial effusion in the transverse sinus adjacent to the BLAKE and small to moderate sized pericardial effusion noted adjacent to the posterolateral left ventricle with no evidence of hemodynamic compromise (or echocardiographic evidence of cardiac tamponade). 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 Status post  paracentesis via IR w/ omental biopsy showing malignant ascites. Multiple CT show adnexal masses and peritoneal carcinomatoses. Status post initiation of chemotherapy on 8/24; Q21 day cycle. to follow up with gyn/onc as outpatient

## 2024-08-23 NOTE — PROGRESS NOTE ADULT - ASSESSMENT
68 y/o female active tobacco user with with new dx of b/l adnexal masses and extensive peritoneal carcinomatosis c/f at least Stage BARBARA ovarian vs. primary peritoneal  who was referred from office with mild sob found to have a new cardiomyopathy and bilateral effusion.

## 2024-08-23 NOTE — CHART NOTE - NSCHARTNOTEFT_GEN_A_CORE
Now s/p LHC via RRA with Dr. Dinh, tolerated procedure well. Pt arrived to recovery in NAD and HDS, access site stable, no bleed/hematoma, distal pulse +,   Intraprocedurally:   Medications:   Fentanyl: 25mcg  Versed: 1mg  Heparin: 3,000 units   Omnipaque: 28ml  Closure device: Radial band       -NICM likely stress induced 2/2 malignant process   -post cardiac cath orders  -radial precautions  -bedrest x hours post procedure  -labs and EKG in am  -GDMT as tolerated limited with low blood pressure   -beta blocker: coreg 3.125mg BID and losartan 12.5mg daily   -[facility] following during hospitalization  -Lifestyle modifications discussed to reduce cardiovascular risk factors including weight reduction, smoking cessation, medication compliance, and routine follow up with Cardiologist to track your BMI, cholesterol, and glucose levels.   -Management per Hospitalist

## 2024-08-23 NOTE — PROGRESS NOTE ADULT - SUBJECTIVE AND OBJECTIVE BOX
Department of Cardiology                                                                  [facility]/[place] [address]                                                            Telephone: [phone]. Fax:[phone]                                                                                      Cardiac Cath NP Note           Patient is a 69y old  Female who presents with a chief complaint of Cardiomyopathy (22 Aug 2024 14:35)    Cardiology consulting for ....  Overnight events:   Plan:     HPI:  69 F PMHx peritoneal carcinoma, current smoker sent to [facility] ED from cardiologist office for pericardial effusion and EF <25 on TTE. Patient was at [facility] ED in  for abdominal distension and diarrhea, was found to have bilateral adnexal masses with moderate ascites and extensive peritoneal carcinomatosis. Patient was referred to GYN ONC for concern for malignancy of gynecologic origin. At GYN patient had repeat CT that showed bilateral adnexal masses 3.0 x 2.0 cm on the right and 5.7 x 1.0 cm on the left, unremarkable uterus, moderate ascites, and extensive peritoneal carcinomatosis. 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  Patient has not started chemotherapy or radiation therapy. Went to cardiology office for ?evaluation prior to starting cancer treatment. TTE was performed as outpatient and reportedly found pericardial effusion, pleural effusion, and EF <25%. Patient endorses poor appetite, SOB, orthopnea, abdominal distention, constipation. Denies fever, chills, N/V/D, hematochezia, hematuria. (20 Aug 2024 22:10)      PAST MEDICAL & SURGICAL HISTORY:  Abdominal mass      Adnexal mass      Peritoneal carcinoma      Abdominal pain      Vaginal delivery      No significant past surgical history          RADIOLOGY & ADDITIONAL STUDIES/DIAGNOSTIC TESTING:      ECHO  FINDINGS:    STRESS  FINDINGS:    CATHETERIZATION  FINDINGS:      Allergies    No Known Allergies    Intolerances        MEDICATIONS  (STANDING):  carvedilol 3.125 milliGRAM(s) Oral every 12 hours  losartan 12.5 milliGRAM(s) Oral daily  nicotine -  14 mG/24Hr(s) Patch 1 Patch Transdermal daily  spironolactone 12.5 milliGRAM(s) Oral daily    MEDICATIONS  (PRN):  acetaminophen     Tablet .. 650 milliGRAM(s) Oral every 6 hours PRN Temp greater or equal to 38C (100.4F), Mild Pain (1 - 3)  albuterol    90 MICROgram(s) HFA Inhaler 2 Puff(s) Inhalation every 6 hours PRN for shortness of breath and/or wheezing  aluminum hydroxide/magnesium hydroxide/simethicone Suspension 30 milliLiter(s) Oral every 4 hours PRN Dyspepsia  melatonin 3 milliGRAM(s) Oral at bedtime PRN Insomnia  morphine  - Injectable 2 milliGRAM(s) IV Push every 6 hours PRN Severe Pain (7 - 10)  ondansetron Injectable 4 milliGRAM(s) IV Push every 8 hours PRN Nausea and/or Vomiting  oxycodone    5 mG/acetaminophen 325 mG 1 Tablet(s) Oral every 6 hours PRN Moderate Pain (4 - 6)      FAMILY HISTORY:  FH: pancreatic cancer (Sibling)    FHx: lung cancer (Sibling)    FHx: breast cancer (Mother)        SOCIAL HISTORY:    CIGARETTES:    ALCOHOL:    REVIEW OF SYSTEMS:  General: No fevers/chills, or fatigue  HEENT: No visual disturbances, no hearing loss, no headaches, no epistaxis.  CV: No chest pain,no palpitations, no edema, no orthopnea, no PND, or GUZMÁN  Respiratory: No dyspnea, no wheeze, no cough.  GI: no nausea/vomiting, no black/bloody stools.  : No hematuria  Musculoskeletal: No myalgias, no arthralgias, no back pain.    Vital Signs Last 24 Hrs  T(C): 36.5 (23 Aug 2024 08:24), Max: 36.8 (22 Aug 2024 17:32)  T(F): 97.7 (23 Aug 2024 08:24), Max: 98.2 (22 Aug 2024 17:32)  HR: 90 (23 Aug 2024 08:24) (71 - 98)  BP: 116/59 (23 Aug 2024 08:24) (101/56 - 138/70)  BP(mean): 93 (22 Aug 2024 21:07) (93 - 93)  RR: 21 (23 Aug 2024 08:24) (16 - 21)  SpO2: 100% (23 Aug 2024 08:24) (95% - 100%)    Parameters below as of 23 Aug 2024 08:24  Patient On (Oxygen Delivery Method): room air        Daily     Daily Weight in k.3 (23 Aug 2024 05:02)    I&O's Detail    22 Aug 2024 07:01  -  23 Aug 2024 07:00  --------------------------------------------------------  IN:    Oral Fluid: 780 mL  Total IN: 780 mL    OUT:  Total OUT: 0 mL    Total NET: 780 mL          PHYSICAL EXAM:   GENERAL: Pt lying comfortably, NAD.  ENMT: PERRL, +EOMI.  NECK: soft, Supple, No JVD,   CHEST/LUNG: Clear to auscultatation bilaterally; No wheezing.  HEART: S1S2+, Regular rate and rhythm; No murmurs.  ABDOMEN: Soft, Nontender, Nondistended; Bowel sounds present.  MUSCULOSKELETAL: Normal range of motion.  SKIN: No rashes or lesions.  NEURO: AAOX3, no focal deficits, no motor r sensory loss.  PSYCH: normal mood.  VASCULAR:   Radial +2 R/+2 L  Femoral +2 R/+2 L  PT +2 R/+2 L  DP +2 R/+2 L      INTERPRETATION OF TELEMETRY:    ECG:    LABS:                        10.9   8.53  )-----------( 668      ( 23 Aug 2024 05:47 )             34.6     08    137  |  100  |  11.5  ----------------------------<  103<H>  5.1   |  28.0  |  0.64    Ca    8.1<L>      23 Aug 2024 05:47  Phos  3.2       Mg     1.7         TPro  5.3<L>  /  Alb  2.3<L>  /  TBili  <0.2<L>  /  DBili  x   /  AST  13  /  ALT  7   /  AlkPhos  78            Urinalysis Basic - ( 23 Aug 2024 05:47 )    Color: x / Appearance: x / SG: x / pH: x  Gluc: 103 mg/dL / Ketone: x  / Bili: x / Urobili: x   Blood: x / Protein: x / Nitrite: x   Leuk Esterase: x / RBC: x / WBC x   Sq Epi: x / Non Sq Epi: x / Bacteria: x      I&O's Summary    22 Aug 2024 07:01  -  23 Aug 2024 07:00  --------------------------------------------------------  IN: 780 mL / OUT: 0 mL / NET: 780 mL      Pro-Brain Natriuretic Peptide (08.20.24 @ 12:05)    Pro-Brain Natriuretic Peptide: 2071 pg/mL                                                                                     Department of Cardiology                                                                  [facility]/[place] [address]                                                            Telephone: [phone]. Fax:[phone]                                                                                      Cardiac Cath NP Note           Patient is a 69y old  Female who presents with a chief complaint of Cardiomyopathy (22 Aug 2024 14:35)    Cardiology consulting for New HFrEF, echodensity of MV, rule out vegetation, eval for CAD    Overnight events: none   Plan: MARYAM/LHC     HPI:  69 F PMHx peritoneal carcinoma, current smoker sent to [facility] ED from cardiologist office for pericardial effusion and EF <25 on TTE. Patient was at [facility] ED in  for abdominal distension and diarrhea, was found to have bilateral adnexal masses with moderate ascites and extensive peritoneal carcinomatosis. Patient was referred to GYN ONC for concern for malignancy of gynecologic origin. At GYN patient had repeat CT that showed bilateral adnexal masses 3.0 x 2.0 cm on the right and 5.7 x 1.0 cm on the left, unremarkable uterus, moderate ascites, and extensive peritoneal carcinomatosis. 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  Patient has not started chemotherapy or radiation therapy. Went to cardiology office for ?evaluation prior to starting cancer treatment. TTE was performed as outpatient and reportedly found pericardial effusion, pleural effusion, and EF <25%. Patient endorses poor appetite, SOB, orthopnea, abdominal distention, constipation. Denies fever, chills, N/V/D, hematochezia, hematuria. (20 Aug 2024 22:10)      PAST MEDICAL & SURGICAL HISTORY:  Abdominal mass  Adnexal mass  Peritoneal carcinoma  Abdominal pain  Vaginal delivery  No significant past surgical history      RADIOLOGY & ADDITIONAL STUDIES/DIAGNOSTIC TESTING:      ECHO  FINDINGS:  < from: TTE W or WO Ultrasound Enhancing Agent (24 @ 10:31) >  CONCLUSIONS:     1. Left ventricular cavity is mildly dilated. Left ventricular wall thickness is normal. Left ventricular systolic function is severely decreased with an ejection fraction of 32 % by Babcock's method of disks with an ejection fraction visually estimated at 25 to 30 %. Global left ventricular hypokinesis.   2. Normal right ventricular cavity size, with normal wall thickness, and normal right ventricular systolic function.  3. There is mild calcification of the mitral valve annulus. There is mild leaflet calcification. There is moderate mitral regurgitation. There is a linear mobile echodensity attached to the subvalvular apparatus measuring approximately 1.5 cm in length, this could represent a small fragment of calcified partially ruptured chorda tendinae, vs. less likely a small old vegetation. Consider MARYAM if clinically indicated.  4. There is a trace pericardial effusion noted adjacent to the posterolateral left ventricle, a small pericardial effusion noted adjacent to the anterior right ventricle and a small pericardial effusion noted adjacent to the right atrium. No echocardiographic evidence of tamponade.    < end of copied text >      Allergies    No Known Allergies    Intolerances        MEDICATIONS  (STANDING):  carvedilol 3.125 milliGRAM(s) Oral every 12 hours  losartan 12.5 milliGRAM(s) Oral daily  nicotine -  14 mG/24Hr(s) Patch 1 Patch Transdermal daily  spironolactone 12.5 milliGRAM(s) Oral daily    MEDICATIONS  (PRN):  acetaminophen     Tablet .. 650 milliGRAM(s) Oral every 6 hours PRN Temp greater or equal to 38C (100.4F), Mild Pain (1 - 3)  albuterol    90 MICROgram(s) HFA Inhaler 2 Puff(s) Inhalation every 6 hours PRN for shortness of breath and/or wheezing  aluminum hydroxide/magnesium hydroxide/simethicone Suspension 30 milliLiter(s) Oral every 4 hours PRN Dyspepsia  melatonin 3 milliGRAM(s) Oral at bedtime PRN Insomnia  morphine  - Injectable 2 milliGRAM(s) IV Push every 6 hours PRN Severe Pain (7 - 10)  ondansetron Injectable 4 milliGRAM(s) IV Push every 8 hours PRN Nausea and/or Vomiting  oxycodone    5 mG/acetaminophen 325 mG 1 Tablet(s) Oral every 6 hours PRN Moderate Pain (4 - 6)      FAMILY HISTORY:  FH: pancreatic cancer (Sibling)    FHx: lung cancer (Sibling)    FHx: breast cancer (Mother)        SOCIAL HISTORY:  lives alone, still works in clerical     CIGARETTES: 1/2 pk daily current everyday smoker>40 years     ALCOHOL: socially     REVIEW OF SYSTEMS:  General: ++Fatigue   HEENT: No visual disturbances, no hearing loss, no headaches, no epistaxis.  CV: No chest pain, no palpitations, no edema, no orthopnea, no PND, ++SOB   Respiratory: No dyspnea, no wheeze, no cough.  GI: +nausea, abdominal bloating   : No hematuria  Musculoskeletal: No myalgias, no arthralgias, no back pain.    Vital Signs Last 24 Hrs  T(C): 36.5 (23 Aug 2024 08:24), Max: 36.8 (22 Aug 2024 17:32)  T(F): 97.7 (23 Aug 2024 08:24), Max: 98.2 (22 Aug 2024 17:32)  HR: 90 (23 Aug 2024 08:24) (71 - 98)  BP: 116/59 (23 Aug 2024 08:24) (101/56 - 138/70)  BP(mean): 93 (22 Aug 2024 21:07) (93 - 93)  RR: 21 (23 Aug 2024 08:24) (16 - 21)  SpO2: 100% (23 Aug 2024 08:24) (95% - 100%)    Parameters below as of 23 Aug 2024 08:24  Patient On (Oxygen Delivery Method): room air        Daily     Daily Weight in k.3 (23 Aug 2024 05:02)    I&O's Detail    22 Aug 2024 07:01  -  23 Aug 2024 07:00  --------------------------------------------------------  IN:    Oral Fluid: 780 mL  Total IN: 780 mL    OUT:  Total OUT: 0 mL    Total NET: 780 mL          PHYSICAL EXAM:   GENERAL: Pt lying comfortably, NAD.  ENMT: PERRL, +EOMI.  NECK: soft, Supple, No JVD,   CHEST/LUNG: Clear to auscultatation bilaterally; No wheezing.  HEART: S1S2+, Regular rate and rhythm; No murmurs.  ABDOMEN: Soft, Nontender, Nondistended; largely distended with palpable adnexal masses   MUSCULOSKELETAL: Normal range of motion.  SKIN: No rashes or lesions.  NEURO: AAOX3, no focal deficits, no motor r sensory loss.  PSYCH: normal mood.  VASCULAR:   Radial +2 R/+2 L  Femoral +2 R/+2 L  PT +2 R/+2 L  DP +2 R/+2 L      INTERPRETATION OF TELEMETRY: SR/ST     EC bpm Sinus tachycardia with occasional Premature ventricular complexes Possible Left atrial enlargement Septal infarct ST depression, consider subendocardial injury in lateral leads    LABS:                        10.9   8.53  )-----------( 668      ( 23 Aug 2024 05:47 )             34.6     08    137  |  100  |  11.5  ----------------------------<  103<H>  5.1   |  28.0  |  0.64    Ca    8.1<L>      23 Aug 2024 05:47  Phos  3.2     08  Mg     1.7         TPro  5.3<L>  /  Alb  2.3<L>  /  TBili  <0.2<L>  /  DBili  x   /  AST  13  /  ALT  7   /  AlkPhos  78            Urinalysis Basic - ( 23 Aug 2024 05:47 )    Color: x / Appearance: x / SG: x / pH: x  Gluc: 103 mg/dL / Ketone: x  / Bili: x / Urobili: x   Blood: x / Protein: x / Nitrite: x   Leuk Esterase: x / RBC: x / WBC x   Sq Epi: x / Non Sq Epi: x / Bacteria: x      I&O's Summary    22 Aug 2024 07:01  -  23 Aug 2024 07:00  --------------------------------------------------------  IN: 780 mL / OUT: 0 mL / NET: 780 mL      Pro-Brain Natriuretic Peptide (24 @ 12:05)    Pro-Brain Natriuretic Peptide: 2071 pg/mL

## 2024-08-23 NOTE — PROGRESS NOTE ADULT - SUBJECTIVE AND OBJECTIVE BOX
[facility]                                                         CARDIOLOGY AT [facility]                                                                  [address], [place] [address]                                                         Telephone: [phone]. Fax:[phone]                                                                             PROGRESS NOTE    Reason for follow up: New HFrEF, mobile echodensity  Update: For MARYAM / LHC today       Review of symptoms:   Cardiac:  No chest pain. No dyspnea. No palpitations.  Respiratory: no cough. No dyspnea  Gastrointestinal: No diarrhea. No abdominal pain. No bleeding.   Neuro: No focal neuro complaints.    Vitals:  T(C): 36.5 (08-23-24 @ 08:24), Max: 36.8 (08-22-24 @ 17:32)  HR: 94 (08-23-24 @ 09:15) (71 - 98)  BP: 92/53 (08-23-24 @ 09:15) (92/53 - 138/70)  RR: 21 (08-23-24 @ 09:15) (16 - 21)  SpO2: 94% (08-23-24 @ 09:15) (94% - 100%)  Wt(kg): --  I&O's Summary    22 Aug 2024 07:01  -  23 Aug 2024 07:00  --------------------------------------------------------  IN: 780 mL / OUT: 0 mL / NET: 780 mL      Weight (kg): 53.1 (08-20 @ 11:26)    PHYSICAL EXAM:  Appearance: Comfortable. No acute distress  HEENT:  Atraumatic. Normocephalic.  Normal oral mucosa  Neurologic: A & O x 3, no gross focal deficits.  Cardiovascular: RRR S1 S2, No murmur, no rubs/gallops. No JVD  Respiratory: Lungs clear to auscultation, unlabored   Gastrointestinal:  Soft, Non-tender, + BS  Lower Extremities: 2+ Peripheral Pulses, No clubbing, cyanosis, or edema  Psychiatry: Patient is calm. No agitation.   Skin: warm and dry.    CURRENT CARDIAC MEDICATIONS:  carvedilol 3.125 milliGRAM(s) Oral every 12 hours  losartan 12.5 milliGRAM(s) Oral daily  spironolactone 12.5 milliGRAM(s) Oral daily      CURRENT OTHER MEDICATIONS:  albuterol    90 MICROgram(s) HFA Inhaler 2 Puff(s) Inhalation every 6 hours PRN for shortness of breath and/or wheezing  acetaminophen     Tablet .. 650 milliGRAM(s) Oral every 6 hours PRN Temp greater or equal to 38C (100.4F), Mild Pain (1 - 3)  melatonin 3 milliGRAM(s) Oral at bedtime PRN Insomnia  morphine  - Injectable 2 milliGRAM(s) IV Push every 6 hours PRN Severe Pain (7 - 10)  ondansetron Injectable 4 milliGRAM(s) IV Push every 8 hours PRN Nausea and/or Vomiting  oxycodone    5 mG/acetaminophen 325 mG 1 Tablet(s) Oral every 6 hours PRN Moderate Pain (4 - 6)  aluminum hydroxide/magnesium hydroxide/simethicone Suspension 30 milliLiter(s) Oral every 4 hours PRN Dyspepsia  aspirin  chewable 81 milliGRAM(s) Oral once, Stop order after: 1 Doses      LABS:	 	                            10.9   8.53  )-----------( 668      ( 23 Aug 2024 05:47 )             34.6     08-23    137  |  100  |  11.5  ----------------------------<  103<H>  5.1   |  28.0  |  0.64    Ca    8.1<L>      23 Aug 2024 05:47  Phos  3.2     08-22  Mg     1.7     08-22    TPro  5.3<L>  /  Alb  2.3<L>  /  TBili  <0.2<L>  /  DBili  x   /  AST  13  /  ALT  7   /  AlkPhos  78  08-23    PT/INR/PTT ( 05 Aug 2024 17:10 )                       :                       :      11.5         :       28.2                  .        .                   .              .           .       1.04        .                                       Lipid Profile: Date: 08-20 @ 15:03  Total cholesterol 143; Direct LDL: --; HDL: 49; Triglycerides:113    HgA1c:   TSH: Thyroid Stimulating Hormone, Serum: 1.35 uIU/mL      TELEMETRY: SR      DIAGNOSTIC TESTING:  [ ] Echocardiogram:     < from: TTE W or WO Ultrasound Enhancing Agent (08.21.24 @ 10:31) >  CONCLUSIONS:     1. Left ventricular cavity is mildly dilated. Left ventricular wall thickness is normal. Left ventricular systolic function is severely decreased with an ejection fraction of 32 % by Babcock's method of disks with an ejection fraction visually estimated at 25 to 30 %. Global left ventricular hypokinesis.   2. Normal right ventricular cavity size, with normal wall thickness, and normal right ventricular systolic function.    3. There is mild calcification of the mitral valve annulus. There is mild leaflet calcification. There is moderate mitral regurgitation. There is a linear mobile echodensity attached to the subvalvular apparatus measuring approximately 1.5 cm in length, this could represent a small fragment of calcified partially ruptured chorda tendinae, vs. less likely a small old vegetation. Consider MARYAM if clinically indicated.  4. There is a trace pericardial effusion noted adjacent to the posterolateral left ventricle, a small pericardial effusion noted adjacent to the anterior right ventricle and a small pericardial effusion noted adjacent to the right atrium. No echocardiographic evidence of tamponade.    < end of copied text >

## 2024-08-23 NOTE — PROGRESS NOTE ADULT - ASSESSMENT
69 F PMHx peritoneal carcinoma, current smoker sent to [facility] ED from cardiologist office for new cardiomyopathy with reported EF <25 on TTE, also w/ reported pericardial effusion. S/p Paracentesis of 5L. Found to have linear mobile echodensity approx 1.5cm in length. Plan for LHC/MARYAM Friday.     Plan:  New onset HFrEF w/ Pericardial effusion   -Unclear etiology for new cardiomyopathy  - Chest X-ray showed cardiomegaly and small left sided pleural effusion  - ECG showed ST w/ PVCs similar to previous ECG from 8/5  - TTE with low EF 25-30%, linear mobile echo attached to subvalvular apparatus approx 1.5cm in length, could be partially ruptured chorda tendinae vs less likely a small old vegetation.   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   - Painful abdominal distention on exam  - 8/9 paracentesis via IR w/ omental biopsy showing malignant ascites  - Multiple CT show adnexal masses and peritoneal carcinomatoses  - Pain regimen placed, tylenol PRN pain 1-3, oxycodone/tylenol PRN pain 4-6, morphine 2 mg IV PRN pain 7-10  - IR therapeutic paracentesis done  - FU fluid cultures  - Palliative care following  - GYN onc following-  - Per GYN Onc note- "As for Oncology mgmt, pt is candidate for neoadjuvant chemotherapy (3C of Carbo/Taxol) followed by possible interval debulking surgery  - Planned chemotherapy regimen is not cardiotoxic, should be tolerated despite pt's current condition  - Once Cardiology procedures are completed/found to be reassuring, pt can receive C1 during this admission, perhaps 8/24/24"    Thrombocytosis  -Chronic, likely reactive from malignancy, monitor    Sinus tachycardia w/ PVCs  - ECGs compared, 8/20 is similar to ECG on 8/5  - Monitor on Tele  - Maintain K >4, Mg >2, Phos >3  - D/w Cardiology- NPO for LHC/MARYAM    LE swelling  - Could be due HFrEF  - Duplex BLE    Active smoker  -Nicotine patch    DVT: pneumatic compression    Dispo: Medically active. Pending GYN ONC eval for inpatient chemo.    69 F PMHx peritoneal carcinoma, current smoker sent to [facility] ED from cardiologist office for new cardiomyopathy with reported EF <25 on TTE, also w/ reported pericardial effusion. S/p Paracentesis of 5L. Found to have linear mobile echodensity approx 1.5cm in length. Plan for LHC/MARYAM Friday.     Plan:  New onset HFrEF w/ Pericardial effusion   -Unclear etiology for new cardiomyopathy  - Chest X-ray showed cardiomegaly and small left sided pleural effusion  - ECG showed ST w/ PVCs similar to previous ECG from 8/5  - TTE with low EF 25-30%, linear mobile echo attached to subvalvular apparatus approx 1.5cm in length, could be partially ruptured chorda tendinae vs less likely a small old vegetation.   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   - Painful abdominal distention on exam  - 8/9 paracentesis via IR w/ omental biopsy showing malignant ascites  - Multiple CT show adnexal masses and peritoneal carcinomatoses  - Pain regimen placed, tylenol PRN pain 1-3, oxycodone/tylenol PRN pain 4-6, morphine 2 mg IV PRN pain 7-10  - IR therapeutic paracentesis done  - FU fluid cultures  - Palliative care following  - GYN onc following-  - Per GYN Onc note- "As for Oncology mgmt, pt is candidate for neoadjuvant chemotherapy (3C of Carbo/Taxol) followed by possible interval debulking surgery  - Planned chemotherapy regimen is not cardiotoxic, should be tolerated despite pt's current condition  - Once Cardiology procedures are completed/found to be reassuring, pt can receive C1 during this admission, perhaps 8/24/24"    Thrombocytosis  -Chronic, likely reactive from malignancy, monitor    Sinus tachycardia w/ PVCs  - ECGs compared, 8/20 is similar to ECG on 8/5  - Monitor on Tele  - Maintain K >4, Mg >2, Phos >3  - D/w Cardiology- NPO for LHC/MARYAM    LE swelling  - Could be due HFrEF  - Duplex BLE    Active smoker  -Nicotine patch    DVT: pneumatic compression    Dispo: Medically active. Pending GYN ONC eval for inpatient chemo.

## 2024-08-23 NOTE — DISCHARGE NOTE PROVIDER - CARE PROVIDERS DIRECT ADDRESSES
[email or web address]

## 2024-08-23 NOTE — DISCHARGE NOTE PROVIDER - NSDCFUSCHEDAPPT_GEN_ALL_CORE_FT
[facility]  CARDIOLOGY [address]  Scheduled Appointment: 08/26/2024    Yemi Holloway  [facility]  INTMED [address]  Scheduled Appointment: 09/09/2024    [facility]  [place] [facility]  Scheduled Appointment: 09/10/2024    [facility]  [place] [facility]  Scheduled Appointment: 10/01/2024    [facility]  [place] [facility]  Scheduled Appointment: 10/22/2024     Yemi Holloway  [facility]  INTMED [address]  Scheduled Appointment: 09/09/2024    [facility]  [place] [facility]  Scheduled Appointment: 09/10/2024    [facility]  [place] [facility]  Scheduled Appointment: 10/01/2024    [facility]  [place] [facility]  Scheduled Appointment: 10/22/2024

## 2024-08-23 NOTE — DISCHARGE NOTE PROVIDER - PROVIDER TOKENS
PROVIDER:[TOKEN:[[id number]],FOLLOWUP:[2 weeks]] PROVIDER:[TOKEN:[[id number]],FOLLOWUP:[2 weeks]],PROVIDER:[TOKEN:[[id number]],FOLLOWUP:[1 week]]

## 2024-08-23 NOTE — PROGRESS NOTE ADULT - TIME BILLING
Time spent reviewing the chart documentation, reviewing labs and imaging studies, evaluating the patient, discussing the plan of care with the consultants & medical team, and documenting.

## 2024-08-23 NOTE — DISCHARGE NOTE PROVIDER - DETAILS OF MALNUTRITION DIAGNOSIS/DIAGNOSES
This patient has been assessed with a concern for Malnutrition and was treated during this hospitalization for the following Nutrition diagnosis/diagnoses:     -  08/22/2024: Moderate protein-calorie malnutrition

## 2024-08-24 LAB
ALBUMIN SERPL ELPH-MCNC: 2.1 G/DL — LOW (ref 3.3–5.2)
ALP SERPL-CCNC: 75 U/L — SIGNIFICANT CHANGE UP (ref 40–120)
ALT FLD-CCNC: 8 U/L — SIGNIFICANT CHANGE UP
ANION GAP SERPL CALC-SCNC: 8 MMOL/L — SIGNIFICANT CHANGE UP (ref 5–17)
AST SERPL-CCNC: 13 U/L — SIGNIFICANT CHANGE UP
BILIRUB SERPL-MCNC: <0.2 MG/DL — LOW (ref 0.4–2)
BUN SERPL-MCNC: 11 MG/DL — SIGNIFICANT CHANGE UP (ref 8–20)
CALCIUM SERPL-MCNC: 7.9 MG/DL — LOW (ref 8.4–10.5)
CHLORIDE SERPL-SCNC: 99 MMOL/L — SIGNIFICANT CHANGE UP (ref 96–108)
CO2 SERPL-SCNC: 26 MMOL/L — SIGNIFICANT CHANGE UP (ref 22–29)
CREAT SERPL-MCNC: 0.49 MG/DL — LOW (ref 0.5–1.3)
EGFR: 101 ML/MIN/1.73M2 — SIGNIFICANT CHANGE UP
GLUCOSE SERPL-MCNC: 99 MG/DL — SIGNIFICANT CHANGE UP (ref 70–99)
HCT VFR BLD CALC: 34.2 % — LOW (ref 34.5–45)
HGB BLD-MCNC: 11 G/DL — LOW (ref 11.5–15.5)
MAGNESIUM SERPL-MCNC: 1.7 MG/DL — SIGNIFICANT CHANGE UP (ref 1.6–2.6)
MCHC RBC-ENTMCNC: 29.5 PG — SIGNIFICANT CHANGE UP (ref 27–34)
MCHC RBC-ENTMCNC: 32.2 GM/DL — SIGNIFICANT CHANGE UP (ref 32–36)
MCV RBC AUTO: 91.7 FL — SIGNIFICANT CHANGE UP (ref 80–100)
PHOSPHATE SERPL-MCNC: 3 MG/DL — SIGNIFICANT CHANGE UP (ref 2.4–4.7)
PLATELET # BLD AUTO: 592 K/UL — HIGH (ref 150–400)
POTASSIUM SERPL-MCNC: 4.9 MMOL/L — SIGNIFICANT CHANGE UP (ref 3.5–5.3)
POTASSIUM SERPL-SCNC: 4.9 MMOL/L — SIGNIFICANT CHANGE UP (ref 3.5–5.3)
PROT SERPL-MCNC: 5.1 G/DL — LOW (ref 6.6–8.7)
RBC # BLD: 3.73 M/UL — LOW (ref 3.8–5.2)
RBC # FLD: 12.7 % — SIGNIFICANT CHANGE UP (ref 10.3–14.5)
SODIUM SERPL-SCNC: 133 MMOL/L — LOW (ref 135–145)
WBC # BLD: 7.82 K/UL — SIGNIFICANT CHANGE UP (ref 3.8–10.5)
WBC # FLD AUTO: 7.82 K/UL — SIGNIFICANT CHANGE UP (ref 3.8–10.5)

## 2024-08-24 PROCEDURE — 99233 SBSQ HOSP IP/OBS HIGH 50: CPT

## 2024-08-24 RX ADMIN — SPIRONOLACTONE 12.5 MILLIGRAM(S): 25 TABLET, FILM COATED ORAL at 05:37

## 2024-08-24 RX ADMIN — Medication 1 PATCH: at 09:30

## 2024-08-24 RX ADMIN — Medication 1 PATCH: at 11:37

## 2024-08-24 RX ADMIN — Medication 50 MILLIGRAM(S): at 11:35

## 2024-08-24 RX ADMIN — Medication 1 PATCH: at 07:42

## 2024-08-24 RX ADMIN — Medication 110 MILLIGRAM(S): at 11:35

## 2024-08-24 RX ADMIN — CARVEDILOL 3.12 MILLIGRAM(S): 6.25 TABLET ORAL at 16:11

## 2024-08-24 RX ADMIN — FAMOTIDINE 20 MILLIGRAM(S): 10 INJECTION INTRAVENOUS at 11:36

## 2024-08-24 RX ADMIN — CARVEDILOL 3.12 MILLIGRAM(S): 6.25 TABLET ORAL at 05:36

## 2024-08-24 RX ADMIN — PACLITAXEL 270 MILLIGRAM(S): 6 INJECTION, SOLUTION INTRAVENOUS at 12:13

## 2024-08-24 RX ADMIN — Medication 3 MILLIGRAM(S): at 00:01

## 2024-08-24 RX ADMIN — LOSARTAN POTASSIUM 12.5 MILLIGRAM(S): 50 TABLET ORAL at 05:36

## 2024-08-24 RX ADMIN — Medication 1 PATCH: at 20:55

## 2024-08-24 RX ADMIN — Medication 3 MILLIGRAM(S): at 22:56

## 2024-08-24 RX ADMIN — PALONOSETRON HYDROCHLORIDE 0.25 MILLIGRAM(S): 0.25 INJECTION INTRAVENOUS at 11:36

## 2024-08-24 RX ADMIN — CARBOPLATIN 420 MILLIGRAM(S): 10 INJECTION, SOLUTION INTRAVENOUS at 15:37

## 2024-08-24 NOTE — PROGRESS NOTE ADULT - ASSESSMENT
69 F PMHx peritoneal carcinoma, current smoker sent to [facility] ED from cardiologist office for new cardiomyopathy with reported EF <25 on TTE, also w/ reported pericardial effusion. S/p Paracentesis of 5L. Found to have linear mobile echodensity approx 1.5cm in length.       Plan:  New onset HFrEF w/ Pericardial effusion   -Unclear etiology for new cardiomyopathy  - Chest X-ray showed cardiomegaly and small left sided pleural effusion  - ECG showed ST w/ PVCs similar to previous ECG from 8/5  - TTE with low EF 25-30%, linear mobile echo attached to subvalvular apparatus approx 1.5cm in length, could be partially ruptured chorda tendinae vs less likely a small old vegetation.   - L[facility] with NICM  - GMDT:  spironolactone, losartan, coreg. Loop diuretics held in the setting of pericardial effusion  - Status post MARYAM: EF of 25-30% ;  small mobile echodensity attached to the papillary muscle which represents a calcified ruptured chordae. Mild non-mobile focal atheroma which measures up to 2.30 mm in the visualized portions of the descending aorta.; Small pericardial effusion noted adjacent to the right atrium, small pericardial effusion in the transverse sinus adjacent to the BLAKE and small to moderate sized pericardial effusion noted adjacent to the posterolateral left ventricle with no evidence of hemodynamic compromise (or echocardiographic evidence of cardiac tamponade).    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   - 8/9 paracentesis via IR w/ omental biopsy showing malignant ascites  - Multiple CT show adnexal masses and peritoneal carcinomatoses  - Pain regimen placed, tylenol PRN pain 1-3, oxycodone/tylenol PRN pain 4-6, morphine 2 mg IV PRN pain 7-10  - IR therapeutic paracentesis done  -  Para cultures negative thus far   - GYN onc following- To tentatively start [email or web address] this admission     Thrombocytosis  -Chronic, likely reactive from malignancy, monitor    Sinus tachycardia w/ PVCs  - ECGs compared, 8/20 is similar to ECG on 8/5  - Monitor on Tele  - Maintain K >4, Mg >2, Phos >3    Active smoker  -Nicotine patch    DVT: SCDS  Dispo: Medically active. Pending GYN ONC eval for inpatient chemo.    69 F PMHx peritoneal carcinoma, current smoker sent to [facility] ED from cardiologist office for new cardiomyopathy with reported EF <25 on TTE, also w/ reported pericardial effusion. S/p Paracentesis of 5L. Found to have linear mobile echodensity approx 1.5cm in length.       Plan:  New onset HFrEF w/ Pericardial effusion   -Unclear etiology for new cardiomyopathy  - Chest X-ray showed cardiomegaly and small left sided pleural effusion  - ECG showed ST w/ PVCs similar to previous ECG from 8/5  - TTE with low EF 25-30%, linear mobile echo attached to subvalvular apparatus approx 1.5cm in length, could be partially ruptured chorda tendinae vs less likely a small old vegetation.   - L[facility] with NICM  - GMDT:  spironolactone, losartan, coreg. Loop diuretics held in the setting of pericardial effusion  - Status post MARYAM: EF of 25-30% ;  small mobile echodensity attached to the papillary muscle which represents a calcified ruptured chordae. Mild non-mobile focal atheroma which measures up to 2.30 mm in the visualized portions of the descending aorta.; Small pericardial effusion noted adjacent to the right atrium, small pericardial effusion in the transverse sinus adjacent to the BLAKE and small to moderate sized pericardial effusion noted adjacent to the posterolateral left ventricle with no evidence of hemodynamic compromise (or echocardiographic evidence of cardiac tamponade).    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   - 8/9 paracentesis via IR w/ omental biopsy showing malignant ascites  - Multiple CT show adnexal masses and peritoneal carcinomatoses  - Pain regimen placed, tylenol PRN pain 1-3, oxycodone/tylenol PRN pain 4-6, morphine 2 mg IV PRN pain 7-10  - IR therapeutic paracentesis done  -  Para cultures negative thus far   - GYN onc following- Plan to start IP chemo today     Thrombocytosis  -Chronic, likely reactive from malignancy, monitor    Sinus tachycardia w/ PVCs  - ECGs compared, 8/20 is similar to ECG on 8/5  - Monitor on Tele  - Maintain K >4, Mg >2, Phos >3    Active smoker  -Nicotine patch    DVT: SCDS  Dispo: Medically active

## 2024-08-24 NOTE — PROGRESS NOTE ADULT - ASSESSMENT
A/P: 69 F PMHx peritoneal carcinomatosis, current smoker sent to [facility] ED from cardiologist office for pericardial effusion and EF <25 on TTE. Known to gyn onc service as relatively new patient due to recent finding of peritoneal carcinomatosis.     #New onset HFrEF w/ Pericardial effusion   - Reduced EF on outpatient and inpatient TTE to 25%  - MARYAM & LHC (8/23): NICM, plan for continued medical management  - C/w spironolactone, losartan, coreg as per cardiology  - Loop diuretics held due to pericardial effusion   - Cardiology following     - Care per cardiology and primary team    #Peritoneal carcinomatosis with bilateral adnexal masses  -Patient presented to [facility] ED with distention/pain, was found to have carcinomatosis and adnexal masses, she followed up with Dr Martinez outpatient on 7/31 for presumed advanced GYN related cancer  -IR biopsy of carcinomatosis done outpatient 8/9 revealing "positive for metastatic carcinoma, most consistent with Mullerian GYN primary"  -Original plan was Carbo AUC 5 / Taxol 175mg/m2 +/- Bevacizumab 15mg/kg D1 q21d x 3C followed by repeat imaging +/- interval debulk with HIPEC followed by at least 2-3C adjuvant treatment.  - Patient is candidate for inpatient chemotherapy  as planned chemotherapy regimen is not cardiotoxic. Chem to start today (8/24) followed by 6 cycles q21 days.    Plan to obtain consent to start chemo today. D/w Dr. Martinez

## 2024-08-24 NOTE — CHART NOTE - NSCHARTNOTEFT_GEN_A_CORE
Pt is s/p MARYAM and LHC 8/23/2024       T(F): 98.1 (08-24-24 @ 05:21)  HR: 87 (08-24-24 @ 05:21)  BP: 129/68 (08-24-24 @ 05:21)  BP(mean): 74 (08-24-24 @ 00:00)  ABP: --  RR: 18 (08-24-24 @ 05:21)  SpO2: 96% (08-24-24 @ 05:21)  CVP(mm Hg): --  CVP(cm H2O): --  PHYSICAL EXAM:   Neurological: AAOx3, CNII-XII intact,  strength 5/5 b/l  ENT: mucus membrane moist  Cardiovascular: RRR  Respiratory: CTA  Gastrointestinal: soft, NT, ND, BS+  Extremities: warm, no dependent edema  Vascular: no cyanosis/erythema  Skin: no rashes  MSK: no joint swelling.   LABS:    08-24    133<L>  |  99  |  11.0  ----------------------------<  99  4.9   |  26.0  |  0.49<L>    Ca    7.9<L>      24 Aug 2024 05:16  Phos  3.0     08-24  Mg     1.7     08-24    TPro  5.1<L>  /  Alb  2.1<L>  /  TBili  <0.2<L>  /  DBili  x   /  AST  13  /  ALT  8   /  AlkPhos  75  08-24  LIVER FUNCTIONS - ( 24 Aug 2024 05:16 )  Alb: 2.1 g/dL / Pro: 5.1 g/dL / ALK PHOS: 75 U/L / ALT: 8 U/L / AST: 13 U/L / GGT: x                               11.0   7.82  )-----------( 592      ( 24 Aug 2024 05:16 )             34.2     Urinalysis Basic - ( 24 Aug 2024 05:16 )    Color: x / Appearance: x / SG: x / pH: x  Gluc: 99 mg/dL / Ketone: x  / Bili: x / Urobili: x   Blood: x / Protein: x / Nitrite: x   Leuk Esterase: x / RBC: x / WBC x   Sq Epi: x / Non Sq Epi: x / Bacteria: x    CAPILLARY BLOOD GLUCOSE          A/P:    NEURO:  CV:  PULM:   GI/FEN:  :  ENDO: ISS  ID:  PPX: SCDs   LINES: PIVs,   WOUNDS/DRAINS:           above event discussed with Attg Pt is s/p MARYAM and LHC 8/23/2024 RRA site soft palpable, small amount of ecchymosis, no hematoma, neurovascular intact   Plan initiate IP chemo, clinical cards to follow up on Monday, call prn   labs reviewed stable     T(F): 98.1 (08-24-24 @ 05:21)  HR: 87 (08-24-24 @ 05:21)  BP: 129/68 (08-24-24 @ 05:21)  BP(mean): 74 (08-24-24 @ 00:00)  RR: 18 (08-24-24 @ 05:21)  SpO2: 96% (08-24-24 @ 05:21)    PHYSICAL EXAM:   Neurological: AAOx3, CNII-XII intact,  strength 5/5 b/l  ENT: mucus membrane moist  Cardiovascular: RRR  Respiratory: CTA  Gastrointestinal: soft, NT, ND, distended tympanic   Extremities: warm, no dependent edema  Vascular: no cyanosis/erythema  Skin: no rashes  MSK: no joint swelling.   LABS:    08-24    133<L>  |  99  |  11.0  ----------------------------<  99  4.9   |  26.0  |  0.49<L>    Ca    7.9<L>      24 Aug 2024 05:16  Phos  3.0     08-24  Mg     1.7     08-24    TPro  5.1<L>  /  Alb  2.1<L>  /  TBili  <0.2<L>  /  DBili  x   /  AST  13  /  ALT  8   /  AlkPhos  75  08-24  LIVER FUNCTIONS - ( 24 Aug 2024 05:16 )  Alb: 2.1 g/dL / Pro: 5.1 g/dL / ALK PHOS: 75 U/L / ALT: 8 U/L / AST: 13 U/L / GGT: x                               11.0   7.82  )-----------( 592      ( 24 Aug 2024 05:16 )             34.2     Urinalysis Basic - ( 24 Aug 2024 05:16 )    Color: x / Appearance: x / SG: x / pH: x  Gluc: 99 mg/dL / Ketone: x  / Bili: x / Urobili: x   Blood: x / Protein: x / Nitrite: x   Leuk Esterase: x / RBC: x / WBC x   Sq Epi: x / Non Sq Epi: x / Bacteria: x

## 2024-08-24 NOTE — PROGRESS NOTE ADULT - SUBJECTIVE AND OBJECTIVE BOX
CC: Follow up     INTERVAL HPI/OVERNIGHT EVENTS: Patient seen and examined, anxious about starting chemotherapy today. Denies chest pain sob or palpitations.       Vital Signs Last 24 Hrs  T(C): 36.8 (24 Aug 2024 12:52), Max: 36.9 (23 Aug 2024 20:00)  T(F): 98.2 (24 Aug 2024 12:52), Max: 98.4 (23 Aug 2024 20:00)  HR: 72 (24 Aug 2024 12:52) (72 - 96)  BP: 124/84 (24 Aug 2024 12:52) (94/50 - 129/68)  BP(mean): 74 (24 Aug 2024 00:00) (66 - 74)  RR: 18 (24 Aug 2024 12:52) (18 - 18)  SpO2: 98% (24 Aug 2024 12:52) (94% - 98%)    Parameters below as of 24 Aug 2024 12:52  Patient On (Oxygen Delivery Method): room air        PHYSICAL EXAM:    GENERAL: NAD, AOX3  HEAD:  Atraumatic, Normocephalic  EYES: conjunctiva and sclera clear  ENMT: Moist mucous membranes  CHEST/LUNG: Clear to auscultation bilaterally; No rales, rhonchi, wheezing, or rubs  HEART: Regular rate and rhythm; No murmurs, rubs, or gallops  ABDOMEN: Soft, Nontender,++distended; Bowel sounds present  EXTREMITIES:  2+ Peripheral Pulses, No clubbing, cyanosis, or edema        MEDICATIONS  (STANDING):  CARBOplatin IVPB (eMAR) 420 milliGRAM(s) IV Intermittent once  carvedilol 3.125 milliGRAM(s) Oral every 12 hours  losartan 12.5 milliGRAM(s) Oral daily  nicotine -  14 mG/24Hr(s) Patch 1 Patch Transdermal daily  spironolactone 12.5 milliGRAM(s) Oral daily    MEDICATIONS  (PRN):  acetaminophen     Tablet .. 650 milliGRAM(s) Oral every 6 hours PRN Temp greater or equal to 38C (100.4F), Mild Pain (1 - 3)  albuterol    90 MICROgram(s) HFA Inhaler 2 Puff(s) Inhalation every 6 hours PRN for shortness of breath and/or wheezing  aluminum hydroxide/magnesium hydroxide/simethicone Suspension 30 milliLiter(s) Oral every 4 hours PRN Dyspepsia  melatonin 3 milliGRAM(s) Oral at bedtime PRN Insomnia  morphine  - Injectable 2 milliGRAM(s) IV Push every 6 hours PRN Severe Pain (7 - 10)  ondansetron Injectable 4 milliGRAM(s) IV Push every 8 hours PRN Nausea and/or Vomiting  oxycodone    5 mG/acetaminophen 325 mG 1 Tablet(s) Oral every 6 hours PRN Moderate Pain (4 - 6)      Allergies    No Known Allergies    Intolerances          LABS:                          11.0   7.82  )-----------( 592      ( 24 Aug 2024 05:16 )             34.2     08-24    133<L>  |  99  |  11.0  ----------------------------<  99  4.9   |  26.0  |  0.49<L>    Ca    7.9<L>      24 Aug 2024 05:16  Phos  3.0     08-24  Mg     1.7     08-24    TPro  5.1<L>  /  Alb  2.1<L>  /  TBili  <0.2<L>  /  DBili  x   /  AST  13  /  ALT  8   /  AlkPhos  75  08-24      Urinalysis Basic - ( 24 Aug 2024 05:16 )    Color: x / Appearance: x / SG: x / pH: x  Gluc: 99 mg/dL / Ketone: x  / Bili: x / Urobili: x   Blood: x / Protein: x / Nitrite: x   Leuk Esterase: x / RBC: x / WBC x   Sq Epi: x / Non Sq Epi: x / Bacteria: x        RADIOLOGY & ADDITIONAL TESTS:

## 2024-08-24 NOTE — PROGRESS NOTE ADULT - SUBJECTIVE AND OBJECTIVE BOX
69 F PMHx peritoneal carcinomatosis, current smoker sent to [facility] ED from cardiologist office for pericardial effusion and EF <25 on TTE. Known to gyn onc service as relatively new patient due to recent finding of peritoneal carcinomatosis.     SUBJECTIVE:  No acute events or concerns overnight.  Patient denies CP, SOB, dizziness, n/v, diarrhea.  Tolerating PO, ambulating, spontaneously voiding.     OBJECTIVE:  Vital Signs Last 24 Hrs  T(C): 36.7 (24 Aug 2024 05:21), Max: 36.9 (23 Aug 2024 20:00)  T(F): 98.1 (24 Aug 2024 05:21), Max: 98.4 (23 Aug 2024 20:00)  HR: 87 (24 Aug 2024 05:21) (76 - 96)  BP: 129/68 (24 Aug 2024 05:21) (94/50 - 129/68)  BP(mean): 74 (24 Aug 2024 00:00) (66 - 74)  RR: 18 (24 Aug 2024 05:21) (16 - 22)  SpO2: 96% (24 Aug 2024 05:21) (92% - 96%)    Parameters below as of 24 Aug 2024 05:21  Patient On (Oxygen Delivery Method): room air    PHYSICAL EXAM:  GENERAL: NAD, well-developed, thin  HEAD:  Atraumatic, Normocephalic  EYES: EOMI, PERRLA, conjunctiva and sclera clear  NECK: Supple  NERVOUS SYSTEM:  Alert & Oriented X3, Good concentration  CHEST/LUNG: Clear to auscultation bilaterally; No rales, rhonchi, wheezing, or rubs  HEART: Regular rate and rhythm; No murmurs, rubs, or gallops  ABDOMEN: Soft, Nondistended. Nontender. Bowel sounds present, No rebound, No guarding.   EXTREMITIES: No calf tenderness bilaterally    LABS:                        11.0   7.82  )-----------( 592      ( 24 Aug 2024 05:16 )             34.2   08-24    133<L>  |  99  |  11.0  ----------------------------<  99  4.9   |  26.0  |  0.49<L>    Ca    7.9<L>      24 Aug 2024 05:16  Phos  3.0     08-24  Mg     1.7     08-24    TPro  5.1<L>  /  Alb  2.1<L>  /  TBili  <0.2<L>  /  DBili  x   /  AST  13  /  ALT  8   /  AlkPhos  75  08-24    < from: MARYAM W or WO Ultrasound Enhancing Agent (08.23.24 @ 08:47) >  CONCLUSIONS:      1. Left ventricular systolic function is severely decreased with an ejection fraction visually estimated at 25 to 30 %.   2. Normal right ventricular cavity size and normal right ventricular systolic function.   3. The mitral valve appears thickened, with moderate mitral regurgitaiton. There is a small mobile echodensity attached to the papillary muscle which represents a calcified ruptured chordae.   4. Trileaflet aortic valve with normal systolic excursion, with trace aortic regurgitation.   5. No evidence of a patent foramen ovale by color flow Doppler.   6. No evidence of left atrial or left atrial appendage thrombus. The left atrial appendage emptying velocity is normal at 60 cm/s.   7. Mild aortic calcification seen in the aortic root.   8. Mild non-mobile focal atheroma which measures up to 2.30 mm in the visualized portions of the descending aorta.   9. Small pericardial effusion noted adjacent to the right atrium, small pericardial effusion in the transverse sinus adjacent to the BLAKE and small to moderate sized pericardial effusion noted adjacent to the posterolateral left ventricle with no evidence of hemodynamic compromise (or echocardiographic evidence of cardiac tamponade).    < end of copied text >    < from: Cardiac Catheterization (08.23.24 @ 09:48) >  Indications:               Congestive heart failure, acute systolic   Lab Visit Indication:      cardiomyopathy     Diagnostic Conclusions:     LM: No diseae, short LM   LAD: Luminal irregularities    LCx: Luminal irregularities    RCA: Mild diffuse disease   LVEDP 10   Recommendations:     Medical management for Non-ischemic Cardiomyopathy      Acute complication:    No complications     < end of copied text >

## 2024-08-25 LAB
ALBUMIN SERPL ELPH-MCNC: 2.5 G/DL — LOW (ref 3.3–5.2)
ALP SERPL-CCNC: 101 U/L — SIGNIFICANT CHANGE UP (ref 40–120)
ALT FLD-CCNC: 11 U/L — SIGNIFICANT CHANGE UP
ANION GAP SERPL CALC-SCNC: 11 MMOL/L — SIGNIFICANT CHANGE UP (ref 5–17)
AST SERPL-CCNC: 16 U/L — SIGNIFICANT CHANGE UP
BASOPHILS # BLD AUTO: 0.02 K/UL — SIGNIFICANT CHANGE UP (ref 0–0.2)
BASOPHILS NFR BLD AUTO: 0.2 % — SIGNIFICANT CHANGE UP (ref 0–2)
BILIRUB SERPL-MCNC: <0.2 MG/DL — LOW (ref 0.4–2)
BUN SERPL-MCNC: 15.6 MG/DL — SIGNIFICANT CHANGE UP (ref 8–20)
CALCIUM SERPL-MCNC: 8.7 MG/DL — SIGNIFICANT CHANGE UP (ref 8.4–10.5)
CHLORIDE SERPL-SCNC: 99 MMOL/L — SIGNIFICANT CHANGE UP (ref 96–108)
CO2 SERPL-SCNC: 24 MMOL/L — SIGNIFICANT CHANGE UP (ref 22–29)
CREAT SERPL-MCNC: 0.6 MG/DL — SIGNIFICANT CHANGE UP (ref 0.5–1.3)
EGFR: 96 ML/MIN/1.73M2 — SIGNIFICANT CHANGE UP
EOSINOPHIL # BLD AUTO: 0.01 K/UL — SIGNIFICANT CHANGE UP (ref 0–0.5)
EOSINOPHIL NFR BLD AUTO: 0.1 % — SIGNIFICANT CHANGE UP (ref 0–6)
GLUCOSE SERPL-MCNC: 161 MG/DL — HIGH (ref 70–99)
HCT VFR BLD CALC: 33.6 % — LOW (ref 34.5–45)
HGB BLD-MCNC: 10.8 G/DL — LOW (ref 11.5–15.5)
IMM GRANULOCYTES NFR BLD AUTO: 0.5 % — SIGNIFICANT CHANGE UP (ref 0–0.9)
LYMPHOCYTES # BLD AUTO: 0.62 K/UL — LOW (ref 1–3.3)
LYMPHOCYTES # BLD AUTO: 6.7 % — LOW (ref 13–44)
MCHC RBC-ENTMCNC: 29.4 PG — SIGNIFICANT CHANGE UP (ref 27–34)
MCHC RBC-ENTMCNC: 32.1 GM/DL — SIGNIFICANT CHANGE UP (ref 32–36)
MCV RBC AUTO: 91.6 FL — SIGNIFICANT CHANGE UP (ref 80–100)
MONOCYTES # BLD AUTO: 0.25 K/UL — SIGNIFICANT CHANGE UP (ref 0–0.9)
MONOCYTES NFR BLD AUTO: 2.7 % — SIGNIFICANT CHANGE UP (ref 2–14)
NEUTROPHILS # BLD AUTO: 8.31 K/UL — HIGH (ref 1.8–7.4)
NEUTROPHILS NFR BLD AUTO: 89.8 % — HIGH (ref 43–77)
PLATELET # BLD AUTO: 619 K/UL — HIGH (ref 150–400)
POTASSIUM SERPL-MCNC: 5.3 MMOL/L — SIGNIFICANT CHANGE UP (ref 3.5–5.3)
POTASSIUM SERPL-SCNC: 5.3 MMOL/L — SIGNIFICANT CHANGE UP (ref 3.5–5.3)
PROT SERPL-MCNC: 5.7 G/DL — LOW (ref 6.6–8.7)
RBC # BLD: 3.67 M/UL — LOW (ref 3.8–5.2)
RBC # FLD: 12.3 % — SIGNIFICANT CHANGE UP (ref 10.3–14.5)
SODIUM SERPL-SCNC: 134 MMOL/L — LOW (ref 135–145)
WBC # BLD: 9.26 K/UL — SIGNIFICANT CHANGE UP (ref 3.8–10.5)
WBC # FLD AUTO: 9.26 K/UL — SIGNIFICANT CHANGE UP (ref 3.8–10.5)

## 2024-08-25 PROCEDURE — 99232 SBSQ HOSP IP/OBS MODERATE 35: CPT

## 2024-08-25 PROCEDURE — 99232 SBSQ HOSP IP/OBS MODERATE 35: CPT | Mod: GC

## 2024-08-25 RX ORDER — CARVEDILOL 6.25 MG/1
6.25 TABLET ORAL EVERY 12 HOURS
Refills: 0 | Status: DISCONTINUED | OUTPATIENT
Start: 2024-08-25 | End: 2024-08-26

## 2024-08-25 RX ORDER — CARVEDILOL 6.25 MG/1
3.12 TABLET ORAL ONCE
Refills: 0 | Status: COMPLETED | OUTPATIENT
Start: 2024-08-25 | End: 2024-08-25

## 2024-08-25 RX ADMIN — Medication 1 PATCH: at 07:51

## 2024-08-25 RX ADMIN — CARVEDILOL 3.12 MILLIGRAM(S): 6.25 TABLET ORAL at 05:10

## 2024-08-25 RX ADMIN — CARVEDILOL 6.25 MILLIGRAM(S): 6.25 TABLET ORAL at 17:41

## 2024-08-25 RX ADMIN — LOSARTAN POTASSIUM 12.5 MILLIGRAM(S): 50 TABLET ORAL at 05:10

## 2024-08-25 RX ADMIN — CARVEDILOL 3.12 MILLIGRAM(S): 6.25 TABLET ORAL at 12:54

## 2024-08-25 RX ADMIN — Medication 1 PATCH: at 19:49

## 2024-08-25 RX ADMIN — SPIRONOLACTONE 12.5 MILLIGRAM(S): 25 TABLET, FILM COATED ORAL at 05:10

## 2024-08-25 RX ADMIN — Medication 3 MILLIGRAM(S): at 21:35

## 2024-08-25 RX ADMIN — Medication 1 PATCH: at 12:55

## 2024-08-25 NOTE — PROGRESS NOTE ADULT - PROBLEM SELECTOR PLAN 1
s/p chemo  Doing well
-New onset  -Euvolemic  -C/w bb, ARB, MRA  -For LHC today
- s/p paracentesis   - heme-onc following, appreciate recs
Ascites  consider scan and paracentesis  oncology consult

## 2024-08-25 NOTE — PROGRESS NOTE ADULT - PROBLEM SELECTOR PROBLEM 1
Acute HFrEF (heart failure with reduced ejection fraction)
Peritoneal carcinomatosis

## 2024-08-25 NOTE — PROGRESS NOTE ADULT - PROBLEM SELECTOR PROBLEM 2
Congestive heart failure with cardiomyopathy
Echocardiogram abnormal

## 2024-08-25 NOTE — PROGRESS NOTE ADULT - ASSESSMENT
68 y/o female active tobacco user with with new dx of b/l adnexal masses and extensive peritoneal carcinomatosis c/f at least Stage BARBARA ovarian vs. primary peritoneal  who was referred from office with mild sob found to have a new cardiomyopathy and bilateral effusion. Cathed and found to have luminal irregularities and lvedp 10-  MARYAM ? vegetation revealed There is a small mobile echodensity attached to the papillary muscle which represents a calcified ruptured chordae

## 2024-08-25 NOTE — PROGRESS NOTE ADULT - ASSESSMENT
69 F PMHx peritoneal carcinoma, current smoker sent to [facility] ED from cardiologist office for new cardiomyopathy with reported EF <25 on TTE, also w/ reported pericardial effusion. S/p Paracentesis of 5L. Found to have linear mobile echodensity approx 1.5cm in length.       Plan:  New onset HFrEF w/ Pericardial effusion   -Unclear etiology for new cardiomyopathy  - Chest X-ray showed cardiomegaly and small left sided pleural effusion  - ECG showed ST w/ PVCs similar to previous ECG from 8/5  - TTE with low EF 25-30%, linear mobile echo attached to subvalvular apparatus approx 1.5cm in length, could be partially ruptured chorda tendinae vs less likely a small old vegetation.   - L[facility] with NICM  - GMDT:  spironolactone, losartan, coreg. Loop diuretics held in the setting of pericardial effusion  - Status post MARYAM: EF of 25-30% ;  small mobile echodensity attached to the papillary muscle which represents a calcified ruptured chordae. Mild non-mobile focal atheroma which measures up to 2.30 mm in the visualized portions of the descending aorta.; Small pericardial effusion noted adjacent to the right atrium, small pericardial effusion in the transverse sinus adjacent to the BLAKE and small to moderate sized pericardial effusion noted adjacent to the posterolateral left ventricle with no evidence of hemodynamic compromise (or echocardiographic evidence of cardiac tamponade).  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 to follow up with gyn/onc as outpatient     Thrombocytosis  -Chronic, likely reactive from malignancy, monitor    Active smoker  -Nicotine patch    DVT: SCDS  Dicharge disposition; Anticipate discharge home in AM

## 2024-08-25 NOTE — PROGRESS NOTE ADULT - PROBLEM SELECTOR PLAN 2
Educated re low na diet  LVEDP 10    Keep k >4 and mg >2  GDMT  Increase coreg to 6.25 bid due to ectopy  c/w losartan 12.5mg qd and spirolactone  check K in the am  mildly elevated today    No further Cardio recommendations  Will sign off    Cardiace meds  carvedilol 3.125 milliGRAM(s) Oral every 12 hours  losartan 12.5 milliGRAM(s) Oral daily  nicotine -  14 mG/24Hr(s) Patch 1 Patch Transdermal daily  spironolactone 12.5 milliGRAM(s) Oral

## 2024-08-25 NOTE — PROGRESS NOTE ADULT - SUBJECTIVE AND OBJECTIVE BOX
[facility]                                                         CARDIOLOGY AT [facility]                                                                  [address], [place] [address]                                                         Telephone: [phone]. Fax:[phone]                                                                             PROGRESS NOTE    Reason for follow up: Non ischemic cardiomyopathy  Update: Tolerated chemo yesterday  Feeling ok      Review of symptoms:   Cardiac:  No chest pain. No dyspnea. No palpitations.  Respiratory: no cough. No dyspnea  Gastrointestinal: No diarrhea. No abdominal pain. No bleeding.   Neuro: No focal neuro complaints.      Vitals:  T(C): 36.6 (08-25-24 @ 05:00), Max: 36.9 (08-24-24 @ 17:14)  HR: 75 (08-25-24 @ 05:00) (72 - 92)  BP: 102/58 (08-25-24 @ 05:00) (102/58 - 124/88)  RR: 17 (08-25-24 @ 05:00) (17 - 18)  SpO2: 99% (08-25-24 @ 05:00) (94% - 99%)  Wt(kg): --  I&O's Summary    24 Aug 2024 07:01  -  25 Aug 2024 07:00  --------------------------------------------------------  IN: 900 mL / OUT: 700 mL / NET: 200 mL  Weight (kg): 53.1 (08-20 @ 11:26)      PHYSICAL EXAM:  Appearance: Comfortable. No acute distress  HEENT:  Atraumatic. Normocephalic.  Normal oral mucosa  Neurologic: A & O x 3, no gross focal deficits.  Cardiovascular: RRR S1 S2, No murmur, no rubs/gallops. No JVD  Respiratory: Lungs Decreased lung sounds  Gastrointestinal:  Soft, Non-tender, + BS decreased asites  Lower Extremities: No edema  Psychiatry: Patient is calm. No agitation.   Skin: warm and dry.      CURRENT MEDICATIONS:  MEDICATIONS  (STANDING):  carvedilol 3.125 milliGRAM(s) Oral every 12 hours  losartan 12.5 milliGRAM(s) Oral daily  nicotine -  14 mG/24Hr(s) Patch 1 Patch Transdermal daily  spironolactone 12.5 milliGRAM(s) Oral daily      LABS:	 	                        10.8   9.26  )-----------( 619      ( 25 Aug 2024 05:58 )             33.6     08-25    134<L>  |  99  |  15.6  ----------------------------<  161<H>  5.3   |  24.0  |  0.60    Ca    8.7      25 Aug 2024 05:58  Phos  3.0     08-24  Mg     1.7     08-24    TPro  5.7<L>  /  Alb  2.5<L>  /  TBili  <0.2<L>  /  DBili  x   /  AST  16  /  ALT  11  /  AlkPhos  101  08-25    proBNP:   Lipid Profile: Date: 08-20 @ 15:03  Total cholesterol 143; Direct LDL: --; HDL: 49; Triglycerides:113    HgA1c:   TSH: Thyroid Stimulating Hormone, Serum: 1.35 uIU/mL      TELEMETRY: Nsr with 6 beat wide complex tachycardia  ECG:  	      DIAGNOSTIC TESTING:  [ ] Echocardiogram:  < from: MARYAM W or WO Ultrasound Enhancing Agent (08.23.24 @ 08:47) >      1. Left ventricular systolic function is severely decreased with an ejection fraction visually estimated at 25 to 30 %.   2. Normal right ventricular cavity size and normal right ventricular systolic function.   3. The mitral valve appears thickened, with moderate mitral regurgitaiton. There is a small mobile echodensity attached to the papillary muscle which represents a calcified ruptured chordae.   4. Trileaflet aortic valve with normal systolic excursion, with trace aortic regurgitation.   5. No evidence of a patent foramen ovale by color flow Doppler.   6. No evidence of left atrial or left atrial appendage thrombus. The left atrial appendage emptying velocity is normal at 60 cm/s.   7. Mild aortic calcification seen in the aortic root.   8. Mild non-mobile focal atheroma which measures up to 2.30 mm in the visualized portions of the descending aorta.   9. Small pericardial effusion noted adjacent to the right atrium, small pericardial effusion in the transverse sinus adjacent to the BLAKE and small to moderate sized pericardial effusion noted adjacent to the posterolateral left ventricle with no evidence of hemodynamic compromise (or echocardiographic evidence of cardiac tamponade).    < from: Cardiac Catheterization (08.23.24 @ 09:48) >  Coronary Angiography   The coronary circulation is right dominant.      LM   Left main artery: Angiography shows no disease.      LAD   Left anterior descending artery: Angiography shows minor  irregularities.    CX   Circumflex: Angiography shows minor irregularities.      RCA   Right coronary artery: Angiography shows diffuse mild atherosclerosis.      Left Heart Cath   LHC performed: Aortic valve crossed and left ventricular pressures  were obtained.    LVEDP 10

## 2024-08-25 NOTE — PROGRESS NOTE ADULT - SUBJECTIVE AND OBJECTIVE BOX
CC: Follow up     INTERVAL HPI/OVERNIGHT EVENTS: Patient seen and examined, overall feels well. Tolerated chemotherapy well yesterday. Complaints of constipation       Vital Signs Last 24 Hrs  T(C): 36.7 (25 Aug 2024 11:30), Max: 36.9 (24 Aug 2024 17:14)  T(F): 98 (25 Aug 2024 11:30), Max: 98.4 (24 Aug 2024 17:14)  HR: 74 (25 Aug 2024 11:30) (74 - 92)  BP: 104/53 (25 Aug 2024 11:30) (102/58 - 124/88)  BP(mean): 73 (25 Aug 2024 05:00) (72 - 73)  RR: 18 (25 Aug 2024 11:30) (17 - 18)  SpO2: 93% (25 Aug 2024 11:30) (93% - 99%)    Parameters below as of 25 Aug 2024 05:00  Patient On (Oxygen Delivery Method): room air        PHYSICAL EXAM:    GENERAL: NAD, AOX3  HEAD:  Atraumatic, Normocephalic  EYES:  conjunctiva and sclera clear  ENMT: Moist mucous membranes  CHEST/LUNG: Clear to auscultation bilaterally; No rales, rhonchi, wheezing, or rubs  HEART: Regular rate and rhythm; No murmurs, rubs, or gallops  ABDOMEN: Soft, Nontender, Nondistended; Bowel sounds present  EXTREMITIES:  2+ Peripheral Pulses, No clubbing, cyanosis, or edema        MEDICATIONS  (STANDING):  carvedilol 6.25 milliGRAM(s) Oral every 12 hours  losartan 12.5 milliGRAM(s) Oral daily  nicotine -  14 mG/24Hr(s) Patch 1 Patch Transdermal daily  spironolactone 12.5 milliGRAM(s) Oral daily    MEDICATIONS  (PRN):  acetaminophen     Tablet .. 650 milliGRAM(s) Oral every 6 hours PRN Temp greater or equal to 38C (100.4F), Mild Pain (1 - 3)  albuterol    90 MICROgram(s) HFA Inhaler 2 Puff(s) Inhalation every 6 hours PRN for shortness of breath and/or wheezing  aluminum hydroxide/magnesium hydroxide/simethicone Suspension 30 milliLiter(s) Oral every 4 hours PRN Dyspepsia  melatonin 3 milliGRAM(s) Oral at bedtime PRN Insomnia  morphine  - Injectable 2 milliGRAM(s) IV Push every 6 hours PRN Severe Pain (7 - 10)  ondansetron Injectable 4 milliGRAM(s) IV Push every 8 hours PRN Nausea and/or Vomiting  oxycodone    5 mG/acetaminophen 325 mG 1 Tablet(s) Oral every 6 hours PRN Moderate Pain (4 - 6)      Allergies    No Known Allergies    Intolerances          LABS:                          10.8   9.26  )-----------( 619      ( 25 Aug 2024 05:58 )             33.6     08-25    134<L>  |  99  |  15.6  ----------------------------<  161<H>  5.3   |  24.0  |  0.60    Ca    8.7      25 Aug 2024 05:58  Phos  3.0     08-24  Mg     1.7     08-24    TPro  5.7<L>  /  Alb  2.5<L>  /  TBili  <0.2<L>  /  DBili  x   /  AST  16  /  ALT  11  /  AlkPhos  101  08-25      Urinalysis Basic - ( 25 Aug 2024 05:58 )    Color: x / Appearance: x / SG: x / pH: x  Gluc: 161 mg/dL / Ketone: x  / Bili: x / Urobili: x   Blood: x / Protein: x / Nitrite: x   Leuk Esterase: x / RBC: x / WBC x   Sq Epi: x / Non Sq Epi: x / Bacteria: x        RADIOLOGY & ADDITIONAL TESTS:

## 2024-08-25 NOTE — PROGRESS NOTE ADULT - SUBJECTIVE AND OBJECTIVE BOX
69 F PMHx peritoneal carcinomatosis, current smoker sent to [facility] ED from cardiologist office for pericardial effusion and EF <25 on TTE. Known to gyn onc service as relatively new patient due to recent finding of peritoneal carcinomatosis.     SUBJECTIVE:  Reports tolerated chemo well yesterday.  No acute events or concerns overnight.  Patient denies CP, SOB, dizziness, n/v, diarrhea.  Tolerating PO, ambulating, spontaneously voiding.     OBJECTIVE:  Vital Signs Last 24 Hrs  T(C): 36.6 (25 Aug 2024 05:00), Max: 36.9 (24 Aug 2024 17:14)  T(F): 97.9 (25 Aug 2024 05:00), Max: 98.4 (24 Aug 2024 17:14)  HR: 75 (25 Aug 2024 05:00) (72 - 92)  BP: 102/58 (25 Aug 2024 05:00) (102/58 - 129/68)  BP(mean): 73 (25 Aug 2024 05:00) (72 - 73)  RR: 17 (25 Aug 2024 05:00) (17 - 18)  SpO2: 99% (25 Aug 2024 05:00) (94% - 99%)    Parameters below as of 25 Aug 2024 05:00  Patient On (Oxygen Delivery Method): room air    PHYSICAL EXAM:  GENERAL: NAD, well-developed, thin  HEAD:  Atraumatic, Normocephalic  EYES: EOMI, PERRLA, conjunctiva and sclera clear  NECK: Supple  NERVOUS SYSTEM:  Alert & Oriented X3, Good concentration  CHEST/LUNG: Clear to auscultation bilaterally; No rales, rhonchi, wheezing, or rubs  HEART: Regular rate and rhythm; No murmurs, rubs, or gallops  ABDOMEN: Soft, Nondistended. Nontender. Bowel sounds present, No rebound, No guarding.   EXTREMITIES: No calf tenderness bilaterally    LABS: AM pending       69 F PMHx peritoneal carcinomatosis, current smoker sent to [facility] ED from cardiologist office for pericardial effusion and EF <25 on TTE. Known to gyn onc service as relatively new patient due to recent finding of peritoneal carcinomatosis.     SUBJECTIVE:  Reports tolerated chemo well yesterday. States had burning at IV site but otherwise went "OK"  No acute events or concerns overnight.  Patient denies CP, SOB, dizziness, n/v, diarrhea.  Tolerating PO, ambulating, spontaneously voiding.     OBJECTIVE:  Vital Signs Last 24 Hrs  T(C): 36.6 (25 Aug 2024 05:00), Max: 36.9 (24 Aug 2024 17:14)  T(F): 97.9 (25 Aug 2024 05:00), Max: 98.4 (24 Aug 2024 17:14)  HR: 75 (25 Aug 2024 05:00) (72 - 92)  BP: 102/58 (25 Aug 2024 05:00) (102/58 - 129/68)  BP(mean): 73 (25 Aug 2024 05:00) (72 - 73)  RR: 17 (25 Aug 2024 05:00) (17 - 18)  SpO2: 99% (25 Aug 2024 05:00) (94% - 99%)    Parameters below as of 25 Aug 2024 05:00  Patient On (Oxygen Delivery Method): room air    PHYSICAL EXAM:  GENERAL: NAD, well-developed, thin  HEAD:  Atraumatic, Normocephalic  EYES: EOMI, PERRLA, conjunctiva and sclera clear  NECK: Supple  NERVOUS SYSTEM:  Alert & Oriented X3, Good concentration  CHEST/LUNG: Clear to auscultation bilaterally; No rales, rhonchi, wheezing, or rubs  HEART: Regular rate and rhythm; No murmurs, rubs, or gallops  ABDOMEN: Soft, Nondistended. Nontender. Bowel sounds present, No rebound, No guarding.   EXTREMITIES: No calf tenderness bilaterally    LABS: AM pending

## 2024-08-25 NOTE — PROGRESS NOTE ADULT - ASSESSMENT
WA/P: 69 F PMHx peritoneal carcinomatosis, current smoker sent to [facility] ED from cardiologist office for pericardial effusion and EF <25 on TTE. Known to gyn onc service as relatively new patient due to recent finding of peritoneal carcinomatosis.     #New onset HFrEF w/ Pericardial effusion   - Reduced EF on outpatient and inpatient TTE to 25%  - MARYAM & LHC (8/23): NICM, plan for continued medical management  - C/w spironolactone, losartan, coreg as per cardiology  - Loop diuretics held due to pericardial effusion   - Cardiology following     - Care per cardiology and primary team    #Peritoneal carcinomatosis with bilateral adnexal masses  -Patient presented to [facility] ED with distention/pain, was found to have carcinomatosis and adnexal masses, she followed up with Dr Martinez outpatient on 7/31 for presumed advanced GYN related cancer  -IR biopsy of carcinomatosis done outpatient 8/9 revealing "positive for metastatic carcinoma, most consistent with [facility] primary"  -Original plan was Carbo AUC 5 / Taxol 175mg/m2 +/- Bevacizumab 15mg/kg D1 q21d x 3C followed by repeat imaging +/- interval debulk with HIPEC followed by at least 2-3C adjuvant treatment.  - Patient is candidate for inpatient chemotherapy  as planned chemotherapy regimen is not cardiotoxic. Chemo started yesterday (8/24), will plan for by 6 cycles q21 days. Patient reports tolerated well.     Will discuss with Dr. Martinez   A/P: 69 F PMHx peritoneal carcinomatosis, current smoker sent to [facility] ED from cardiologist office for pericardial effusion and EF <25 on TTE. Known to gyn onc service as relatively new patient due to recent finding of peritoneal carcinomatosis.     #New onset HFrEF w/ Pericardial effusion   - Reduced EF on outpatient and inpatient TTE to 25%  - MARYAM & LHC (8/23): NICM, plan for continued medical management  - C/w spironolactone, losartan, coreg as per cardiology  - Loop diuretics held due to pericardial effusion   - Cardiology following     - Care per cardiology and primary team    #Peritoneal carcinomatosis with bilateral adnexal masses  -Patient presented to [facility] ED with distention/pain, was found to have carcinomatosis and adnexal masses, she followed up with Dr Martinez outpatient on 7/31 for presumed advanced GYN related cancer  -IR biopsy of carcinomatosis done outpatient 8/9 revealing "positive for metastatic carcinoma, most consistent with [facility] primary"  -Original plan was Carbo AUC 5 / Taxol 175mg/m2 +/- Bevacizumab 15mg/kg D1 q21d x 3C followed by repeat imaging +/- interval debulk with HIPEC followed by at least 2-3C adjuvant treatment.  - Patient is candidate for inpatient chemotherapy  as planned chemotherapy regimen is not cardiotoxic. Chemo started yesterday (8/24), will plan for by 6 cycles q21 days. Patient reports tolerated well.     Will discuss with Dr. Martinez

## 2024-08-25 NOTE — PROGRESS NOTE ADULT - NS ATTEND AMEND GEN_ALL_CORE FT
68 y/o female active tobacco user with with new dx of b/l adnexal masses and extensive peritoneal carcinomatosis c/f at least Stage BARBARA ovarian vs. primary peritoneal  who was referred from office with mild sob found to have a new cardiomyopathy and bilateral effusion.    [facility] reported minimal coronary disease      initiate on GDMT, c/w ARB, MRA, BBlocker  SGLT2i on hold given need for gyn/onc procedural planning    Repeat Echo in 1 month for EF improvement assessment, needs 3 month echo assessment after GDMT to evaluate for ICD if prognosis/lifespan prohibitive based on gyn onc/oncology    continue optimization of GDMT    we will follow
declines Cardiac catherization and Transesophageal echocardiogram today  patient discussed with her primary cardiologist dr. johnson whom is aware of the patients decision to not undergo procedures today.    Plan for LHC + MARYAM to evaluate cardiomyopathy and mitral valve findings on TTE    she is warm euvolemic and on room air  initiate on GDMT, c/w ARB, MRA, BBlocker  SGLT2i on hold given need for gyn/onc procedural planning    keep npo at midnight    we will follow.
tolerated initiation of chemotherapy well  on GDMT: increase coreg to 6.25mg BID  c/w losartan 12.5mg qday, MRA  tobacco cessation and abstinence    recheck echo as outpatient in 1 month for evaluation of EF improvement  f/u with Dr. Dinh in 2 weeks post discharge    we will follow peripherally.
Patient seen and examined by me.      Patient alert and awake.  Chest- Bilateral Clear BS  Cardiac- S1 and S2  Abdomen- Soft    Patient seen with Connie Hopkins NP    Assessment/Plan:  1. Acute HFrEF  2. Echodensity on mitral subvalvular apparatus  3. Other problems as noted    I have discussed my recommendation with the PA which are outlined above.  Will follow.
Patient seen and examined by me. Agree with assessment and plan as documented. Newly found low EF and malignancy. Plan for LHC.   LHC done with no coronary obstructions. Plan for medical therapy for HFrEF.

## 2024-08-26 ENCOUNTER — APPOINTMENT (OUTPATIENT)
Dept: CARDIOLOGY | Facility: CLINIC | Age: 69
End: 2024-08-26

## 2024-08-26 ENCOUNTER — TRANSCRIPTION ENCOUNTER (OUTPATIENT)
Age: 69
End: 2024-08-26

## 2024-08-26 VITALS
TEMPERATURE: 98 F | DIASTOLIC BLOOD PRESSURE: 61 MMHG | SYSTOLIC BLOOD PRESSURE: 106 MMHG | RESPIRATION RATE: 18 BRPM | HEART RATE: 74 BPM | OXYGEN SATURATION: 100 %

## 2024-08-26 LAB
ALBUMIN SERPL ELPH-MCNC: 2.4 G/DL — LOW (ref 3.3–5.2)
ALP SERPL-CCNC: 80 U/L — SIGNIFICANT CHANGE UP (ref 40–120)
ALT FLD-CCNC: 13 U/L — SIGNIFICANT CHANGE UP
ANION GAP SERPL CALC-SCNC: 9 MMOL/L — SIGNIFICANT CHANGE UP (ref 5–17)
AST SERPL-CCNC: 28 U/L — SIGNIFICANT CHANGE UP
BASOPHILS # BLD AUTO: 0.02 K/UL — SIGNIFICANT CHANGE UP (ref 0–0.2)
BASOPHILS NFR BLD AUTO: 0.2 % — SIGNIFICANT CHANGE UP (ref 0–2)
BILIRUB SERPL-MCNC: <0.2 MG/DL — LOW (ref 0.4–2)
BUN SERPL-MCNC: 18.1 MG/DL — SIGNIFICANT CHANGE UP (ref 8–20)
CALCIUM SERPL-MCNC: 8.2 MG/DL — LOW (ref 8.4–10.5)
CHLORIDE SERPL-SCNC: 100 MMOL/L — SIGNIFICANT CHANGE UP (ref 96–108)
CO2 SERPL-SCNC: 26 MMOL/L — SIGNIFICANT CHANGE UP (ref 22–29)
CREAT SERPL-MCNC: 0.44 MG/DL — LOW (ref 0.5–1.3)
CULTURE RESULTS: SIGNIFICANT CHANGE UP
EGFR: 103 ML/MIN/1.73M2 — SIGNIFICANT CHANGE UP
EOSINOPHIL # BLD AUTO: 0.23 K/UL — SIGNIFICANT CHANGE UP (ref 0–0.5)
EOSINOPHIL NFR BLD AUTO: 2.6 % — SIGNIFICANT CHANGE UP (ref 0–6)
GLUCOSE SERPL-MCNC: 88 MG/DL — SIGNIFICANT CHANGE UP (ref 70–99)
HCT VFR BLD CALC: 33.7 % — LOW (ref 34.5–45)
HGB BLD-MCNC: 10.7 G/DL — LOW (ref 11.5–15.5)
IMM GRANULOCYTES NFR BLD AUTO: 0.6 % — SIGNIFICANT CHANGE UP (ref 0–0.9)
LYMPHOCYTES # BLD AUTO: 1.09 K/UL — SIGNIFICANT CHANGE UP (ref 1–3.3)
LYMPHOCYTES # BLD AUTO: 12.5 % — LOW (ref 13–44)
MCHC RBC-ENTMCNC: 28.9 PG — SIGNIFICANT CHANGE UP (ref 27–34)
MCHC RBC-ENTMCNC: 31.8 GM/DL — LOW (ref 32–36)
MCV RBC AUTO: 91.1 FL — SIGNIFICANT CHANGE UP (ref 80–100)
MONOCYTES # BLD AUTO: 0.2 K/UL — SIGNIFICANT CHANGE UP (ref 0–0.9)
MONOCYTES NFR BLD AUTO: 2.3 % — SIGNIFICANT CHANGE UP (ref 2–14)
NEUTROPHILS # BLD AUTO: 7.14 K/UL — SIGNIFICANT CHANGE UP (ref 1.8–7.4)
NEUTROPHILS NFR BLD AUTO: 81.8 % — HIGH (ref 43–77)
PLATELET # BLD AUTO: 582 K/UL — HIGH (ref 150–400)
POTASSIUM SERPL-MCNC: 5.3 MMOL/L — SIGNIFICANT CHANGE UP (ref 3.5–5.3)
POTASSIUM SERPL-SCNC: 5.3 MMOL/L — SIGNIFICANT CHANGE UP (ref 3.5–5.3)
PROT SERPL-MCNC: 5.6 G/DL — LOW (ref 6.6–8.7)
RBC # BLD: 3.7 M/UL — LOW (ref 3.8–5.2)
RBC # FLD: 12.7 % — SIGNIFICANT CHANGE UP (ref 10.3–14.5)
SODIUM SERPL-SCNC: 134 MMOL/L — LOW (ref 135–145)
SPECIMEN SOURCE: SIGNIFICANT CHANGE UP
WBC # BLD: 8.73 K/UL — SIGNIFICANT CHANGE UP (ref 3.8–10.5)
WBC # FLD AUTO: 8.73 K/UL — SIGNIFICANT CHANGE UP (ref 3.8–10.5)

## 2024-08-26 PROCEDURE — 87015 SPECIMEN INFECT AGNT CONCNTJ: CPT

## 2024-08-26 PROCEDURE — 93320 DOPPLER ECHO COMPLETE: CPT

## 2024-08-26 PROCEDURE — C1894: CPT

## 2024-08-26 PROCEDURE — 87075 CULTR BACTERIA EXCEPT BLOOD: CPT

## 2024-08-26 PROCEDURE — 80076 HEPATIC FUNCTION PANEL: CPT

## 2024-08-26 PROCEDURE — 82042 OTHER SOURCE ALBUMIN QUAN EA: CPT

## 2024-08-26 PROCEDURE — 87205 SMEAR GRAM STAIN: CPT

## 2024-08-26 PROCEDURE — 99285 EMERGENCY DEPT VISIT HI MDM: CPT

## 2024-08-26 PROCEDURE — C8929: CPT

## 2024-08-26 PROCEDURE — 84157 ASSAY OF PROTEIN OTHER: CPT

## 2024-08-26 PROCEDURE — 93458 L HRT ARTERY/VENTRICLE ANGIO: CPT

## 2024-08-26 PROCEDURE — 99232 SBSQ HOSP IP/OBS MODERATE 35: CPT | Mod: GC

## 2024-08-26 PROCEDURE — 94640 AIRWAY INHALATION TREATMENT: CPT

## 2024-08-26 PROCEDURE — 85025 COMPLETE CBC W/AUTO DIFF WBC: CPT

## 2024-08-26 PROCEDURE — 83615 LACTATE (LD) (LDH) ENZYME: CPT

## 2024-08-26 PROCEDURE — 93312 ECHO TRANSESOPHAGEAL: CPT

## 2024-08-26 PROCEDURE — 87070 CULTURE OTHR SPECIMN AEROBIC: CPT

## 2024-08-26 PROCEDURE — 83036 HEMOGLOBIN GLYCOSYLATED A1C: CPT

## 2024-08-26 PROCEDURE — 89051 BODY FLUID CELL COUNT: CPT

## 2024-08-26 PROCEDURE — 80048 BASIC METABOLIC PNL TOTAL CA: CPT

## 2024-08-26 PROCEDURE — 99239 HOSP IP/OBS DSCHRG MGMT >30: CPT

## 2024-08-26 PROCEDURE — 71046 X-RAY EXAM CHEST 2 VIEWS: CPT

## 2024-08-26 PROCEDURE — 83880 ASSAY OF NATRIURETIC PEPTIDE: CPT

## 2024-08-26 PROCEDURE — 85027 COMPLETE CBC AUTOMATED: CPT

## 2024-08-26 PROCEDURE — 80061 LIPID PANEL: CPT

## 2024-08-26 PROCEDURE — 80053 COMPREHEN METABOLIC PANEL: CPT

## 2024-08-26 PROCEDURE — 93325 DOPPLER ECHO COLOR FLOW MAPG: CPT

## 2024-08-26 PROCEDURE — 36415 COLL VENOUS BLD VENIPUNCTURE: CPT

## 2024-08-26 PROCEDURE — 93005 ELECTROCARDIOGRAM TRACING: CPT

## 2024-08-26 PROCEDURE — C1887: CPT

## 2024-08-26 PROCEDURE — 83735 ASSAY OF MAGNESIUM: CPT

## 2024-08-26 PROCEDURE — 84443 ASSAY THYROID STIM HORMONE: CPT

## 2024-08-26 PROCEDURE — C1769: CPT

## 2024-08-26 PROCEDURE — 84100 ASSAY OF PHOSPHORUS: CPT

## 2024-08-26 RX ORDER — IBUPROFEN 200 MG
1 TABLET ORAL
Refills: 0 | DISCHARGE

## 2024-08-26 RX ORDER — LOSARTAN POTASSIUM 50 MG/1
0.5 TABLET ORAL
Qty: 15 | Refills: 0
Start: 2024-08-26 | End: 2024-09-24

## 2024-08-26 RX ORDER — SPIRONOLACTONE 25 MG/1
0.5 TABLET, FILM COATED ORAL
Qty: 15 | Refills: 0
Start: 2024-08-26 | End: 2024-09-24

## 2024-08-26 RX ORDER — CARVEDILOL 6.25 MG/1
1 TABLET ORAL
Qty: 60 | Refills: 0
Start: 2024-08-26 | End: 2024-09-24

## 2024-08-26 RX ADMIN — CARVEDILOL 6.25 MILLIGRAM(S): 6.25 TABLET ORAL at 08:49

## 2024-08-26 RX ADMIN — SPIRONOLACTONE 12.5 MILLIGRAM(S): 25 TABLET, FILM COATED ORAL at 05:29

## 2024-08-26 RX ADMIN — Medication 1 PATCH: at 12:16

## 2024-08-26 RX ADMIN — Medication 1 PATCH: at 07:26

## 2024-08-26 RX ADMIN — Medication 1 PATCH: at 12:30

## 2024-08-26 RX ADMIN — LOSARTAN POTASSIUM 12.5 MILLIGRAM(S): 50 TABLET ORAL at 08:48

## 2024-08-26 NOTE — PROGRESS NOTE ADULT - NUTRITIONAL ASSESSMENT
This patient has been assessed with a concern for Malnutrition and has been determined to have a diagnosis/diagnoses of Moderate protein-calorie malnutrition.    This patient is being managed with:   Diet DASH/TLC-  Sodium & Cholesterol Restricted  Entered: Aug 22 2024 10:44AM  

## 2024-08-26 NOTE — CHART NOTE - NSCHARTNOTEFT_GEN_A_CORE
Informed by Dr. Mace patient to be discharged today.  She will be follow up with oncology in pursuit of continued oncologic treatments for her cancer.   Palliative Care to sign off.

## 2024-08-26 NOTE — PROGRESS NOTE ADULT - REASON FOR ADMISSION
Cardiomyopathy

## 2024-08-26 NOTE — DISCHARGE NOTE NURSING/CASE MANAGEMENT/SOCIAL WORK - NSDCPEEMAIL_GEN_ALL_CORE
[facility] for Tobacco Control email [email or web address]

## 2024-08-26 NOTE — PROGRESS NOTE ADULT - PROVIDER SPECIALTY LIST ADULT
Cardiology
Gyn Onc
Hospitalist
Gyn Onc
Hospitalist
Hospitalist
Intervent Cardiology
Cardiology
Hospitalist
Hospitalist
Gyn Onc
Cardiology
Cardiology
Hospitalist

## 2024-08-26 NOTE — CHART NOTE - NSCHARTNOTESELECT_GEN_ALL_CORE
PM Rounds/Event Note
Event Note
Event Note
NP post cath note/Event Note
Palliative/Event Note
Site check post cath/Event Note

## 2024-08-26 NOTE — DISCHARGE NOTE NURSING/CASE MANAGEMENT/SOCIAL WORK - NSDCPEWEB_GEN_ALL_CORE
[facility] for Tobacco Control website --- [email or web address] --- [email or web address]

## 2024-08-26 NOTE — PROVIDER CONTACT NOTE (MEDICATION) - SITUATION
pt. due for losartan 25mg & coreg 6.25mg this morning, parameters are hold <105 SBP.
Patient /58 (73) HR 75. orders for Losartan 25mg PO with no BP parameters, want to clarify if medication should be given

## 2024-08-26 NOTE — PROGRESS NOTE ADULT - ATTENDING COMMENTS
69yoF with above PMHx sent to hospital for new cardiomyopathy seen on outpatient TTE w/ reported EF <25%.  Pt seen by cardiology who wants to trial some GDMT as above w/out loop diuretic given pericardial effusion noted on TTE.  Unclear etiology of cardiomyopathy as pt has not yet started cancer treatment but pt is active smoker so has CAD risk.   Repeat TTE ordered and pending.  IR consult for therapeutic paracentesis given distention noted on exam.  Per outpatient records, received prior paracentesis on 8/9 w/ 2L removed.  Small L-pleural effusion noted on CXR, will hold off on CT surgery consult as appear small but can call if official CXR report notes moderate in size.
69yoF with above PMHx sent to hospital for new cardiomyopathy seen on outpatient TTE w/ reported EF <25%.  Pt seen by cardiology who wants to trial some GDMT as above w/out loop diuretic given pericardial effusion noted on TTE.  Unclear etiology of cardiomyopathy as pt has not yet started cancer treatment but pt is active smoker so has CAD risk.   Repeat TTE ordered and pending.  IR consult for therapeutic paracentesis given distention noted on exam.  Per outpatient records, received prior paracentesis on 8/9 w/ 2L removed.  Small L-pleural effusion noted on CXR, will hold off on CT surgery consult as appear small but can call if official CXR report notes moderate in size.
Patient seen and examined at bedside. Agree with assessment and plan as above:  - 70 y/o F w new Dx of Ovarian CA, at least Stage 3, complicated by new finding of CHF  - Admitted to medicine for evaluation and optimization of hear failure  - Pt s/p C1 Carbo/Taxol on 8/24  - Tolerated well by patient, complains only of mild numbness/tingling of hands, no other issues  - Precautions discussed in detail  - Clear for DC home today from GYN oncology standpoint    Discussed w Dr. Juan Jalloh MD  Advanced Pelvic Surgery Fellow
69yoF with above PMHx sent to hospital for new cardiomyopathy seen on outpatient TTE w/ reported EF <25%.  Pt seen by cardiology who wants to trial some GDMT as above w/out loop diuretic given pericardial effusion noted on TTE.  Unclear etiology of cardiomyopathy as pt has not yet started cancer treatment but pt is active smoker so has CAD risk.   Repeat TTE ordered and pending.  IR consult for therapeutic paracentesis given distention noted on exam.  Per outpatient records, received prior paracentesis on 8/9 w/ 2L removed.  Small L-pleural effusion noted on CXR, will hold off on CT surgery consult as appear small but can call if official CXR report notes moderate in size.
Patient tolerated chemotherapy without allergic reaction and no evidence of heart failure. Will evaluate and discharge home for follow up outpatient. Patients heart failure likely related to fluid overload caused by abdominal ascites, which will only improve once carcinomatosis improves. We discussed with patient that we will plan to evaluate her heart function after 3 cycles of chemotherapy, if cardiac function without improvement above 20%, she will not be a surgical candidate and we will manage her with chemotherapy only. If cardiac function improves with resolution of fluid overload (expected), then we could consider cytoreductive surgery and HIPEC after 3 cycles of neoadjuvant chemotherapy. Anticipate discharge in the following few days and short term outpatient follow up with Dr. Jennings and Cardiology.

## 2024-08-26 NOTE — DISCHARGE NOTE NURSING/CASE MANAGEMENT/SOCIAL WORK - NSFLUVACAGEDISCH_IMM_ALL_CORE
History of Present Illness


Admission Date/PCP: 


07/26/2020   19:48  


RAVEN PATEL MD


Patient complains of: Dyspnea


History of Present Illness: 


CORAL MONTENEGRO is a 80 year old male who presented the emergency room with a 

two-week history of dyspnea.  Patient admits progressively worsening dyspnea 

over the course of the last 2 weeks becoming severe today.  His dyspnea has been

accompanied by a nonproductive cough and associated with a persistent subjective

fever.  His dyspnea is worsened by activity and exertion.  He tested positive 

for COVID-19 on 7/19/2020 and was being treated conservatively at home by his 

primary care provider.  He denies other associated or accompanying signs and sym

ptoms.  He admits prior similar episodes related to his COPD.  He has not 

identified any additional aggravating or ameliorating factors for his dyspnea.  

In the emergency room he was found to be hypoxic requiring supplemental oxygen 

to maintain an adequate O2 saturation.  Chest x-ray showed bilateral pneumonia. 

Patient was subsequently admitted to the hospital for further evaluation and 

treatment on the COVID-19 unit.





Past Medical History


Cardiac Medical History: Reports: Coronary Artery Disease, Myocardial Infarction

- 08/2016, Hyperlipidema, Hypertension, Other - Tachycardia


   Denies: Atrial Fibrillation, Congestive Heart Failure, DVT, Peripheral Vas

cular Disease, Pulmonary Embolism, Heart Murmur


Pulmonary Medical History: Reports: Chronic Obstructive Pulmonary Disease 

(COPD), Sleep Apnea


   Denies: Asthma, Bronchitis, Pneumonia


EENT Medical History: Reports: Cataracts, Eyes - Wears glasses


   Denies: Ears - Hearing aids


Neurological Medical History: 


   Denies: Hemorrhagic CVA, Ischemic CVA, Seizures


Endocrine Medical History: Reports: Hypothyroidism


   Denies: Diabetes Mellitus Type 1, Diabetes Mellitus Type 2, Hyperthyroidism


Renal/ Medical History: 


   Denies: Chronic Kidney Disease, Nephrolithiasis


Malignancy Medical History: Reports: None


GI Medical History: Reports: Diverticulitis, Gastroesophageal Reflux Disease - 

With esophagitis, Other - Adenomatous polyps of the colon


   Denies: Cirrhosis, Crohn's Disease, Hepatitis, Hiatal Hernia, Peptic Ulcer 

Disease, Ulcerative Colitis


Musculoskeltal Medical History: Reports: Arthritis - Multiple joints


   Denies: Gout


Skin Medical History: 


   Denies: Eczema, Psoriasis


Psychiatric Medical History: Reports: Tobacco Dependency


   Denies: Alcohol Dependency, Substance Abuse


Traumatic Medical History: Reports: None


Hematology: 


   Denies: Anemia, Bleeding Tendencies


Infectious Medical History: Reports: None





Past Surgical History


Past Surgical History: Reports: Cardiac Catheterization, Coronary Stent, Other -

EGD & biopsy, colonoscopy & polypectomy, sigmoid resection, cataracts OU





Social History


Information Source: Patient


Lives with: Spouse/Significant other


Smoking Status: Former Smoker


Electronic Cigarette use?: No


Frequency of Alcohol Use: Occasional


Hx Recreational Drug Use: No


Drugs: None


Hx Prescription Drug Abuse: No





- Advance Directive


Resuscitation Status: Full Code


Surrogate healthcare decision maker:: 


Vandana Montenegro





Family History


Family History: denies: CAD, DM, Hypertension, Malignancy


Parental Family History Reviewed: Yes


Children Family History Reviewed: No


Sibling(s) Family History Reviewed.: Yes





Medication/Allergy


Home Medications: 








Metoprolol Succinate 50 mg PO DAILY 09/17/14 


Aspirin [Aspirin 81 mg Chewable Tablet] 81 mg PO DAILY 09/27/17 


Clopidogrel Bisulfate [Clopidogrel] 75 mg PO DAILY 09/27/17 


Nitroglycerin 0.3 mg SL ASDIR PRN 09/27/17 


Tiotropium Bromide [Spiriva] 18 mcg IH DAILY 09/27/17 


Atorvastatin Calcium [Lipitor 40 mg Tablet] 40 mg PO QHS 09/28/17 


Famotidine [Pepcid 20 mg Tablet] 20 mg PO DAILY #30 tablet 09/28/17 


Ondansetron HCl [Zofran 8 mg Tablet] 8 mg PO Q8HP PRN #30 tablet 09/28/17 








Allergies/Adverse Reactions: 


                                        





No Known Allergies Allergy (Verified 07/16/20 07:23)


   











Review of Systems


Constitutional: PRESENT: as per HPI, fever(s).  ABSENT: chills


Eyes: ABSENT: visual disturbances, other - Eye pain


Ears: ABSENT: hearing changes, other - Ear pain low none


Nose, Mouth, and Throat: ABSENT: headache(s) - Wound wound, sore throat


Cardiovascular: PRESENT: as per HPI, dyspnea on exertion.  ABSENT: chest pain, 

edema, orthropnea, palpitations


Respiratory: PRESENT: cough, dyspnea.  ABSENT: hemoptysis, sputum


Gastrointestinal: ABSENT: abdominal pain, constipation, diarrhea, nausea, 

vomiting


Genitourinary: ABSENT: dysuria, hematuria


Musculoskeletal: ABSENT: back pain, joint swelling


Integumentary: ABSENT: pruritus, rash


Neurological: ABSENT: confusion, convulsions, focal weakness, memory loss, 

syncope


Psychiatric: ABSENT: anxiety, depression


Endocrine: ABSENT: cold intolerance, heat intolerance


Hematologic/Lymphatic: ABSENT: easy bleeding, easy bruising


Allergic/Immunologic: ABSENT: seasonal rhinorrhea





Physical Exam


Vital Signs: 


                                        











Temp Pulse Resp BP Pulse Ox


 


 97.9 F   101 H  12   116/63   89 L


 


 07/26/20 17:20  07/26/20 17:43  07/26/20 19:01  07/26/20 19:01  07/26/20 19:01








                                 Intake & Output











 07/24/20 07/25/20 07/26/20





 23:59 23:59 23:59


 


Weight   82.554 kg











General appearance: PRESENT: cooperative, mild distress - Secondary to dyspnea


Head exam: PRESENT: atraumatic, normocephalic


Eye exam: PRESENT: conjunctiva pink.  ABSENT: conjunctival injection, scleral 

icterus


Ear exam: PRESENT: normal external ear exam.  ABSENT: bleeding, drainage


Mouth exam: PRESENT: dry mucosa, neck supple


Neck exam: ABSENT: thyromegaly, tracheal deviation


Respiratory exam: PRESENT: accessory muscle use, decreased breath sounds - 

Mildly decreased breath sounds throughout all fields, prolonged expiratory phas 

- Moderately prolonged expiratory phase, symmetrical, tachypnea, wheezes - 

Moderate expiratory wheezes in all fields.  ABSENT: rales, rhonchi


Cardiovascular exam: PRESENT: RRR.  ABSENT: clicks, gallop, rubs


Pulses: PRESENT: normal radial pulses, normal dorsalis pedis pul


Vascular exam: PRESENT: normal capillary refill.  ABSENT: pallor


GI/Abdominal exam: PRESENT: normal bowel sounds, soft.  ABSENT: tenderness


Rectal exam: PRESENT: deferred


Extremities exam: ABSENT: joint swelling, pedal edema


Musculoskeletal exam: ABSENT: deformity, dislocation


Neurological exam: PRESENT: alert, oriented to person, oriented to place, 

oriented to time, oriented to situation, CN II-XII grossly intact.  ABSENT: 

motor sensory deficit


Psychiatric exam: PRESENT: appropriate affect, normal mood


Skin exam: PRESENT: dry, intact, warm.  ABSENT: jaundice, rash, urticaria





Results


Laboratory Results: 


                                        





                                 07/26/20 17:40 





                                 07/26/20 17:40 





                                        











  07/26/20 07/26/20





  17:40 17:40


 


WBC  8.5 


 


RBC  4.36 


 


Hgb  14.4 


 


Hct  41.2 


 


MCV  95 


 


MCH  33.0 


 


MCHC  34.9 


 


RDW  14.0 


 


Plt Count  295 


 


Seg Neutrophils %  80.9 H 


 


Sodium   132.5 L


 


Potassium   4.6


 


Chloride   103


 


Carbon Dioxide   20 L


 


Anion Gap   10


 


BUN   23 H


 


Creatinine   1.24


 


Est GFR (African Amer)   > 60


 


Glucose   117 H


 


Calcium   9.2


 


Total Bilirubin   1.0


 


AST   53


 


Alkaline Phosphatase   104


 


Total Protein   7.5


 


Albumin   3.6








                                        











  07/26/20





  17:40


 


Troponin I  < 0.012


 


NT-Pro-B Natriuret Pep  180











Impressions: 


                                        





Chest X-Ray  07/26/20 18:17


IMPRESSION:  Worsening areas of consolidation in both lungs.


 














Assessment and Plan





- Diagnosis


(1) Pneumonia due to COVID-19 virus


Is this a current diagnosis for this admission?: Yes   





(2) Acute infective exacerbation of chronic obstructive airway disease


Is this a current diagnosis for this admission?: Yes   





(3) Acute respiratory failure with hypoxia


Is this a current diagnosis for this admission?: Yes   





(4) Obstructive sleep apnea


Is this a current diagnosis for this admission?: Yes   





(5) Coronary artery disease


Qualifiers: 


   Coronary Disease-Associated Artery/Lesion type: native artery   Native vs. 

transplanted heart: native heart   Associated angina: without angina   Qualified

Code(s): I25.10 - Atherosclerotic heart disease of native coronary artery 

without angina pectoris   


Is this a current diagnosis for this admission?: Yes   





(6) Hypertension


Qualifiers: 


   Hypertension type: essential hypertension   Qualified Code(s): I10 - 

Essential (primary) hypertension   


Is this a current diagnosis for this admission?: Yes   





(7) Hyperlipidemia


Qualifiers: 


   Hyperlipidemia type: unspecified   Qualified Code(s): E78.5 - Hyperlipidemia,

unspecified   


Is this a current diagnosis for this admission?: Yes   





- Plan Summary


Summary: 


Patient will be admitted to the [facility]-19 unit as a medical floor patient where 

he will receive routine supportive and symptomatic cares.  He will be treated 

with intravenous dexamethasone 2 mg every 8 hours.  He will receive Lovenox 1 

mg/kg subcutaneously every 12 hours.  He will receive intravenous Rocephin and 

azithromycin.  He will receive an aggressive pulmonary toilet utilizing Xopenex,

Atrovent and Pulmicort.  He will receive supplemental oxygen via nasal cannula 

and/or noninvasive airway pressure support devices such as BiPAP in order to 

maintain his oxygen saturation at a satisfactory level and ease his work of 

breathing.  A d-dimer, lipid profile, COVID-19 profile and thyroid profile will 

be obtained.  CBCs, metabolic profiles, magnesium levels and additional labor

atory and/or radiographic evaluations will be obtained as appropriate.  Patient 

will use Ativan 1 mg IV every 4 hours as needed for anxiety or restlessness.  He

will use morphine sulfate 2 to 4 mg IV every 2 hours as needed for pain.  He 

will receive lactated Ringer's solution IV at 125 mL/h for the first 24 hours 

and then fluid therapy will be reassessed.  He will be placed on a cardiac diet.





- Time


Time Spent with patient: 15-24 minutes


Medications reviewed and adjusted accordingly: Yes


Anticipated Discharge Disposition: Home with Home Health


Anticipated Discharge: Other - Undetermined





- Inpatient Certification


Based on my medical assessment, after consideration of the patient's 

comorbidities, presenting symptoms, or acuity I expect that the services needed 

warrant INPATIENT care.: Yes


I certify that my determination is in accordance with my understanding of 

Medicare's requirements for reasonable and necessary INPATIENT services [42 CFR 

412.3e].: Yes


Medical Necessity: Failure to Improve With Outpatient Therapy, Significant 

Comorbidiites Make Outpatient Treatment Too Risky, Need Close Monitoring Due to 

Risk of Patient Decompensation, Need for Nebulizer Therapy and Monitoring of 

Response, Need for IV Antibiotics, Risk of Complication if Not Cared For in 

Hospital
Adult

## 2024-08-26 NOTE — DISCHARGE NOTE NURSING/CASE MANAGEMENT/SOCIAL WORK - PATIENT PORTAL LINK FT
You can access the FollowMyHealth Patient Portal offered by [facility] by registering at the following website: [email or web address]. By joining [employer]’s FollowMyHealth portal, you will also be able to view your health information using other applications (apps) compatible with our system.

## 2024-08-26 NOTE — PROGRESS NOTE ADULT - SUBJECTIVE AND OBJECTIVE BOX
CC: Follow up     INTERVAL HPI/OVERNIGHT EVENTS:  Patient seen and examined, no acute complaints overnight.       Vital Signs Last 24 Hrs  T(C): 36.8 (26 Aug 2024 12:09), Max: 36.8 (25 Aug 2024 21:52)  T(F): 98.3 (26 Aug 2024 12:09), Max: 98.3 (25 Aug 2024 21:52)  HR: 74 (26 Aug 2024 12:09) (70 - 78)  BP: 131/66 (26 Aug 2024 12:09) (102/57 - 131/66)  BP(mean): --  RR: 17 (26 Aug 2024 05:09) (17 - 18)  SpO2: 93% (26 Aug 2024 12:09) (93% - 95%)    Parameters below as of 26 Aug 2024 12:09  Patient On (Oxygen Delivery Method): room air        PHYSICAL EXAM:    GENERAL: NAD, AOX3  HEAD:  Atraumatic, Normocephalic  EYES:  conjunctiva and sclera clear  CHEST/LUNG: Clear to auscultation bilaterally; No rales, rhonchi, wheezing, or rubs  HEART: Regular rate and rhythm; No murmurs, rubs, or gallops  ABDOMEN: Soft, Nontender, Nondistended; Bowel sounds present  EXTREMITIES:  2+ Peripheral Pulses, No clubbing, cyanosis, or edema        MEDICATIONS  (STANDING):  carvedilol 6.25 milliGRAM(s) Oral every 12 hours  losartan 12.5 milliGRAM(s) Oral daily  nicotine -  14 mG/24Hr(s) Patch 1 Patch Transdermal daily  spironolactone 12.5 milliGRAM(s) Oral daily    MEDICATIONS  (PRN):  acetaminophen     Tablet .. 650 milliGRAM(s) Oral every 6 hours PRN Temp greater or equal to 38C (100.4F), Mild Pain (1 - 3)  albuterol    90 MICROgram(s) HFA Inhaler 2 Puff(s) Inhalation every 6 hours PRN for shortness of breath and/or wheezing  aluminum hydroxide/magnesium hydroxide/simethicone Suspension 30 milliLiter(s) Oral every 4 hours PRN Dyspepsia  melatonin 3 milliGRAM(s) Oral at bedtime PRN Insomnia  morphine  - Injectable 2 milliGRAM(s) IV Push every 6 hours PRN Severe Pain (7 - 10)  ondansetron Injectable 4 milliGRAM(s) IV Push every 8 hours PRN Nausea and/or Vomiting  oxycodone    5 mG/acetaminophen 325 mG 1 Tablet(s) Oral every 6 hours PRN Moderate Pain (4 - 6)      Allergies    No Known Allergies    Intolerances          LABS:                          10.7   8.73  )-----------( 582      ( 26 Aug 2024 06:04 )             33.7     08-26    134<L>  |  100  |  18.1  ----------------------------<  88  5.3   |  26.0  |  0.44<L>    Ca    8.2<L>      26 Aug 2024 06:04    TPro  5.6<L>  /  Alb  2.4<L>  /  TBili  <0.2<L>  /  DBili  x   /  AST  28  /  ALT  13  /  AlkPhos  80  08-26      Urinalysis Basic - ( 26 Aug 2024 06:04 )    Color: x / Appearance: x / SG: x / pH: x  Gluc: 88 mg/dL / Ketone: x  / Bili: x / Urobili: x   Blood: x / Protein: x / Nitrite: x   Leuk Esterase: x / RBC: x / WBC x   Sq Epi: x / Non Sq Epi: x / Bacteria: x        RADIOLOGY & ADDITIONAL TESTS:

## 2024-08-26 NOTE — PROGRESS NOTE ADULT - ASSESSMENT
A/P: 69 F PMHx peritoneal carcinomatosis, current smoker sent to [facility] ED from cardiologist office for pericardial effusion and EF <25 on TTE. Known to gyn onc service as relatively new patient due to recent finding of peritoneal carcinomatosis.     #New onset HFrEF w/ Pericardial effusion   - Reduced EF on outpatient and inpatient TTE to 25%  - MARYAM & LHC (8/23): NICM, plan for continued medical management  - C/w spironolactone, losartan, coreg as per cardiology  - Care per cardiology and primary team    #Peritoneal carcinomatosis with bilateral adnexal masses  -Patient presented to [facility] ED with distention/pain, was found to have carcinomatosis and adnexal masses, she followed up with Dr Martinez outpatient on 7/31 for presumed advanced GYN related cancer  -IR biopsy of carcinomatosis done outpatient 8/9 revealing "positive for metastatic carcinoma, most consistent with Mullerian GYN primary"  -Inpatient carbo/taxol given 8/24, will plan for chemo q21 days for 3 cycles, followed by repeat cardiac evaluation and shared decision making for continuation of chemotherapy alone vs chemo with surgical management. Patient reports tolerated first cycle with minimal side effects.  -Pt will follow up with Dr Jennings outpatient

## 2024-08-26 NOTE — PROGRESS NOTE ADULT - ASSESSMENT
69 F PMHx peritoneal carcinoma, current smoker sent to [facility] ED from cardiologist office for new cardiomyopathy with reported EF <25 on TTE, also w/ reported pericardial effusion. S/p Paracentesis of 5L. Found to have linear mobile echodensity approx 1.5cm in length.       Plan:  New onset HFrEF w/ Pericardial effusion   -Unclear etiology for new cardiomyopathy  - Chest X-ray showed cardiomegaly and small left sided pleural effusion  - ECG showed ST w/ PVCs similar to previous ECG from 8/5  - TTE with low EF 25-30%, linear mobile echo attached to subvalvular apparatus approx 1.5cm in length, could be partially ruptured chorda tendinae vs less likely a small old vegetation.   - LCH with NICM  - GMDT:  spironolactone, losartan, coreg. Loop diuretics held in the setting of pericardial effusion  - Status post MARYAM: EF of 25-30% ;  small mobile echodensity attached to the papillary muscle which represents a calcified ruptured chordae. Mild non-mobile focal atheroma which measures up to 2.30 mm in the visualized portions of the descending aorta.; Small pericardial effusion noted adjacent to the right atrium, small pericardial effusion in the transverse sinus adjacent to the BLAKE and small to moderate sized pericardial effusion noted adjacent to the posterolateral left ventricle with no evidence of hemodynamic compromise (or echocardiographic evidence of cardiac tamponade).    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 To follow up with oncology for outpatient chemo     Thrombocytosis  -Chronic, likely reactive from malignancy, monitor    Active smoker  -Nicotine patch    DVT: SCDS      Medically stable for discharge home with home care. Discussed with RN, CM Gyn/onc and cardiology

## 2024-08-26 NOTE — PROGRESS NOTE ADULT - SUBJECTIVE AND OBJECTIVE BOX
Prior to injection the patient's identity is verified using patient's name and date of birth.    New safety vial test is done in the right arm for new blue  vial number 1.      SVT is done using 0.01 ml of serum, (ID) into the right arm.    After 10 minutes of observation, SVT result is 13 mm.    The patient failed SVT.  Benadryl and ice are applied to the injection site.     Their allergy injection/injections are held today due to failed SVT on vial number 1.    They will return in 1 week for repeat SVT.  They are told to use antihistamines as directed by ENT.    The patient verbalized understanding, and agrees with this plan.    Ana Luisa Khan RN       69 F PMHx peritoneal carcinomatosis, current smoker sent to [facility] ED from cardiologist office for pericardial effusion and EF <25 on TTE. Known to gyn onc service as relatively new patient due to recent finding of peritoneal carcinomatosis.     SUBJECTIVE:  Reports tolerated chemo well over the weekend. Reports tingling in fingertips that started yesterday.  No acute events or concerns overnight.  Patient denies CP, SOB, dizziness, n/v, diarrhea.  Tolerating PO, ambulating, spontaneously voiding.     OBJECTIVE:  Vital Signs Last 24 Hrs  T(C): 36.7 (26 Aug 2024 05:09), Max: 36.8 (25 Aug 2024 21:52)  T(F): 98 (26 Aug 2024 05:09), Max: 98.3 (25 Aug 2024 21:52)  HR: 76 (26 Aug 2024 05:09) (70 - 76)  BP: 102/57 (26 Aug 2024 05:09) (102/57 - 118/62)  BP(mean): --  RR: 17 (26 Aug 2024 05:09) (17 - 18)  SpO2: 95% (26 Aug 2024 05:09) (93% - 95%)    Parameters below as of 26 Aug 2024 05:09  Patient On (Oxygen Delivery Method): room air        PHYSICAL EXAM:  GENERAL: NAD, well-developed, thin  HEAD:  Atraumatic, Normocephalic  NERVOUS SYSTEM:  Alert & Oriented X3, Good concentration  CHEST/LUNG: Clear to auscultation bilaterally; No rales, rhonchi, wheezing, or rubs  HEART: Regular rate and rhythm; No murmurs, rubs, or gallops  ABDOMEN: Soft, Nondistended. Nontender. Bowel sounds present, No rebound, No guarding.   EXTREMITIES: No calf tenderness bilaterally    LABS:                         10.8   9.26  )-----------( 619      ( 25 Aug 2024 05:58 )             33.6       08-25    134<L>  |  99  |  15.6  ----------------------------<  161<H>  5.3   |  24.0  |  0.60    Ca    8.7      25 Aug 2024 05:58    TPro  5.7<L>  /  Alb  2.5<L>  /  TBili  <0.2<L>  /  DBili  x   /  AST  16  /  ALT  11  /  AlkPhos  101  08-25

## 2024-08-29 ENCOUNTER — EMERGENCY (EMERGENCY)
Facility: HOSPITAL | Age: 69
LOS: 1 days | Discharge: HOME | End: 2024-08-29
Attending: STUDENT IN AN ORGANIZED HEALTH CARE EDUCATION/TRAINING PROGRAM
Payer: COMMERCIAL

## 2024-08-29 VITALS
SYSTOLIC BLOOD PRESSURE: 101 MMHG | HEART RATE: 94 BPM | DIASTOLIC BLOOD PRESSURE: 62 MMHG | OXYGEN SATURATION: 95 % | RESPIRATION RATE: 18 BRPM | TEMPERATURE: 99 F

## 2024-08-29 VITALS
DIASTOLIC BLOOD PRESSURE: 67 MMHG | TEMPERATURE: 98 F | HEART RATE: 96 BPM | SYSTOLIC BLOOD PRESSURE: 111 MMHG | HEIGHT: 64 IN | RESPIRATION RATE: 20 BRPM | OXYGEN SATURATION: 96 % | WEIGHT: 110.01 LBS

## 2024-08-29 LAB
ANION GAP SERPL CALC-SCNC: 10 MMOL/L — SIGNIFICANT CHANGE UP (ref 5–17)
BASOPHILS # BLD AUTO: 0.02 K/UL — SIGNIFICANT CHANGE UP (ref 0–0.2)
BASOPHILS NFR BLD AUTO: 0.3 % — SIGNIFICANT CHANGE UP (ref 0–2)
BUN SERPL-MCNC: 20.4 MG/DL — HIGH (ref 8–20)
CALCIUM SERPL-MCNC: 9.1 MG/DL — SIGNIFICANT CHANGE UP (ref 8.4–10.5)
CHLORIDE SERPL-SCNC: 98 MMOL/L — SIGNIFICANT CHANGE UP (ref 96–108)
CO2 SERPL-SCNC: 24 MMOL/L — SIGNIFICANT CHANGE UP (ref 22–29)
CREAT SERPL-MCNC: 0.55 MG/DL — SIGNIFICANT CHANGE UP (ref 0.5–1.3)
EGFR: 98 ML/MIN/1.73M2 — SIGNIFICANT CHANGE UP
EOSINOPHIL # BLD AUTO: 0.09 K/UL — SIGNIFICANT CHANGE UP (ref 0–0.5)
EOSINOPHIL NFR BLD AUTO: 1.2 % — SIGNIFICANT CHANGE UP (ref 0–6)
GLUCOSE SERPL-MCNC: 131 MG/DL — HIGH (ref 70–99)
HCT VFR BLD CALC: 39.4 % — SIGNIFICANT CHANGE UP (ref 34.5–45)
HGB BLD-MCNC: 12.8 G/DL — SIGNIFICANT CHANGE UP (ref 11.5–15.5)
IMM GRANULOCYTES NFR BLD AUTO: 1.2 % — HIGH (ref 0–0.9)
LYMPHOCYTES # BLD AUTO: 0.78 K/UL — LOW (ref 1–3.3)
LYMPHOCYTES # BLD AUTO: 10.7 % — LOW (ref 13–44)
MCHC RBC-ENTMCNC: 28.5 PG — SIGNIFICANT CHANGE UP (ref 27–34)
MCHC RBC-ENTMCNC: 32.5 GM/DL — SIGNIFICANT CHANGE UP (ref 32–36)
MCV RBC AUTO: 87.8 FL — SIGNIFICANT CHANGE UP (ref 80–100)
MONOCYTES # BLD AUTO: 0.04 K/UL — SIGNIFICANT CHANGE UP (ref 0–0.9)
MONOCYTES NFR BLD AUTO: 0.6 % — LOW (ref 2–14)
NEUTROPHILS # BLD AUTO: 6.25 K/UL — SIGNIFICANT CHANGE UP (ref 1.8–7.4)
NEUTROPHILS NFR BLD AUTO: 86 % — HIGH (ref 43–77)
PLATELET # BLD AUTO: 574 K/UL — HIGH (ref 150–400)
POTASSIUM SERPL-MCNC: 5.6 MMOL/L — HIGH (ref 3.5–5.3)
POTASSIUM SERPL-SCNC: 5.6 MMOL/L — HIGH (ref 3.5–5.3)
RBC # BLD: 4.49 M/UL — SIGNIFICANT CHANGE UP (ref 3.8–5.2)
RBC # FLD: 12.8 % — SIGNIFICANT CHANGE UP (ref 10.3–14.5)
SODIUM SERPL-SCNC: 132 MMOL/L — LOW (ref 135–145)
WBC # BLD: 7.27 K/UL — SIGNIFICANT CHANGE UP (ref 3.8–10.5)
WBC # FLD AUTO: 7.27 K/UL — SIGNIFICANT CHANGE UP (ref 3.8–10.5)

## 2024-08-29 PROCEDURE — 36415 COLL VENOUS BLD VENIPUNCTURE: CPT

## 2024-08-29 PROCEDURE — 80048 BASIC METABOLIC PNL TOTAL CA: CPT

## 2024-08-29 PROCEDURE — 96374 THER/PROPH/DIAG INJ IV PUSH: CPT

## 2024-08-29 PROCEDURE — 85025 COMPLETE CBC W/AUTO DIFF WBC: CPT

## 2024-08-29 PROCEDURE — 99284 EMERGENCY DEPT VISIT MOD MDM: CPT

## 2024-08-29 PROCEDURE — 99284 EMERGENCY DEPT VISIT MOD MDM: CPT | Mod: 25

## 2024-08-29 RX ORDER — ONDANSETRON 8 MG/1
8 TABLET, ORALLY DISINTEGRATING ORAL EVERY 8 HOURS
Qty: 90 | Refills: 3 | Status: ACTIVE | COMMUNITY
Start: 2024-08-29 | End: 1900-01-01

## 2024-08-29 RX ORDER — ONDANSETRON 2 MG/ML
4 INJECTION, SOLUTION INTRAMUSCULAR; INTRAVENOUS ONCE
Refills: 0 | Status: COMPLETED | OUTPATIENT
Start: 2024-08-29 | End: 2024-08-29

## 2024-08-29 RX ORDER — SODIUM CHLORIDE 9 MG/ML
1000 INJECTION INTRAMUSCULAR; INTRAVENOUS; SUBCUTANEOUS ONCE
Refills: 0 | Status: COMPLETED | OUTPATIENT
Start: 2024-08-29 | End: 2024-08-29

## 2024-08-29 RX ORDER — PROCHLORPERAZINE MALEATE 10 MG/1
10 TABLET ORAL EVERY 6 HOURS
Qty: 120 | Refills: 5 | Status: ACTIVE | COMMUNITY
Start: 2024-08-29 | End: 1900-01-01

## 2024-08-29 RX ADMIN — SODIUM CHLORIDE 1000 MILLILITER(S): 9 INJECTION INTRAMUSCULAR; INTRAVENOUS; SUBCUTANEOUS at 15:36

## 2024-08-29 RX ADMIN — ONDANSETRON 4 MILLIGRAM(S): 2 INJECTION, SOLUTION INTRAMUSCULAR; INTRAVENOUS at 15:37

## 2024-08-29 NOTE — ED ADULT NURSE NOTE - OBJECTIVE STATEMENT
Pt presents to the ED A&Ox4 c/o V since yesterday. Pt reports PMH of ovarian CA, with recent chemo beginning this past saturday. Pt RR even and unlabored. Pt denies any pain and SOB. Pt denies dizziness.

## 2024-08-29 NOTE — ED PROVIDER NOTE - OBJECTIVE STATEMENT
Pt is a 68 yo F co vomiting.  PMHx significant for htn, ovarian CA. Pt started on chemotherapy in the hospital this past saturday. Pt was sent home without any antinausea medicaiton. Pt states that she started with n/v yesterday and it was worse today so they told her to come to the ER. no cp. no sob. no abd pain. no other complaints.

## 2024-08-29 NOTE — ED PROVIDER NOTE - PATIENT PORTAL LINK FT
You can access the FollowMyHealth Patient Portal offered by [facility] by registering at the following website: [email or web address]. By joining [employer]’s FollowMyHealth portal, you will also be able to view your health information using other applications (apps) compatible with our system.

## 2024-08-29 NOTE — ED ADULT NURSE NOTE - HOW PATIENT ADDRESSED, PROFILE
PATIENT COMPLAINING OF FEELING JITTERY AFTER TAKING DECADRON AND PER  ORDER
PATIENT GIVEN 0.5MG OF XANAX FOR ANXIETY AT THIS TIME. PATIENT REMINDED NOT TO
GET UP WITHOUT ASSISTANCE. BED ALARM ON AND PATIENT ROOM BEING MONITORED BY
[facility]. SIDERAILS ARE UP X 3 CALL LIGHT IS WITHIN REACH.
. erasto

## 2024-08-29 NOTE — ED ADULT TRIAGE NOTE - CHIEF COMPLAINT QUOTE
BIBEM from home C/O nausea and vomiting for th past day. PT states she had chemo done yesterday but has never had this reaction. Denies pain. Hx Gastric CA.

## 2024-08-29 NOTE — ED PROVIDER NOTE - CLINICAL SUMMARY MEDICAL DECISION MAKING FREE TEXT BOX
labs reviewed.  pt with minimally elevated potassium but patient was hydrated with 1 L of NS in ED which will help potassium. Pt feeling better.  Pt and family reassured. will d/c with outpatient f/up to her oncologist. pt now has rx for prochlorperazine and ondansetron.  given return instructions.  Pt given a copy of all results and instructed to f/up with pcp regarding any abnormal results.

## 2024-08-29 NOTE — ED PROVIDER NOTE - NS ED ROS FT
No fever/chills   No photophobia/eye pain/changes in visio,   No ear pain/sore throat/dysphagia   No chest pain/palpitations  No SOB/cough/wheeze/stridor   No abdominal pain, + N/V no diarrhea  No dysuria/frequency/discharge   No neck/back pain,   No rash  No changes in neurological status/function.

## 2024-08-30 ENCOUNTER — NON-APPOINTMENT (OUTPATIENT)
Age: 69
End: 2024-08-30

## 2024-09-03 ENCOUNTER — RESULT REVIEW (OUTPATIENT)
Age: 69
End: 2024-09-03

## 2024-09-03 ENCOUNTER — APPOINTMENT (OUTPATIENT)
Dept: HEMATOLOGY ONCOLOGY | Facility: CLINIC | Age: 69
End: 2024-09-03

## 2024-09-09 ENCOUNTER — APPOINTMENT (OUTPATIENT)
Dept: INTERNAL MEDICINE | Facility: CLINIC | Age: 69
End: 2024-09-09

## 2024-09-10 ENCOUNTER — RESULT REVIEW (OUTPATIENT)
Age: 69
End: 2024-09-10

## 2024-09-10 ENCOUNTER — APPOINTMENT (OUTPATIENT)
Dept: GYNECOLOGIC ONCOLOGY | Facility: CLINIC | Age: 69
End: 2024-09-10
Payer: MEDICARE

## 2024-09-10 ENCOUNTER — APPOINTMENT (OUTPATIENT)
Dept: HEMATOLOGY ONCOLOGY | Facility: CLINIC | Age: 69
End: 2024-09-10

## 2024-09-10 VITALS
WEIGHT: 113.21 LBS | SYSTOLIC BLOOD PRESSURE: 124 MMHG | DIASTOLIC BLOOD PRESSURE: 70 MMHG | TEMPERATURE: 96.9 F | HEIGHT: 63 IN | OXYGEN SATURATION: 100 % | HEART RATE: 87 BPM | BODY MASS INDEX: 20.06 KG/M2

## 2024-09-10 PROCEDURE — G2211 COMPLEX E/M VISIT ADD ON: CPT

## 2024-09-10 PROCEDURE — 99214 OFFICE O/P EST MOD 30 MIN: CPT

## 2024-09-10 RX ORDER — DEXAMETHASONE 4 MG/1
4 TABLET ORAL
Qty: 30 | Refills: 0 | Status: ACTIVE | COMMUNITY
Start: 2024-09-10 | End: 1900-01-01

## 2024-09-10 NOTE — HISTORY OF PRESENT ILLNESS
[de-identified] : 69 year old female presents today with newly diagnosed b/l adnexal masses and extensive peritoneal carcinomatosis found on CT imaging.  Patient originally presents to [facility] ED on 7/26/24 for worsening abdominal distention and pain x 1 month with associated constipation. CT revealed Bilateral adnexal masses measuring 3.0 x 2.0 cm on the right and 5.7 x 1.0 cm on the left. Uterus is unremarkable. Moderate ascites. Extensive peritoneal carcinomatosis. Patient was discharged with GYN ONC follow up. 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 S/p paracentesis 8/9, 2000cc drained. Dr Martinez referred to med onc for neoadjuvant chemotherapy.  Blood work 7/2024: : 2099 CA 19-9: 18 CEA: 1.9 HE4: 1345.0  CT A/P 7/26/2024: Bilateral adnexal masses measuring 3.0 x 2.0 cm on the right and 5.7 x 1.0 cm on the left. Uterus is unremarkable. Moderate ascites. Extensive peritoneal carcinomatosis. Upper abdominal mass 7.1 x 4.8 cm. Right mid abdominal mass 5.3 x 4.1 cm. Lower abdominal mass 6.2 x 5.3 cm. Wall thickening of the sigmoid colon, which may reflect the presence of serosal implants. Nodularity in the cul-de-sac and along the paracolic gutters, as well as along the bladder dome. No lymphadenopathy.  Final Diagnosis Omental biopsy: Positive for metastatic carcinoma, most consistent with Mullerian GYN primary.  See note. Note: Immunohistochemical stains: Positive: mutation type p53, p16, PAX8, ER, Ki-67 proliferative marker approximately 80% Negative: Progesterone receptor, CK20, p63, GATA3 [de-identified] : Metastatic carcinoma of Mullerian origin [de-identified] : Ms. Basurto is a 68yo w/ Stage BARBARA carcinoma of Mullerian origin currently undergoing neoadjuvant treatment with Carboplatin AUC 5 / Paclitaxel 175mg/m2, s/p C1 (8/24/24) who presents today for follow-up.  Of note, pt w/ recent hospitalization secondary to new found cardiomyopathy (HFrEF - EF25%), s/p LHC (8/23/24) NICM, started on Spironolactone, Losartan and Coreg and discharged with outpt Cardiology follow-up. While inpatient, pt received chemotherapy (Carbo AUC 5 / Taxol 175mg/m2) on 8/24/24 and also underwent paracentesis for 5L on 8/21.   Overall, pt reports feeling relatively well today. Plan for port insertion 9/11/24.  + Significant nausea/emesis after C1; occurred ~3-4d after chemo, lasted til about C7; Took some anti-emetics with some relief + Diarrhea, took Imodium with good relief + Endorses pain/tingling in right arm after chemo infusion on that arm + Mild numbness/tingling in hands  Pt has h/o ocular migraines? denies pain, just has floaters, feels this has been exacerbated since chemotherapy. Does not have ophthalmologist. Denies HA/vis changes. [FreeTextEntry3] : Nausea/emesis

## 2024-09-10 NOTE — PHYSICAL EXAM
[Restricted in physically strenuous activity but ambulatory and able to carry out work of a light or sedentary nature] : Status 1- Restricted in physically strenuous activity but ambulatory and able to carry out work of a light or sedentary nature, e.g., light house work, office work [Thin] : thin [Normal] : RRR, normal S1S2, no murmurs, rubs, gallops [de-identified] : Mild abdominal distension, soft, non-tender, + BS; +fluid wave

## 2024-09-10 NOTE — ASSESSMENT
[FreeTextEntry1] : Ms. Basurto is a 68yo w/ Stage BARBARA carcinoma of Mullerian origin currently undergoing neoadjuvant treatment with Carboplatin AUC 5 / Paclitaxel 175mg/m2, s/p C1 (8/24/24) who presents today for follow-up. Pt overall doing moderately well, tolerated chemotherapy relatively well. G1/2 nausea, likely secondary to absence of prechemo meds/anti-emetics at home.. Plan to proceed with C2 (9/13/24) at same dose pending prechemo labs. Will make sure pt takes prechemo meds

## 2024-09-10 NOTE — PLAN
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 - IR biopsy (8/9/24) demonstrated metastatic carcinoma of Mullerian origin - Plan: Carbo AUC 5 / Taxol 175mg/m2 D1 q21d x 3C followed by repeat imaging +/- interval debulk with HIPEC followed by at least 2-3C adjuvant treatment - S/p C1 Carbo/Taxol (8/24/24) inpatient; Plan for C2 (sched 9/16/24) pending prechemo labs - RTO ~10-15d after infusion to assess tolerance  #HFrEF - Newly diagnosed s/p ECHO/LHC (8/23/24) w/ EF25% and NICM - Remains on Spironolactone, Losartan, Coreg - Continue f/u with Cardiology  #Ascites - S/p IR drainage 2L on 08/09/24; 5L on 8/21/24 - CTM and drain PRN  #Active tobacco use - Discussed quitting; pt in pre-contemplative phase - Concerns regarding Bevacizumab in active tobacco user, will not use at this time (as well as concern for sigmoid colon involvement on CT 7/26/24)

## 2024-09-10 NOTE — PHYSICAL EXAM
[Restricted in physically strenuous activity but ambulatory and able to carry out work of a light or sedentary nature] : Status 1- Restricted in physically strenuous activity but ambulatory and able to carry out work of a light or sedentary nature, e.g., light house work, office work [Thin] : thin [Normal] : RRR, normal S1S2, no murmurs, rubs, gallops [de-identified] : Mild abdominal distension, soft, non-tender, + BS; +fluid wave

## 2024-09-10 NOTE — HISTORY OF PRESENT ILLNESS
[de-identified] : 69 year old female presents today with newly diagnosed b/l adnexal masses and extensive peritoneal carcinomatosis found on CT imaging.  Patient originally presents to [facility] ED on 7/26/24 for worsening abdominal distention and pain x 1 month with associated constipation. CT revealed Bilateral adnexal masses measuring 3.0 x 2.0 cm on the right and 5.7 x 1.0 cm on the left. Uterus is unremarkable. Moderate ascites. Extensive peritoneal carcinomatosis. Patient was discharged with GYN ONC follow up. 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 S/p paracentesis 8/9, 2000cc drained. Dr Martinez referred to med onc for neoadjuvant chemotherapy.  Blood work 7/2024: : 2099 CA 19-9: 18 CEA: 1.9 HE4: 1345.0  CT A/P 7/26/2024: Bilateral adnexal masses measuring 3.0 x 2.0 cm on the right and 5.7 x 1.0 cm on the left. Uterus is unremarkable. Moderate ascites. Extensive peritoneal carcinomatosis. Upper abdominal mass 7.1 x 4.8 cm. Right mid abdominal mass 5.3 x 4.1 cm. Lower abdominal mass 6.2 x 5.3 cm. Wall thickening of the sigmoid colon, which may reflect the presence of serosal implants. Nodularity in the cul-de-sac and along the paracolic gutters, as well as along the bladder dome. No lymphadenopathy.  Final Diagnosis Omental biopsy: Positive for metastatic carcinoma, most consistent with Mullerian GYN primary.  See note. Note: Immunohistochemical stains: Positive: mutation type p53, p16, PAX8, ER, Ki-67 proliferative marker approximately 80% Negative: Progesterone receptor, CK20, p63, GATA3 [de-identified] : Metastatic carcinoma of Mullerian origin [de-identified] : Ms. Basurto is a 68yo w/ Stage BARBARA carcinoma of Mullerian origin currently undergoing neoadjuvant treatment with Carboplatin AUC 5 / Paclitaxel 175mg/m2, s/p C1 (8/24/24) who presents today for follow-up.  Of note, pt w/ recent hospitalization secondary to new found cardiomyopathy (HFrEF - EF25%), s/p LHC (8/23/24) NICM, started on Spironolactone, Losartan and Coreg and discharged with outpt Cardiology follow-up. While inpatient, pt received chemotherapy (Carbo AUC 5 / Taxol 175mg/m2) on 8/24/24 and also underwent paracentesis for 5L on 8/21.   Overall, pt reports feeling relatively well today. Plan for port insertion 9/11/24.  + Significant nausea/emesis after C1; occurred ~3-4d after chemo, lasted til about C7; Took some anti-emetics with some relief + Diarrhea, took Imodium with good relief + Endorses pain/tingling in right arm after chemo infusion on that arm + Mild numbness/tingling in hands  Pt has h/o ocular migraines? denies pain, just has floaters, feels this has been exacerbated since chemotherapy. Does not have ophthalmologist. Denies HA/vis changes. [FreeTextEntry3] : Nausea/emesis

## 2024-09-11 ENCOUNTER — APPOINTMENT (OUTPATIENT)
Dept: INTERVENTIONAL RADIOLOGY/VASCULAR | Facility: CLINIC | Age: 69
End: 2024-09-11

## 2024-09-11 VITALS
HEART RATE: 94 BPM | TEMPERATURE: 98 F | DIASTOLIC BLOOD PRESSURE: 66 MMHG | OXYGEN SATURATION: 95 % | SYSTOLIC BLOOD PRESSURE: 109 MMHG | RESPIRATION RATE: 16 BRPM

## 2024-09-11 NOTE — HISTORY OF PRESENT ILLNESS
[FreeTextEntry1] : PRE CALL PRIOR TO APPOINTMENT:     Phone Number: [phone]    RX on file:  yes    Referring MD:  wilfrido    Appointment date: 9/11    Currently on Chemotherapy:  no               CBC within 48 hours:  WBC:       ANC:     Diabetic: no    Clotting or Bleeding disorders:  no    PPM / Defibrillator:  no    NPO status advised:  yes    History of fall:   no     Assistant device for walking:  na     home: advised     Blood Thinners: no    Prescribing MD agree to have medication held:  n/a    Capacity to make decisions: yes    HCP:  no    DNR:  no    Person contact for Pre-Call:  Patient        Guidelines for Screening Anesthesia / Sedation Cases (TYPE YES OR NO)      Obtain Prescription / Authorization from Referring Physician: YES    Medical Necessity (Justification of the need for Anesthesia / Sedation) from referring Physician: YES     Have you taken oral sedation? (e.g. Xanax, Valium, and other oral sedatives):  NO     Clearance to receive Anesthesia / Sedation: YES- BY Dr. Wood          ANESTHESIA EXCLUSION CRITERIA QUESTIONNAIRE:    Difficult Airway: (TYPE YES OR NO) no    Previous Problem with Anesthesia or sedation: NO     History of Difficult IntubatioN: NO     Stridor, Snoring, Sleep Apnea: yes    Tonsils / Adenoids present: YES     Dysmorphic Facial Features: NO     Cervical Spine Fusion/ Cervical Spine Surgery: NO     Tumor in Airway: NO     Trauma to Airway: NO     Radiation therapy to head / neck: NO     Advanced Rheumatoid ArthritiS: NO     Active Cardiac Ischemia (as defined by EKG or Laboratory  Examination): NO     Severe COPD / Home O2: NO     Known or Suspected Increased Intracranial pressure: NO     Patient with BMI of 40 or greater : NO    Weight (kgs.)  115lb    Height (ft.)  5'3     BMI 20.1      Patients with Increased Risk of Aspiration: (TYPE YES OR NO)       Abnormal Airway: NO     Hiatal Hernia with Reflux: NO     Pregnancy: NO       Altered Mental State: NO        Spinal Cord Injury with Paraplegia or Quadriplegia: NO       History of delayed Gastric Emptying: NO        ASA Physical Status of 3 or greater (See Appendix 1 from policy ANSL.5502)         Malignant Hyperthermia Screening: (TYPE YES OR NO)     Family history unexpected death following anesthesia: NO     Family or personal history of Malignant Hyperthermia: NO        A muscle or neuromuscular disorder: NO          Jessica -Operative Assessment ( Day of Procedure)     Procedure: port insert     Indication: thrombocytosis     NPO status: 10 pm 9/10    Accompanied by:  granddaughter     Falls risk:  na    Labs: see chart     IVL: 22 LAC     IR MD: Dr. Kaiden Kwok     Urine Pregnancy: na    Pre-Op instructions: provided    POST-OP teaching initiated:  yes    Allergy bracelet on:no    Anesthesia plan discussed with IR MD:  yes    Antibiotic given:

## 2024-09-11 NOTE — ASSESSMENT
[FreeTextEntry1] : Pt presents for port placement. Newly diagnosed CHF with EF of 25%. Pt was willing to undergo the procedure with milder sedation, however could not tolerate even brief supine position due to dyspnea and related anxiety. Procedure postponed pending optimization. May require GA if she cannot be optimized for sedation.

## 2024-09-11 NOTE — HISTORY OF PRESENT ILLNESS
[FreeTextEntry1] : PRE CALL PRIOR TO APPOINTMENT:     Phone Number: [phone]    RX on file:  yes    Referring MD:  wilfrido    Appointment date: 9/11    Currently on Chemotherapy:  no               CBC within 48 hours:  WBC:       ANC:     Diabetic: no    Clotting or Bleeding disorders:  no    PPM / Defibrillator:  no    NPO status advised:  yes    History of fall:   no     Assistant device for walking:  na     home: advised     Blood Thinners: no    Prescribing MD agree to have medication held:  n/a    Capacity to make decisions: yes    HCP:  no    DNR:  no    Person contact for Pre-Call:  Patient        Guidelines for Screening Anesthesia / Sedation Cases (TYPE YES OR NO)      Obtain Prescription / Authorization from Referring Physician: YES    Medical Necessity (Justification of the need for Anesthesia / Sedation) from referring Physician: YES     Have you taken oral sedation? (e.g. Xanax, Valium, and other oral sedatives):  NO     Clearance to receive Anesthesia / Sedation: YES- BY Dr. Wood          ANESTHESIA EXCLUSION CRITERIA QUESTIONNAIRE:    Difficult Airway: (TYPE YES OR NO) no    Previous Problem with Anesthesia or sedation: NO     History of Difficult IntubatioN: NO     Stridor, Snoring, Sleep Apnea: yes    Tonsils / Adenoids present: YES     Dysmorphic Facial Features: NO     Cervical Spine Fusion/ Cervical Spine Surgery: NO     Tumor in Airway: NO     Trauma to Airway: NO     Radiation therapy to head / neck: NO     Advanced Rheumatoid ArthritiS: NO     Active Cardiac Ischemia (as defined by EKG or Laboratory  Examination): NO     Severe COPD / Home O2: NO     Known or Suspected Increased Intracranial pressure: NO     Patient with BMI of 40 or greater : NO    Weight (kgs.)  115lb    Height (ft.)  5'3     BMI 20.1      Patients with Increased Risk of Aspiration: (TYPE YES OR NO)       Abnormal Airway: NO     Hiatal Hernia with Reflux: NO     Pregnancy: NO       Altered Mental State: NO        Spinal Cord Injury with Paraplegia or Quadriplegia: NO       History of delayed Gastric Emptying: NO        ASA Physical Status of 3 or greater (See Appendix 1 from policy ANSL.5502)         Malignant Hyperthermia Screening: (TYPE YES OR NO)     Family history unexpected death following anesthesia: NO     Family or personal history of Malignant Hyperthermia: NO        A muscle or neuromuscular disorder: NO          Jessica -Operative Assessment ( Day of Procedure)     Procedure: port insert     Indication: thrombocytosis     NPO status: 10 pm 9/10    Accompanied by:  granddaughter     Falls risk:  na    Labs: see chart     IVL: 22 LAC     IR MD: Dr. Kaiden Kwok     Urine Pregnancy: na    Pre-Op instructions: provided    POST-OP teaching initiated:  yes    Allergy bracelet on:no    Anesthesia plan discussed with IR MD:  yes    Antibiotic given:

## 2024-09-12 ENCOUNTER — NON-APPOINTMENT (OUTPATIENT)
Age: 69
End: 2024-09-12

## 2024-09-12 ENCOUNTER — APPOINTMENT (OUTPATIENT)
Dept: CARDIOLOGY | Facility: CLINIC | Age: 69
End: 2024-09-12

## 2024-09-12 LAB
ALBUMIN SERPL ELPH-MCNC: 3.2 G/DL
ALP BLD-CCNC: 85 U/L
ALT SERPL-CCNC: 9 U/L
ANION GAP SERPL CALC-SCNC: 10 MMOL/L
AST SERPL-CCNC: 14 U/L
BILIRUB SERPL-MCNC: <0.2 MG/DL
BUN SERPL-MCNC: 8 MG/DL
CALCIUM SERPL-MCNC: 8.7 MG/DL
CANCER AG125 SERPL-ACNC: 923 U/ML
CHLORIDE SERPL-SCNC: 100 MMOL/L
CO2 SERPL-SCNC: 28 MMOL/L
CREAT SERPL-MCNC: 0.5 MG/DL
EGFR: 100 ML/MIN/1.73M2
GLUCOSE SERPL-MCNC: 108 MG/DL
MAGNESIUM SERPL-MCNC: 1.5 MG/DL
POTASSIUM SERPL-SCNC: 4.8 MMOL/L
PROT SERPL-MCNC: 5.9 G/DL
SODIUM SERPL-SCNC: 137 MMOL/L

## 2024-09-13 ENCOUNTER — APPOINTMENT (OUTPATIENT)
Dept: GASTROENTEROLOGY | Facility: CLINIC | Age: 69
End: 2024-09-13

## 2024-09-16 ENCOUNTER — APPOINTMENT (OUTPATIENT)
Age: 69
End: 2024-09-16

## 2024-09-17 DIAGNOSIS — Z51.11 ENCOUNTER FOR ANTINEOPLASTIC CHEMOTHERAPY: ICD-10-CM

## 2024-09-17 DIAGNOSIS — R11.2 NAUSEA WITH VOMITING, UNSPECIFIED: ICD-10-CM

## 2024-09-17 DIAGNOSIS — C80.1 MALIGNANT (PRIMARY) NEOPLASM, UNSPECIFIED: ICD-10-CM

## 2024-09-25 RX ORDER — SPIRONOLACTONE 25 MG/1
25 TABLET ORAL
Qty: 60 | Refills: 3 | Status: ACTIVE | COMMUNITY
Start: 2024-09-25 | End: 1900-01-01

## 2024-09-25 RX ORDER — LOSARTAN POTASSIUM 25 MG/1
25 TABLET, FILM COATED ORAL DAILY
Qty: 60 | Refills: 3 | Status: ACTIVE | COMMUNITY
Start: 2024-09-25 | End: 1900-01-01

## 2024-09-25 RX ORDER — CARVEDILOL 6.25 MG/1
6.25 TABLET, FILM COATED ORAL TWICE DAILY
Qty: 180 | Refills: 3 | Status: ACTIVE | COMMUNITY
Start: 2024-09-25 | End: 1900-01-01

## 2024-09-27 ENCOUNTER — OUTPATIENT (OUTPATIENT)
Dept: OUTPATIENT SERVICES | Facility: HOSPITAL | Age: 69
LOS: 1 days | End: 2024-09-27
Payer: COMMERCIAL

## 2024-09-27 VITALS
TEMPERATURE: 98 F | WEIGHT: 106.7 LBS | DIASTOLIC BLOOD PRESSURE: 60 MMHG | OXYGEN SATURATION: 98 % | RESPIRATION RATE: 20 BRPM | SYSTOLIC BLOOD PRESSURE: 100 MMHG | HEIGHT: 63 IN | HEART RATE: 66 BPM

## 2024-09-27 DIAGNOSIS — Z86.79 PERSONAL HISTORY OF OTHER DISEASES OF THE CIRCULATORY SYSTEM: ICD-10-CM

## 2024-09-27 DIAGNOSIS — C48.2 MALIGNANT NEOPLASM OF PERITONEUM, UNSPECIFIED: ICD-10-CM

## 2024-09-27 DIAGNOSIS — Z01.818 ENCOUNTER FOR OTHER PREPROCEDURAL EXAMINATION: ICD-10-CM

## 2024-09-27 DIAGNOSIS — D75.839 THROMBOCYTOSIS, UNSPECIFIED: ICD-10-CM

## 2024-09-27 DIAGNOSIS — F17.200 NICOTINE DEPENDENCE, UNSPECIFIED, UNCOMPLICATED: ICD-10-CM

## 2024-09-27 DIAGNOSIS — Z29.9 ENCOUNTER FOR PROPHYLACTIC MEASURES, UNSPECIFIED: ICD-10-CM

## 2024-09-27 DIAGNOSIS — Z98.890 OTHER SPECIFIED POSTPROCEDURAL STATES: Chronic | ICD-10-CM

## 2024-09-27 LAB
A1C WITH ESTIMATED AVERAGE GLUCOSE RESULT: 6.4 % — HIGH (ref 4–5.6)
ALBUMIN SERPL ELPH-MCNC: 3.5 G/DL — SIGNIFICANT CHANGE UP (ref 3.3–5.2)
ALP SERPL-CCNC: 89 U/L — SIGNIFICANT CHANGE UP (ref 40–120)
ALT FLD-CCNC: 10 U/L — SIGNIFICANT CHANGE UP
ANION GAP SERPL CALC-SCNC: 10 MMOL/L — SIGNIFICANT CHANGE UP (ref 5–17)
APTT BLD: 27.3 SEC — SIGNIFICANT CHANGE UP (ref 24.5–35.6)
AST SERPL-CCNC: 14 U/L — SIGNIFICANT CHANGE UP
BASOPHILS # BLD AUTO: 0.03 K/UL — SIGNIFICANT CHANGE UP (ref 0–0.2)
BASOPHILS NFR BLD AUTO: 0.9 % — SIGNIFICANT CHANGE UP (ref 0–2)
BILIRUB SERPL-MCNC: 0.3 MG/DL — LOW (ref 0.4–2)
BLD GP AB SCN SERPL QL: SIGNIFICANT CHANGE UP
BUN SERPL-MCNC: 13.4 MG/DL — SIGNIFICANT CHANGE UP (ref 8–20)
CALCIUM SERPL-MCNC: 9 MG/DL — SIGNIFICANT CHANGE UP (ref 8.4–10.5)
CHLORIDE SERPL-SCNC: 102 MMOL/L — SIGNIFICANT CHANGE UP (ref 96–108)
CO2 SERPL-SCNC: 28 MMOL/L — SIGNIFICANT CHANGE UP (ref 22–29)
CREAT SERPL-MCNC: 0.48 MG/DL — LOW (ref 0.5–1.3)
EGFR: 101 ML/MIN/1.73M2 — SIGNIFICANT CHANGE UP
EOSINOPHIL # BLD AUTO: 0.2 K/UL — SIGNIFICANT CHANGE UP (ref 0–0.5)
EOSINOPHIL NFR BLD AUTO: 6 % — SIGNIFICANT CHANGE UP (ref 0–6)
ESTIMATED AVERAGE GLUCOSE: 137 MG/DL — HIGH (ref 68–114)
GLUCOSE SERPL-MCNC: 113 MG/DL — HIGH (ref 70–99)
HCT VFR BLD CALC: 30.9 % — LOW (ref 34.5–45)
HGB BLD-MCNC: 9.9 G/DL — LOW (ref 11.5–15.5)
INR BLD: 0.99 RATIO — SIGNIFICANT CHANGE UP (ref 0.85–1.16)
LYMPHOCYTES # BLD AUTO: 1.62 K/UL — SIGNIFICANT CHANGE UP (ref 1–3.3)
LYMPHOCYTES # BLD AUTO: 49.1 % — HIGH (ref 13–44)
MCHC RBC-ENTMCNC: 27.9 PG — SIGNIFICANT CHANGE UP (ref 27–34)
MCHC RBC-ENTMCNC: 32 GM/DL — SIGNIFICANT CHANGE UP (ref 32–36)
MCV RBC AUTO: 87 FL — SIGNIFICANT CHANGE UP (ref 80–100)
MONOCYTES # BLD AUTO: 0.2 K/UL — SIGNIFICANT CHANGE UP (ref 0–0.9)
MONOCYTES NFR BLD AUTO: 6 % — SIGNIFICANT CHANGE UP (ref 2–14)
NEUTROPHILS # BLD AUTO: 1.08 K/UL — LOW (ref 1.8–7.4)
NEUTROPHILS NFR BLD AUTO: 32.8 % — LOW (ref 43–77)
PLAT MORPH BLD: NORMAL — SIGNIFICANT CHANGE UP
PLATELET # BLD AUTO: 195 K/UL — SIGNIFICANT CHANGE UP (ref 150–400)
POTASSIUM SERPL-MCNC: 4.6 MMOL/L — SIGNIFICANT CHANGE UP (ref 3.5–5.3)
POTASSIUM SERPL-SCNC: 4.6 MMOL/L — SIGNIFICANT CHANGE UP (ref 3.5–5.3)
PROT SERPL-MCNC: 6.7 G/DL — SIGNIFICANT CHANGE UP (ref 6.6–8.7)
PROTHROM AB SERPL-ACNC: 11.5 SEC — SIGNIFICANT CHANGE UP (ref 9.9–13.4)
RBC # BLD: 3.55 M/UL — LOW (ref 3.8–5.2)
RBC # FLD: 14.4 % — SIGNIFICANT CHANGE UP (ref 10.3–14.5)
RBC BLD AUTO: NORMAL — SIGNIFICANT CHANGE UP
SODIUM SERPL-SCNC: 139 MMOL/L — SIGNIFICANT CHANGE UP (ref 135–145)
T3 SERPL-MCNC: 122 NG/DL — SIGNIFICANT CHANGE UP (ref 80–200)
T4 AB SER-ACNC: 7.5 UG/DL — SIGNIFICANT CHANGE UP (ref 4.5–12)
TSH SERPL-MCNC: 1.35 UIU/ML — SIGNIFICANT CHANGE UP (ref 0.27–4.2)
VARIANT LYMPHS # BLD: 5.2 % — SIGNIFICANT CHANGE UP (ref 0–6)
WBC # BLD: 3.3 K/UL — LOW (ref 3.8–10.5)
WBC # FLD AUTO: 3.3 K/UL — LOW (ref 3.8–10.5)

## 2024-09-27 PROCEDURE — 71046 X-RAY EXAM CHEST 2 VIEWS: CPT | Mod: 26

## 2024-09-27 PROCEDURE — G0463: CPT

## 2024-09-27 PROCEDURE — 86900 BLOOD TYPING SEROLOGIC ABO: CPT

## 2024-09-27 PROCEDURE — 84480 ASSAY TRIIODOTHYRONINE (T3): CPT

## 2024-09-27 PROCEDURE — 71046 X-RAY EXAM CHEST 2 VIEWS: CPT

## 2024-09-27 PROCEDURE — 93010 ELECTROCARDIOGRAM REPORT: CPT

## 2024-09-27 PROCEDURE — 84436 ASSAY OF TOTAL THYROXINE: CPT

## 2024-09-27 PROCEDURE — 85730 THROMBOPLASTIN TIME PARTIAL: CPT

## 2024-09-27 PROCEDURE — 83036 HEMOGLOBIN GLYCOSYLATED A1C: CPT

## 2024-09-27 PROCEDURE — 86901 BLOOD TYPING SEROLOGIC RH(D): CPT

## 2024-09-27 PROCEDURE — 84443 ASSAY THYROID STIM HORMONE: CPT

## 2024-09-27 PROCEDURE — 86850 RBC ANTIBODY SCREEN: CPT

## 2024-09-27 PROCEDURE — 36415 COLL VENOUS BLD VENIPUNCTURE: CPT

## 2024-09-27 PROCEDURE — 80053 COMPREHEN METABOLIC PANEL: CPT

## 2024-09-27 PROCEDURE — 85610 PROTHROMBIN TIME: CPT

## 2024-09-27 PROCEDURE — 85025 COMPLETE CBC W/AUTO DIFF WBC: CPT

## 2024-09-27 PROCEDURE — 93005 ELECTROCARDIOGRAM TRACING: CPT

## 2024-09-27 NOTE — H&P PST ADULT - PROBLEM SELECTOR PLAN 3
Educated on use reduction. Reports 'trying to quit, down to 1/2 ppd"  CXR pending  PENDING MEDICAL CLEARANCE WITH PCP CARLOS [phone]       PENDING CARDIAC CLEARANCE WITH DR WHITTAKER [phone]

## 2024-09-27 NOTE — H&P PST ADULT - ASSESSMENT
70 y/o  F seen in PST   in anticipation of scheduled PORT INSERTION IN IR on 10/3/24  at [facility] [place] DR PADDY GUNN ORDERING/DR JOAQUIN DEL TORO DOING CASE.  Pt input and chart review appreciated. Newly diagnosed b/l adnexal masses and extensive peritoneal carcinomatosis found on CT imaging. Patient originally presented to [facility] ED on 24 for worsening abdominal distention and pain x 1 month with associated constipation. CT revealed Bilateral adnexal masses measuring 3.0 x 2.0 cm on the right and 5.7 x 1.0 cm on the left. Uterus is unremarkable. Moderate ascites. Extensive peritoneal carcinomatosis. Patient was discharged with GYN ONC follow up. Patient underwent omental biopsy on 24 path c/w metastatic carcinoma of Mullerian origin. S/p paracentesis , 2000cc drained. and referred for neoadjuvant chemotherapy. Of note, pt w/ recent hospitalization secondary to new found cardiomyopathy (HFrEF - EF25%), s/p LHC (24) NICM, started on Spironolactone, Losartan and Coreg and discharged with outpt Cardiology follow-up. While inpatient, pt received chemotherapy on 24 and also underwent paracentesis for 5L on 24. On 24 she was scheduled for port placement in IR but was unable to tolerate procedure.  IR note reviewed as follows: “Pt presents for port placement. Newly diagnosed CHF with EF of 25%. Pt was willing to undergo the procedure with milder sedation, however could not tolerate even brief supine position due to dyspnea and related anxiety. Procedure postponed pending optimization. May require general anesthesia if she cannot be optimized for sedation.”  Current every day smoker  PENDING MEDICAL CLEARANCE WITH MAUREEN GONZALEZ [phone]     PENDING CARDIAC CLEARANCE WITH DR WHITTAKER [phone]      OPIOID RISK TOOL    ANAID EACH BOX THAT APPLIES AND ADD TOTALS AT THE END    FAMILY HISTORY OF SUBSTANCE ABUSE                 FEMALE         MALE                                               Alcohol                             [  ]1 pt          [  ]3pts                                               Illegal Durgs                     [  ]2 pts        [  ]3pts                                               Rx Drugs                           [  ]4 pts        [  ]4 pts    PERSONAL HISTORY OF SUBSTANCE ABUSE                                                                                         Alcohol                             [  ]3 pts       [  ]3 pts                                               Illegal Durgs                     [  ]4 pts        [  ]4 pts                                               Rx Drugs                           [  ]5 pts        [  ]5 pts    AGE BETWEEN 16-45 YEARS                                      [  ]1 pt         [  ]1 pt    HISTORY OF PREADOLESCENT  SEXUAL ABUSE                                                             [  ]3 pts        [  ]0pts    PSYCHOLOGICAL DISEASE                     ADD, OCD, Bipolar, Schizophrenia        [  ]2 pts         [  ]2 pts                      Depression                                               [  ]1 pt           [  ]1 pt          Total:  0  A score of 3 or lower indicated LOW risk for future opiod abuse  A score of 4 to 7 indicated moderate risk for future opiod abuse  A score of 8 or higher indicates a high risk for opiod abuse .      CAPRINI SCORE    AGE RELATED RISK FACTORS                                                             [ ] Age 41-60 years                                            (1 Point)  [ X] Age: 61-74 years                                           (2 Points)                 [ ] Age= 75 years                                                (3 Points)             DISEASE RELATED RISK FACTORS                                                       [ ] Edema in the lower extremities                 (1 Point)                     [ ] Varicose veins                                               (1 Point)                                 [ ] BMI > 25 Kg/m2                                            (1 Point)                                  [ ] Serious infection (ie PNA)                            (1 Point)                     [ ] Lung disease ( COPD, Emphysema)            (1 Point)                                                                          [ ] Acute myocardial infarction                         (1 Point)                  [X ] Congestive heart failure (in the previous month)  (1 Point)         [ ] Inflammatory bowel disease                            (1 Point)                  [ ] Central venous access, PICC or Port               (2 points)       (within the last month)                                                                [ ] Stroke (in the previous month)                        (5 Points)    [X ] Previous or present malignancy                       (2 points)                                                                                                                                                         HEMATOLOGY RELATED FACTORS                                                         [ ] Prior episodes of VTE                                     (3 Points)                     [ ] Positive family history for VTE                      (3 Points)                  [ ] Prothrombin 23660 A                                     (3 Points)                     [ ] Factor V Leiden                                                (3 Points)                        [ ] Lupus anticoagulants                                      (3 Points)                                                           [ ] Anticardiolipin antibodies                              (3 Points)                                                       [ ] High homocysteine in the blood                   (3 Points)                                             [X ] Other congenital or acquired thrombophilia      (3 Points)                                                [ ] Heparin induced thrombocytopenia                  (3 Points)                                        MOBILITY RELATED FACTORS  [ ] Bed rest                                                         (1 Point)  [ ] Plaster cast                                                    (2 points)  [ ] Bed bound for more than 72 hours           (2 Points)    GENDER SPECIFIC FACTORS  [ ] Pregnancy or had a baby within the last month   (1 Point)  [ ] Post-partum < 6 weeks                                   (1 Point)  [ ] Hormonal therapy  or oral contraception   (1 Point)  [ ] History of pregnancy complications              (1 point)  [ ] Unexplained or recurrent              (1 Point)    OTHER RISK FACTORS                                           (1 Point)  [X ] BMI >40, smoking, diabetes requiring insulin, chemotherapy  blood transfusions and length of surgery over 2 hours    SURGERY RELATED RISK FACTORS  [ ]  Section within the last month     (1 Point)  [X ] Minor surgery                                                  (1 Point)  [ ] Arthroscopic surgery                                       (2 Points)  [ ] Planned major surgery lasting more            (2 Points)      than 45 minutes     [ ] Elective hip or knee joint replacement       (5 points)       surgery                                                TRAUMA RELATED RISK FACTORS  [ ] Fracture of the hip, pelvis, or leg                       (5 Points)  [ ] Spinal cord injury resulting in paralysis             (5 points)       (in the previous month)    [ ] Paralysis  (less than 1 month)                             (5 Points)  [ ] Multiple Trauma within 1 month                        (5 Points)    Total Score [  10   ]    Caprini Score 0-2: Low Risk, NO VTE prophylaxis required for most patients, encourage ambulation  Caprini Score 3-6: Moderate Risk , pharmacologic VTE prophylaxis is indicated for most patients (in the absence of contraindications)  Caprini Score Greater than or =7: High risk, pharmocologic VTE prophylaxis indicated for most patients (in the absence of contraindications)

## 2024-09-27 NOTE — H&P PST ADULT - REASON FOR ADMISSION
PORT INSERTION W/ANESTHESIA/DR PADDY GUNN ORDERING/DR JOAQUIN DEL TORO DOING CASE/PT GOING DIRECT TO RADIOLOGY/ANESTHESIA WILL BE INFORMED THE DAY BEFORE

## 2024-09-27 NOTE — H&P PST ADULT - NSICDXPASTMEDICALHX_GEN_ALL_CORE_FT
PAST MEDICAL HISTORY:  Abdominal mass     Abdominal pain     Adnexal mass     H/O CHF     Peritoneal carcinoma     Thrombocytosis     Vaginal delivery

## 2024-09-27 NOTE — H&P PST ADULT - PROBLEM SELECTOR PLAN 1
PST 9/27  in anticipation of scheduled PORT INSERTION IN IR on 10/3/24  at [facility] [place] PADDY GUNN ORDERING/DR JOAQUIN DEL TORO DOING CASE. Patient educated on no shaving x 3 days prior to procedure, correct use of surgical scrub, NPO after MN, preadmission instructions, day of procedure medications, MEDICAL/CARDIAC clearance needed.  Pt instructed to stop vitamins/supplements/herbal medications/ASA/NSAIDS for one week prior to surgery and discuss with PMD/PRESCIRBER/SPECIALIST any outstanding items or questions about prescriptions/medications. Written and oral instructions given to patient.     LABS DONE TODAY IN PST: CBC, A1C, CMP, T&S, PT INR THYROID, EKG, CXR  PENDING MEDICAL CLEARANCE WITH PCP CARLOS [phone]       PENDING CARDIAC CLEARANCE WITH DR WHITTAKER [phone]

## 2024-09-27 NOTE — H&P PST ADULT - HISTORY OF PRESENT ILLNESS
Pt presents for port placement. Newly diagnosed CHF with EF of 25%. Pt was willing to undergo the procedure with milder sedation, however could not tolerate even brief supine position due to dyspnea and related anxiety. Procedure postponed pending optimization. May require GA if she cannot be optimized for sedation.  70 y/o  F seen in PST 9/27  in anticipation of scheduled PORT INSERTION IN IR on 10/3/24  at [facility] [place] DR PADDY GUNN ORDERING/DR JOAQUIN DEL TORO DOING CASE.  Pt input and chart review appreciated. Newly diagnosed b/l adnexal masses and extensive peritoneal carcinomatosis found on CT imaging. Patient originally presented to [facility] ED on 7/26/24 for worsening abdominal distention and pain x 1 month with associated constipation. CT revealed Bilateral adnexal masses measuring 3.0 x 2.0 cm on the right and 5.7 x 1.0 cm on the left. Uterus is unremarkable. Moderate ascites. Extensive peritoneal carcinomatosis. Patient was discharged with GYN ONC follow up. 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 S/p paracentesis 8/9, 2000cc drained,  and referred for neoadjuvant chemotherapy. Of note, pt w/ recent hospitalization secondary to new found cardiomyopathy (HFrEF - EF25%), s/p LHC (8/23/24) NICM, started on Spironolactone, Losartan and Coreg and discharged with outpt Cardiology follow-up. While inpatient, pt received chemotherapy on 8/24/24 and also underwent paracentesis for 5L on 8/21/24. On 9/11/24 she was scheduled for port placement in IR but was unable to tolerate procedure.  IR note reviewed as follows: “Pt presents for port placement. Newly diagnosed CHF with EF of 25%. Pt was willing to undergo the procedure with milder sedation, however could not tolerate even brief supine position due to dyspnea and related anxiety. Procedure postponed pending optimization. May require general anesthesia if she cannot be optimized for sedation.”  Current every day smoker  PENDING MEDICAL CLEARANCE WITH PCP CARLOS [phone]      PENDING CARDIAC CLEARANCE WITH DR WHITTAKER [phone]

## 2024-09-27 NOTE — H&P PST ADULT - PROBLEM SELECTOR PLAN 2
Recent hospitalization secondary to new found cardiomyopathy (HFrEF - EF25%), s/p C (8/23/24) NICM, started on Spironolactone, Losartan and Coreg and discharged with outpt Cardiology follow-up. Continue  meds as ordered by prescriber.  PENDING MEDICAL CLEARANCE WITH PCP CARLOS [phone]       PENDING CARDIAC CLEARANCE WITH DR WHITTAKER [phone]

## 2024-09-27 NOTE — H&P PST ADULT - PATIENT OPTIMIZED PENDING
PENDING MEDICAL CLEARANCE WITH MAUREEN GONZALEZ [phone]     PENDING CARDIAC CLEARANCE WITH DR WHITTAKER [phone]

## 2024-09-27 NOTE — H&P PST ADULT - PROBLEM SELECTOR PLAN 4
Surgical team to address    Total Score [  10   ]   Caprini Score Greater than or =7: High risk, pharmocologic VTE prophylaxis indicated for most patients (in the absence of contraindications)

## 2024-09-30 ENCOUNTER — APPOINTMENT (OUTPATIENT)
Dept: INTERNAL MEDICINE | Facility: CLINIC | Age: 69
End: 2024-09-30
Payer: MEDICARE

## 2024-09-30 VITALS — WEIGHT: 106 LBS | BODY MASS INDEX: 18.78 KG/M2 | SYSTOLIC BLOOD PRESSURE: 110 MMHG | DIASTOLIC BLOOD PRESSURE: 65 MMHG

## 2024-09-30 DIAGNOSIS — J45.20 MILD INTERMITTENT ASTHMA, UNCOMPLICATED: ICD-10-CM

## 2024-09-30 DIAGNOSIS — Z86.79 PERSONAL HISTORY OF OTHER DISEASES OF THE CIRCULATORY SYSTEM: ICD-10-CM

## 2024-09-30 DIAGNOSIS — F17.210 NICOTINE DEPENDENCE, CIGARETTES, UNCOMPLICATED: ICD-10-CM

## 2024-09-30 DIAGNOSIS — Z01.818 ENCOUNTER FOR OTHER PREPROCEDURAL EXAMINATION: ICD-10-CM

## 2024-09-30 DIAGNOSIS — I10 ESSENTIAL (PRIMARY) HYPERTENSION: ICD-10-CM

## 2024-09-30 PROCEDURE — G2211 COMPLEX E/M VISIT ADD ON: CPT | Mod: GC

## 2024-09-30 PROCEDURE — 99214 OFFICE O/P EST MOD 30 MIN: CPT | Mod: GC

## 2024-09-30 RX ORDER — ALBUTEROL SULFATE 90 UG/1
108 (90 BASE) INHALANT RESPIRATORY (INHALATION)
Qty: 1 | Refills: 2 | Status: ACTIVE | COMMUNITY
Start: 2024-09-30 | End: 1900-01-01

## 2024-09-30 NOTE — PLAN
[FreeTextEntry1] : PT IS 68 YO F HERE FOR CLEARANCE FOR THE INSERION OF THE PORT, CHEMO INFUSION  Clearance - additional lab work collected  - TSH, APTT, PT, INR, A1C, Lipid function - needs cardiology clearance  Mild Intermittent Asthma - refiled albuterol inhaler  HTN -/65 -discussed and optimized blood pressure medication

## 2024-09-30 NOTE — HISTORY OF PRESENT ILLNESS
[Asthma] : asthma [Aortic Stenosis] : no aortic stenosis [Atrial Fibrillation] : no atrial fibrillation [Coronary Artery Disease] : no coronary artery disease [Recent Myocardial Infarction] : no recent myocardial infarction [Implantable Device/Pacemaker] : no implantable device/pacemaker [Family Member] : no family member with adverse anesthesia reaction/sudden death [Self] : no previous adverse anesthesia reaction [Chronic Anticoagulation] : no chronic anticoagulation [Chronic Kidney Disease] : no chronic kidney disease [Diabetes] : no diabetes [FreeTextEntry1] : CHEMO INFUSION PORT [FreeTextEntry2] : 10/03/2024 [FreeTextEntry7] : ECHO 8/2024 LVEF 20-25% CARDIC CATH DONE AT [facility] 8/2024 NORMAL  [FreeTextEntry4] : PT IS 68 YO F HERE FOR CLEARANCE FOR THE INSERION OF THE PORT, CHEMO INFUSION PMH OF PSORIASIS, ASTHMA, OVARIAN CANCER WITH PERITONEAL SEEDING, UNDERWENT 2 ROUNDS OF CHEMO CURRENT SMOKER, IN THE PROCESS OF QUITING, NOW ON 10 CIG. A DAY.  CARDIOLOGY FOLLOWING: PATIENT HAD A CARDIAC CATH DONE AT [facility] 8/2024, CARDIAC CATH WAS NORMAL, ECHO DONE AT THE SAME TIME SHOWED 20-25% LVEF.  NO PRIOR SURGICAL HISTORY CURRENTLY NO CHANGE IN PMH, WAS STARTED ON ALBUTEROL SULFATE INHALER, REPORTS SOB WHEN ANXIOUS.

## 2024-10-01 ENCOUNTER — NON-APPOINTMENT (OUTPATIENT)
Age: 69
End: 2024-10-01

## 2024-10-01 ENCOUNTER — APPOINTMENT (OUTPATIENT)
Dept: HEMATOLOGY ONCOLOGY | Facility: CLINIC | Age: 69
End: 2024-10-01

## 2024-10-01 PROBLEM — Z86.79 PERSONAL HISTORY OF OTHER DISEASES OF THE CIRCULATORY SYSTEM: Chronic | Status: ACTIVE | Noted: 2024-09-27

## 2024-10-01 LAB
APPEARANCE: CLEAR
APTT BLD: 31.2 SEC
BACTERIA: NEGATIVE /HPF
BILIRUBIN URINE: NEGATIVE
BLOOD URINE: NEGATIVE
CAST: 0 /LPF
CHOLEST SERPL-MCNC: 167 MG/DL
COLOR: YELLOW
EPITHELIAL CELLS: 0 /HPF
ESTIMATED AVERAGE GLUCOSE: 134 MG/DL
GLUCOSE QUALITATIVE U: NEGATIVE MG/DL
HBA1C MFR BLD HPLC: 6.3 %
HDLC SERPL-MCNC: 50 MG/DL
INR PPP: 0.92 RATIO
KETONES URINE: NEGATIVE MG/DL
LDLC SERPL CALC-MCNC: 97 MG/DL
LEUKOCYTE ESTERASE URINE: NEGATIVE
MICROSCOPIC-UA: NORMAL
NITRITE URINE: NEGATIVE
NONHDLC SERPL-MCNC: 117 MG/DL
PH URINE: 7
PROTEIN URINE: NORMAL MG/DL
PT BLD: 10.9 SEC
RED BLOOD CELLS URINE: 1 /HPF
SPECIFIC GRAVITY URINE: 1.01
TRIGL SERPL-MCNC: 112 MG/DL
TSH SERPL-ACNC: 1.49 UIU/ML
UROBILINOGEN URINE: 0.2 MG/DL
WHITE BLOOD CELLS URINE: 0 /HPF

## 2024-10-03 ENCOUNTER — TRANSCRIPTION ENCOUNTER (OUTPATIENT)
Age: 69
End: 2024-10-03

## 2024-10-07 ENCOUNTER — NON-APPOINTMENT (OUTPATIENT)
Age: 69
End: 2024-10-07

## 2024-10-07 ENCOUNTER — RESULT REVIEW (OUTPATIENT)
Age: 69
End: 2024-10-07

## 2024-10-07 ENCOUNTER — APPOINTMENT (OUTPATIENT)
Age: 69
End: 2024-10-07

## 2024-10-10 NOTE — ED PROVIDER NOTE - WR ORDER NAME 1
Detail Level: Detailed Size Of Lesion In Cm (Optional): 0.8 X Size Of Lesion In Cm (Optional): 0 Introduction Text (Please End With A Colon): The following procedure was deferred: ILK Xray Abdomen 1 View

## 2024-10-14 PROBLEM — D75.839 THROMBOCYTOSIS, UNSPECIFIED: Chronic | Status: ACTIVE | Noted: 2024-09-27

## 2024-10-15 ENCOUNTER — RESULT REVIEW (OUTPATIENT)
Age: 69
End: 2024-10-15

## 2024-10-15 ENCOUNTER — APPOINTMENT (OUTPATIENT)
Dept: GYNECOLOGIC ONCOLOGY | Facility: CLINIC | Age: 69
End: 2024-10-15
Payer: MEDICARE

## 2024-10-15 VITALS
OXYGEN SATURATION: 100 % | HEIGHT: 63 IN | SYSTOLIC BLOOD PRESSURE: 119 MMHG | HEART RATE: 92 BPM | WEIGHT: 106.15 LBS | BODY MASS INDEX: 18.81 KG/M2 | DIASTOLIC BLOOD PRESSURE: 76 MMHG

## 2024-10-15 DIAGNOSIS — C56.9 MALIGNANT NEOPLASM OF UNSPECIFIED OVARY: ICD-10-CM

## 2024-10-15 PROCEDURE — G2211 COMPLEX E/M VISIT ADD ON: CPT

## 2024-10-15 PROCEDURE — 99214 OFFICE O/P EST MOD 30 MIN: CPT

## 2024-10-15 RX ORDER — TRAMADOL HYDROCHLORIDE 50 MG/1
50 TABLET, COATED ORAL EVERY 8 HOURS
Qty: 45 | Refills: 0 | Status: ACTIVE | COMMUNITY
Start: 2024-10-15 | End: 1900-01-01

## 2024-10-17 LAB
ALBUMIN SERPL ELPH-MCNC: 4.1 G/DL
ALP BLD-CCNC: 83 U/L
ALT SERPL-CCNC: 12 U/L
ANION GAP SERPL CALC-SCNC: 11 MMOL/L
AST SERPL-CCNC: 15 U/L
BILIRUB SERPL-MCNC: 0.4 MG/DL
BUN SERPL-MCNC: 18 MG/DL
CALCIUM SERPL-MCNC: 9.6 MG/DL
CHLORIDE SERPL-SCNC: 96 MMOL/L
CO2 SERPL-SCNC: 28 MMOL/L
CREAT SERPL-MCNC: 0.67 MG/DL
EGFR: 93 ML/MIN/1.73M2
GLUCOSE SERPL-MCNC: 89 MG/DL
MAGNESIUM SERPL-MCNC: 1.4 MG/DL
POTASSIUM SERPL-SCNC: 5.2 MMOL/L
PROT SERPL-MCNC: 7 G/DL
SODIUM SERPL-SCNC: 135 MMOL/L

## 2024-10-18 ENCOUNTER — NON-APPOINTMENT (OUTPATIENT)
Age: 69
End: 2024-10-18

## 2024-10-18 RX ORDER — OMEGA-3/DHA/EPA/FISH OIL 300-1000MG
400 CAPSULE ORAL
Qty: 42 | Refills: 2 | Status: ACTIVE | COMMUNITY
Start: 2024-10-18 | End: 1900-01-01

## 2024-10-21 ENCOUNTER — OUTPATIENT (OUTPATIENT)
Dept: OUTPATIENT SERVICES | Facility: HOSPITAL | Age: 69
LOS: 1 days | Discharge: HOME | End: 2024-10-21

## 2024-10-21 DIAGNOSIS — Z98.890 OTHER SPECIFIED POSTPROCEDURAL STATES: Chronic | ICD-10-CM

## 2024-10-21 DIAGNOSIS — D64.9 ANEMIA, UNSPECIFIED: ICD-10-CM

## 2024-10-22 ENCOUNTER — APPOINTMENT (OUTPATIENT)
Dept: CARDIOLOGY | Facility: CLINIC | Age: 69
End: 2024-10-22
Payer: MEDICARE

## 2024-10-22 VITALS
DIASTOLIC BLOOD PRESSURE: 70 MMHG | HEIGHT: 63 IN | BODY MASS INDEX: 18.43 KG/M2 | WEIGHT: 104 LBS | HEART RATE: 115 BPM | OXYGEN SATURATION: 100 % | SYSTOLIC BLOOD PRESSURE: 139 MMHG

## 2024-10-22 DIAGNOSIS — F17.210 NICOTINE DEPENDENCE, CIGARETTES, UNCOMPLICATED: ICD-10-CM

## 2024-10-22 DIAGNOSIS — I50.20 UNSPECIFIED SYSTOLIC (CONGESTIVE) HEART FAILURE: ICD-10-CM

## 2024-10-22 DIAGNOSIS — R19.00 INTRA-ABDOMINAL AND PELVIC SWELLING, MASS AND LUMP, UNSPECIFIED SITE: ICD-10-CM

## 2024-10-22 DIAGNOSIS — I10 ESSENTIAL (PRIMARY) HYPERTENSION: ICD-10-CM

## 2024-10-22 PROCEDURE — 99214 OFFICE O/P EST MOD 30 MIN: CPT

## 2024-10-28 ENCOUNTER — RESULT REVIEW (OUTPATIENT)
Age: 69
End: 2024-10-28

## 2024-10-28 ENCOUNTER — APPOINTMENT (OUTPATIENT)
Age: 69
End: 2024-10-28

## 2024-10-29 DIAGNOSIS — Z51.11 ENCOUNTER FOR ANTINEOPLASTIC CHEMOTHERAPY: ICD-10-CM

## 2024-10-29 DIAGNOSIS — R11.2 NAUSEA WITH VOMITING, UNSPECIFIED: ICD-10-CM

## 2024-10-29 DIAGNOSIS — C56.9 MALIGNANT NEOPLASM OF UNSPECIFIED OVARY: ICD-10-CM

## 2024-11-01 ENCOUNTER — NON-APPOINTMENT (OUTPATIENT)
Age: 69
End: 2024-11-01

## 2024-11-11 ENCOUNTER — LABORATORY RESULT (OUTPATIENT)
Age: 69
End: 2024-11-11

## 2024-11-11 ENCOUNTER — APPOINTMENT (OUTPATIENT)
Dept: GYNECOLOGIC ONCOLOGY | Facility: CLINIC | Age: 69
End: 2024-11-11
Payer: MEDICARE

## 2024-11-11 VITALS
DIASTOLIC BLOOD PRESSURE: 78 MMHG | WEIGHT: 106 LBS | OXYGEN SATURATION: 100 % | BODY MASS INDEX: 18.78 KG/M2 | SYSTOLIC BLOOD PRESSURE: 112 MMHG | RESPIRATION RATE: 16 BRPM | HEART RATE: 93 BPM | HEIGHT: 63 IN

## 2024-11-11 DIAGNOSIS — C56.9 MALIGNANT NEOPLASM OF UNSPECIFIED OVARY: ICD-10-CM

## 2024-11-11 PROCEDURE — 99459 PELVIC EXAMINATION: CPT

## 2024-11-11 PROCEDURE — 99213 OFFICE O/P EST LOW 20 MIN: CPT

## 2024-11-11 PROCEDURE — G2211 COMPLEX E/M VISIT ADD ON: CPT

## 2024-11-12 ENCOUNTER — RESULT REVIEW (OUTPATIENT)
Age: 69
End: 2024-11-12

## 2024-11-12 ENCOUNTER — APPOINTMENT (OUTPATIENT)
Dept: HEMATOLOGY ONCOLOGY | Facility: CLINIC | Age: 69
End: 2024-11-12

## 2024-11-12 ENCOUNTER — APPOINTMENT (OUTPATIENT)
Dept: GYNECOLOGIC ONCOLOGY | Facility: CLINIC | Age: 69
End: 2024-11-12
Payer: MEDICARE

## 2024-11-12 VITALS
DIASTOLIC BLOOD PRESSURE: 72 MMHG | HEART RATE: 87 BPM | BODY MASS INDEX: 19.24 KG/M2 | TEMPERATURE: 96 F | WEIGHT: 108.58 LBS | SYSTOLIC BLOOD PRESSURE: 116 MMHG | HEIGHT: 63 IN | OXYGEN SATURATION: 99 %

## 2024-11-12 PROCEDURE — 99214 OFFICE O/P EST MOD 30 MIN: CPT

## 2024-11-12 PROCEDURE — G2211 COMPLEX E/M VISIT ADD ON: CPT

## 2024-11-13 ENCOUNTER — NON-APPOINTMENT (OUTPATIENT)
Age: 69
End: 2024-11-13

## 2024-11-13 LAB
ALBUMIN SERPL ELPH-MCNC: 3.9 G/DL
ALP BLD-CCNC: 84 U/L
ALT SERPL-CCNC: 10 U/L
ANION GAP SERPL CALC-SCNC: 11 MMOL/L
AST SERPL-CCNC: 14 U/L
BILIRUB SERPL-MCNC: 0.3 MG/DL
BUN SERPL-MCNC: 11 MG/DL
CALCIUM SERPL-MCNC: 9.1 MG/DL
CANCER AG125 SERPL-ACNC: 64 U/ML
CHLORIDE SERPL-SCNC: 99 MMOL/L
CO2 SERPL-SCNC: 25 MMOL/L
CREAT SERPL-MCNC: 0.64 MG/DL
EGFR: 94 ML/MIN/1.73M2
GLUCOSE SERPL-MCNC: 103 MG/DL
MAGNESIUM SERPL-MCNC: 1.6 MG/DL
POTASSIUM SERPL-SCNC: 4.6 MMOL/L
PROT SERPL-MCNC: 6.7 G/DL
SODIUM SERPL-SCNC: 135 MMOL/L

## 2024-11-18 ENCOUNTER — RESULT REVIEW (OUTPATIENT)
Age: 69
End: 2024-11-18

## 2024-11-18 ENCOUNTER — APPOINTMENT (OUTPATIENT)
Age: 69
End: 2024-11-18

## 2024-11-21 ENCOUNTER — TRANSCRIPTION ENCOUNTER (OUTPATIENT)
Age: 69
End: 2024-11-21

## 2024-11-22 ENCOUNTER — TRANSCRIPTION ENCOUNTER (OUTPATIENT)
Age: 69
End: 2024-11-22

## 2024-11-29 ENCOUNTER — INPATIENT (INPATIENT)
Facility: HOSPITAL | Age: 69
LOS: 2 days | Discharge: HOME | DRG: 808 | End: 2024-12-02
Attending: INTERNAL MEDICINE | Admitting: STUDENT IN AN ORGANIZED HEALTH CARE EDUCATION/TRAINING PROGRAM
Payer: COMMERCIAL

## 2024-11-29 VITALS
OXYGEN SATURATION: 99 % | HEIGHT: 63 IN | SYSTOLIC BLOOD PRESSURE: 144 MMHG | RESPIRATION RATE: 18 BRPM | TEMPERATURE: 98 F | HEART RATE: 76 BPM | DIASTOLIC BLOOD PRESSURE: 98 MMHG

## 2024-11-29 DIAGNOSIS — Z98.890 OTHER SPECIFIED POSTPROCEDURAL STATES: Chronic | ICD-10-CM

## 2024-11-29 DIAGNOSIS — D61.810 ANTINEOPLASTIC CHEMOTHERAPY INDUCED PANCYTOPENIA: ICD-10-CM

## 2024-11-29 LAB
ALBUMIN SERPL ELPH-MCNC: 3.3 G/DL — SIGNIFICANT CHANGE UP (ref 3.3–5.2)
ALP SERPL-CCNC: 78 U/L — SIGNIFICANT CHANGE UP (ref 40–120)
ALT FLD-CCNC: 11 U/L — SIGNIFICANT CHANGE UP
ANION GAP SERPL CALC-SCNC: 10 MMOL/L — SIGNIFICANT CHANGE UP (ref 5–17)
ANISOCYTOSIS BLD QL: SLIGHT — SIGNIFICANT CHANGE UP
APPEARANCE UR: CLEAR — SIGNIFICANT CHANGE UP
APTT BLD: 26.5 SEC — SIGNIFICANT CHANGE UP (ref 24.5–35.6)
AST SERPL-CCNC: 14 U/L — SIGNIFICANT CHANGE UP
BACTERIA # UR AUTO: NEGATIVE /HPF — SIGNIFICANT CHANGE UP
BASOPHILS # BLD AUTO: 0 K/UL — SIGNIFICANT CHANGE UP (ref 0–0.2)
BASOPHILS NFR BLD AUTO: 0 % — SIGNIFICANT CHANGE UP (ref 0–2)
BILIRUB SERPL-MCNC: 0.2 MG/DL — LOW (ref 0.4–2)
BILIRUB UR-MCNC: NEGATIVE — SIGNIFICANT CHANGE UP
BLD GP AB SCN SERPL QL: SIGNIFICANT CHANGE UP
BUN SERPL-MCNC: 13.5 MG/DL — SIGNIFICANT CHANGE UP (ref 8–20)
CALCIUM SERPL-MCNC: 8.6 MG/DL — SIGNIFICANT CHANGE UP (ref 8.4–10.5)
CAST: 0 /LPF — SIGNIFICANT CHANGE UP (ref 0–4)
CHLORIDE SERPL-SCNC: 96 MMOL/L — SIGNIFICANT CHANGE UP (ref 96–108)
CO2 SERPL-SCNC: 27 MMOL/L — SIGNIFICANT CHANGE UP (ref 22–29)
COLOR SPEC: ABNORMAL
CREAT SERPL-MCNC: 0.63 MG/DL — SIGNIFICANT CHANGE UP (ref 0.5–1.3)
DIFF PNL FLD: NEGATIVE — SIGNIFICANT CHANGE UP
EGFR: 95 ML/MIN/1.73M2 — SIGNIFICANT CHANGE UP
EOSINOPHIL # BLD AUTO: 0 K/UL — SIGNIFICANT CHANGE UP (ref 0–0.5)
EOSINOPHIL NFR BLD AUTO: 0 % — SIGNIFICANT CHANGE UP (ref 0–6)
FLUAV AG NPH QL: SIGNIFICANT CHANGE UP
FLUBV AG NPH QL: SIGNIFICANT CHANGE UP
GAS PNL BLDV: SIGNIFICANT CHANGE UP
GIANT PLATELETS BLD QL SMEAR: PRESENT — SIGNIFICANT CHANGE UP
GLUCOSE SERPL-MCNC: 98 MG/DL — SIGNIFICANT CHANGE UP (ref 70–99)
GLUCOSE UR QL: NEGATIVE MG/DL — SIGNIFICANT CHANGE UP
HCT VFR BLD CALC: 18.3 % — CRITICAL LOW (ref 34.5–45)
HCT VFR BLD CALC: 31.2 % — LOW (ref 34.5–45)
HGB BLD-MCNC: 10.9 G/DL — LOW (ref 11.5–15.5)
HGB BLD-MCNC: 6.3 G/DL — CRITICAL LOW (ref 11.5–15.5)
INR BLD: 1.03 RATIO — SIGNIFICANT CHANGE UP (ref 0.85–1.16)
KETONES UR-MCNC: NEGATIVE MG/DL — SIGNIFICANT CHANGE UP
LEUKOCYTE ESTERASE UR-ACNC: NEGATIVE — SIGNIFICANT CHANGE UP
LYMPHOCYTES # BLD AUTO: 1.16 K/UL — SIGNIFICANT CHANGE UP (ref 1–3.3)
LYMPHOCYTES # BLD AUTO: 59.5 % — HIGH (ref 13–44)
MAGNESIUM SERPL-MCNC: 1.6 MG/DL — SIGNIFICANT CHANGE UP (ref 1.6–2.6)
MANUAL SMEAR VERIFICATION: SIGNIFICANT CHANGE UP
MCHC RBC-ENTMCNC: 31.5 PG — SIGNIFICANT CHANGE UP (ref 27–34)
MCHC RBC-ENTMCNC: 33.7 PG — SIGNIFICANT CHANGE UP (ref 27–34)
MCHC RBC-ENTMCNC: 34.4 G/DL — SIGNIFICANT CHANGE UP (ref 32–36)
MCHC RBC-ENTMCNC: 34.9 G/DL — SIGNIFICANT CHANGE UP (ref 32–36)
MCV RBC AUTO: 90.2 FL — SIGNIFICANT CHANGE UP (ref 80–100)
MCV RBC AUTO: 97.9 FL — SIGNIFICANT CHANGE UP (ref 80–100)
MONOCYTES # BLD AUTO: 0.35 K/UL — SIGNIFICANT CHANGE UP (ref 0–0.9)
MONOCYTES NFR BLD AUTO: 18 % — HIGH (ref 2–14)
MRSA PCR RESULT.: SIGNIFICANT CHANGE UP
NEUTROPHILS # BLD AUTO: 0.33 K/UL — LOW (ref 1.8–7.4)
NEUTROPHILS NFR BLD AUTO: 17.1 % — LOW (ref 43–77)
NITRITE UR-MCNC: NEGATIVE — SIGNIFICANT CHANGE UP
NT-PROBNP SERPL-SCNC: 1328 PG/ML — HIGH (ref 0–300)
OVALOCYTES BLD QL SMEAR: SIGNIFICANT CHANGE UP
PH UR: 6 — SIGNIFICANT CHANGE UP (ref 5–8)
PHOSPHATE SERPL-MCNC: 3.2 MG/DL — SIGNIFICANT CHANGE UP (ref 2.4–4.7)
PLAT MORPH BLD: NORMAL — SIGNIFICANT CHANGE UP
PLATELET # BLD AUTO: 32 K/UL — LOW (ref 150–400)
PLATELET # BLD AUTO: 33 K/UL — LOW (ref 150–400)
POLYCHROMASIA BLD QL SMEAR: SIGNIFICANT CHANGE UP
POTASSIUM SERPL-MCNC: 4.6 MMOL/L — SIGNIFICANT CHANGE UP (ref 3.5–5.3)
POTASSIUM SERPL-SCNC: 4.6 MMOL/L — SIGNIFICANT CHANGE UP (ref 3.5–5.3)
PROT SERPL-MCNC: 5.8 G/DL — LOW (ref 6.6–8.7)
PROT UR-MCNC: SIGNIFICANT CHANGE UP MG/DL
PROTHROM AB SERPL-ACNC: 12 SEC — SIGNIFICANT CHANGE UP (ref 9.9–13.4)
RBC # BLD: 1.87 M/UL — LOW (ref 3.8–5.2)
RBC # BLD: 3.46 M/UL — LOW (ref 3.8–5.2)
RBC # FLD: 22.4 % — HIGH (ref 10.3–14.5)
RBC # FLD: 22.5 % — HIGH (ref 10.3–14.5)
RBC BLD AUTO: ABNORMAL
RBC CASTS # UR COMP ASSIST: 1 /HPF — SIGNIFICANT CHANGE UP (ref 0–4)
RSV RNA NPH QL NAA+NON-PROBE: SIGNIFICANT CHANGE UP
S AUREUS DNA NOSE QL NAA+PROBE: SIGNIFICANT CHANGE UP
SARS-COV-2 RNA SPEC QL NAA+PROBE: SIGNIFICANT CHANGE UP
SMUDGE CELLS # BLD: PRESENT — SIGNIFICANT CHANGE UP
SODIUM SERPL-SCNC: 132 MMOL/L — LOW (ref 135–145)
SP GR SPEC: >1.03 — HIGH (ref 1–1.03)
SPHEROCYTES BLD QL SMEAR: SLIGHT — SIGNIFICANT CHANGE UP
SQUAMOUS # UR AUTO: 0 /HPF — SIGNIFICANT CHANGE UP (ref 0–5)
TROPONIN T, HIGH SENSITIVITY RESULT: 46 NG/L — SIGNIFICANT CHANGE UP (ref 0–51)
UROBILINOGEN FLD QL: 0.2 MG/DL — SIGNIFICANT CHANGE UP (ref 0.2–1)
VARIANT LYMPHS # BLD: 5.4 % — SIGNIFICANT CHANGE UP (ref 0–6)
WBC # BLD: 1.72 K/UL — LOW (ref 3.8–10.5)
WBC # BLD: 1.95 K/UL — LOW (ref 3.8–10.5)
WBC # FLD AUTO: 1.72 K/UL — LOW (ref 3.8–10.5)
WBC # FLD AUTO: 1.95 K/UL — LOW (ref 3.8–10.5)
WBC UR QL: 0 /HPF — SIGNIFICANT CHANGE UP (ref 0–5)

## 2024-11-29 PROCEDURE — 99222 1ST HOSP IP/OBS MODERATE 55: CPT

## 2024-11-29 PROCEDURE — 93010 ELECTROCARDIOGRAM REPORT: CPT

## 2024-11-29 PROCEDURE — 71045 X-RAY EXAM CHEST 1 VIEW: CPT | Mod: 26

## 2024-11-29 PROCEDURE — 99223 1ST HOSP IP/OBS HIGH 75: CPT

## 2024-11-29 PROCEDURE — 99285 EMERGENCY DEPT VISIT HI MDM: CPT

## 2024-11-29 PROCEDURE — 71275 CT ANGIOGRAPHY CHEST: CPT | Mod: 26,MC

## 2024-11-29 RX ORDER — GUAIFENESIN 400 MG
200 TABLET ORAL EVERY 6 HOURS
Refills: 0 | Status: DISCONTINUED | OUTPATIENT
Start: 2024-11-29 | End: 2024-12-02

## 2024-11-29 RX ORDER — ACETAMINOPHEN, DIPHENHYDRAMINE HCL, PHENYLEPHRINE HCL 325; 25; 5 MG/1; MG/1; MG/1
3 TABLET ORAL AT BEDTIME
Refills: 0 | Status: DISCONTINUED | OUTPATIENT
Start: 2024-11-29 | End: 2024-12-02

## 2024-11-29 RX ORDER — CEFEPIME 2 G/1
2000 INJECTION, POWDER, FOR SOLUTION INTRAVENOUS EVERY 8 HOURS
Refills: 0 | Status: DISCONTINUED | OUTPATIENT
Start: 2024-11-29 | End: 2024-12-02

## 2024-11-29 RX ORDER — CEFEPIME 2 G/1
INJECTION, POWDER, FOR SOLUTION INTRAVENOUS
Refills: 0 | Status: DISCONTINUED | OUTPATIENT
Start: 2024-11-29 | End: 2024-11-29

## 2024-11-29 RX ORDER — LOSARTAN POTASSIUM 100 MG/1
12.5 TABLET, FILM COATED ORAL DAILY
Refills: 0 | Status: DISCONTINUED | OUTPATIENT
Start: 2024-11-29 | End: 2024-12-02

## 2024-11-29 RX ORDER — CEFEPIME 2 G/1
INJECTION, POWDER, FOR SOLUTION INTRAVENOUS
Refills: 0 | Status: DISCONTINUED | OUTPATIENT
Start: 2024-11-29 | End: 2024-12-02

## 2024-11-29 RX ORDER — CARVEDILOL 25 MG/1
6.25 TABLET, FILM COATED ORAL EVERY 12 HOURS
Refills: 0 | Status: DISCONTINUED | OUTPATIENT
Start: 2024-11-29 | End: 2024-12-02

## 2024-11-29 RX ORDER — IPRATROPIUM BROMIDE AND ALBUTEROL SULFATE 2.5; .5 MG/3ML; MG/3ML
3 SOLUTION RESPIRATORY (INHALATION) ONCE
Refills: 0 | Status: COMPLETED | OUTPATIENT
Start: 2024-11-29 | End: 2024-11-29

## 2024-11-29 RX ORDER — IPRATROPIUM BROMIDE AND ALBUTEROL SULFATE 2.5; .5 MG/3ML; MG/3ML
3 SOLUTION RESPIRATORY (INHALATION) EVERY 6 HOURS
Refills: 0 | Status: DISCONTINUED | OUTPATIENT
Start: 2024-11-29 | End: 2024-11-30

## 2024-11-29 RX ORDER — ACETAMINOPHEN 500MG 500 MG/1
650 TABLET, COATED ORAL EVERY 6 HOURS
Refills: 0 | Status: DISCONTINUED | OUTPATIENT
Start: 2024-11-29 | End: 2024-12-02

## 2024-11-29 RX ORDER — MAGNESIUM, ALUMINUM HYDROXIDE 200-225/5
30 SUSPENSION, ORAL (FINAL DOSE FORM) ORAL EVERY 4 HOURS
Refills: 0 | Status: DISCONTINUED | OUTPATIENT
Start: 2024-11-29 | End: 2024-12-02

## 2024-11-29 RX ORDER — CEFEPIME 2 G/1
2000 INJECTION, POWDER, FOR SOLUTION INTRAVENOUS ONCE
Refills: 0 | Status: COMPLETED | OUTPATIENT
Start: 2024-11-29 | End: 2024-11-29

## 2024-11-29 RX ORDER — NICOTINE 21 MG/24H
1 PATCH, EXTENDED RELEASE TRANSDERMAL DAILY
Refills: 0 | Status: DISCONTINUED | OUTPATIENT
Start: 2024-11-29 | End: 2024-12-02

## 2024-11-29 RX ORDER — SPIRONOLACTONE 25 MG
12.5 TABLET ORAL DAILY
Refills: 0 | Status: DISCONTINUED | OUTPATIENT
Start: 2024-11-29 | End: 2024-12-02

## 2024-11-29 RX ORDER — TRAMADOL HYDROCHLORIDE 300 MG/1
50 CAPSULE ORAL EVERY 6 HOURS
Refills: 0 | Status: DISCONTINUED | OUTPATIENT
Start: 2024-11-29 | End: 2024-12-02

## 2024-11-29 RX ORDER — ONDANSETRON HYDROCHLORIDE 4 MG/1
4 TABLET, FILM COATED ORAL EVERY 8 HOURS
Refills: 0 | Status: DISCONTINUED | OUTPATIENT
Start: 2024-11-29 | End: 2024-12-02

## 2024-11-29 RX ADMIN — CEFEPIME 2000 MILLIGRAM(S): 2 INJECTION, POWDER, FOR SOLUTION INTRAVENOUS at 21:05

## 2024-11-29 RX ADMIN — NICOTINE 1 PATCH: 21 PATCH, EXTENDED RELEASE TRANSDERMAL at 05:21

## 2024-11-29 RX ADMIN — TRAMADOL HYDROCHLORIDE 50 MILLIGRAM(S): 300 CAPSULE ORAL at 22:04

## 2024-11-29 RX ADMIN — IPRATROPIUM BROMIDE AND ALBUTEROL SULFATE 3 MILLILITER(S): 2.5; .5 SOLUTION RESPIRATORY (INHALATION) at 12:56

## 2024-11-29 RX ADMIN — Medication 12.5 MILLIGRAM(S): at 09:50

## 2024-11-29 RX ADMIN — Medication 200 MILLIGRAM(S): at 12:52

## 2024-11-29 RX ADMIN — CARVEDILOL 6.25 MILLIGRAM(S): 25 TABLET, FILM COATED ORAL at 17:59

## 2024-11-29 RX ADMIN — TRAMADOL HYDROCHLORIDE 50 MILLIGRAM(S): 300 CAPSULE ORAL at 23:00

## 2024-11-29 RX ADMIN — CEFEPIME 2000 MILLIGRAM(S): 2 INJECTION, POWDER, FOR SOLUTION INTRAVENOUS at 14:17

## 2024-11-29 RX ADMIN — NICOTINE 1 PATCH: 21 PATCH, EXTENDED RELEASE TRANSDERMAL at 19:30

## 2024-11-29 RX ADMIN — CEFEPIME 2000 MILLIGRAM(S): 2 INJECTION, POWDER, FOR SOLUTION INTRAVENOUS at 06:21

## 2024-11-29 RX ADMIN — IPRATROPIUM BROMIDE AND ALBUTEROL SULFATE 3 MILLILITER(S): 2.5; .5 SOLUTION RESPIRATORY (INHALATION) at 03:13

## 2024-11-29 RX ADMIN — LOSARTAN POTASSIUM 12.5 MILLIGRAM(S): 100 TABLET, FILM COATED ORAL at 09:50

## 2024-11-29 NOTE — ED PROVIDER NOTE - PROGRESS NOTE DETAILS
Panda: No obvious consolidation on CXR. Noted to be pancytopenic on labs, may be secondary to her chemo treatments, including Hb of 6.3. Will send type and screen and transfuse, pending CT. Likely admission.

## 2024-11-29 NOTE — PATIENT PROFILE ADULT - NS PRO AD NO ADVANCE DIRECTIVE
No
My signature below certifies that the above stated patient is homebound and upon completion of the Face-To-Face encounter, has the need for intermittent skilled nursing, physical therapy and/or speech or occupational therapy services in their home for their current diagnosis as outlined in their initial plan of care. These services will continue to be monitored by myself or another physician.

## 2024-11-29 NOTE — ED ADULT TRIAGE NOTE - CHIEF COMPLAINT QUOTE
pt BIBEMS c/o SOB/ x2 days, pt endorses cold/flu like symptoms and cough for a couple days, pt did not take anything at home, hx of ovarian CA and stomach CA currently receiving chemo and CHF, pt received one duoneb with ems, pt endorses improvement

## 2024-11-29 NOTE — H&P ADULT - NSHPLABSRESULTS_GEN_ALL_CORE
6.3    1.72  )-----------( 32       ( 29 Nov 2024 03:15 )             18.3         11-29    132[L]  |  96  |  13.5  ----------------------------<  98  4.6   |  27.0  |  0.63    Ca    8.6      29 Nov 2024 03:15    TPro  5.8[L]  /  Alb  3.3  /  TBili  0.2[L]  /  DBili  x   /  AST  14  /  ALT  11  /  AlkPhos  78  11-29        < from: CT Angio Chest PE Protocol w/ IV Cont (11.29.24 @ 04:54) >    IMPRESSION:  No pulmonary embolism.    Bilateral lower lobe mucus plugging and ground glass opacity with   tree-in-bud nodularity in the left lower lobe, likely reflecting   aspiration pneumonia/pneumonitis.    < end of copied text >      < from: MARYAM W or WO Ultrasound Enhancing Agent (08.23.24 @ 08:47) >    CONCLUSIONS:      1. Left ventricular systolic function is severely decreased with an ejection fraction visually estimated at 25 to 30 %.   2. Normal right ventricular cavity size and normal right ventricular systolic function.   3. The mitral valve appears thickened, with moderate mitral regurgitaiton. There is a small mobile echodensity attached to the papillary muscle which represents a calcified ruptured chordae.   4. Trileaflet aortic valve with normal systolic excursion, with trace aortic regurgitation.   5. No evidence of a patent foramen ovale by color flow Doppler.   6. No evidence of left atrial or left atrial appendage thrombus. The left atrial appendage emptying velocity is normal at 60 cm/s.   7. Mild aortic calcification seen in the aortic root.   8. Mild non-mobile focal atheroma which measures up to 2.30 mm in the visualized portions of the descending aorta.   9. Small pericardial effusion noted adjacent to the right atrium, small pericardial effusion in the transverse sinus adjacent to the BLAKE and small to moderate sized pericardial effusion noted adjacent to the posterolateral left ventricle with no evidence of hemodynamic compromise (or echocardiographic evidence of cardiac tamponade).    < end of copied text >

## 2024-11-29 NOTE — H&P ADULT - NSHPPHYSICALEXAM_GEN_ALL_CORE
GENERAL: pt examined bedside, laying comfortably in bed in NAD  HEENT: NC/AT, moist oral mucosa, clear conjunctiva, sclera nonicteric  RESPIRATORY: Normal respiratory effort; CTA b/l, no wheezing, rhonchi, rales  CARDIOVASCULAR: RRR, normal S1 and S2, no murmur/rub/gallop  ABDOMEN: soft, NT/ND, normoactive bowel sounds, no rebound/guarding  MSK: No joint deformities, edema, erythema  EXTREMITIES: No cynaosis, no clubbing, no lower extremity edema; Peripheral pulses are 2+ bilaterally  PSYCH: affect appropriate and cooperative  NEUROLOGY: A+O to person, place, and time, no focal neurologic deficits appreciated  SKIN: No rashes or no palpable lesions GENERAL: pt examined bedside, in NAD  HEENT: NC/AT, dry oral mucosa, pale conjunctiva, sclera nonicteric  RESPIRATORY: Normal respiratory effort, no wheezing, rhonchi, rales  CARDIOVASCULAR: RRR, normal S1 and S2  ABDOMEN: soft, NT/ND, +bowel sounds, no rebound/guarding  MSK: No joint deformities, edema, erythema  EXTREMITIES: No cynaosis, no clubbing, no lower extremity edema  PSYCH: affect appropriate and cooperative  NEUROLOGY: A+O to person, place, and time, no focal neurologic deficits appreciated  SKIN: pallor and poor turgor

## 2024-11-29 NOTE — CONSULT NOTE ADULT - ASSESSMENT
INCOMPLETE    70 y/o F PMH of Stage BARBARA ovarian CA w/ carcinomatosis (currently on Carboplatin AUC 5 / Paclitaxel 175mg/m2 s/p C5 on 11/18/24; follows w/ chintan), HFrEF (EF 25%), active tobacco use presented to ED c/o sob w/ associated nonproductive cough and congestion x 2-3 days. Found to be pancytopenic, hematology consulted    Pancytopenia: Likely multifactorial primarily driven by recent chemotherapy and possible underlying infection.  Agree with current supportive management with empiric cefepime, f/u urine/blood and tailer accordingly. Transfuse for Hb<8 or plts <20K in setting of sepsis or <50K if bleeding.  68 y/o F PMH of Stage BARBARA ovarian CA w/ carcinomatosis (currently on Carboplatin AUC 5 / Paclitaxel 175mg/m2 s/p C5 on 11/18/24; follows w/ chintan), HFrEF (EF 25%), active tobacco use presented to ED c/o sob w/ associated nonproductive cough and congestion x 2-3 days. Found to be pancytopenic, hematology consulted    Pancytopenia: Likely multifactorial primarily driven by recent chemotherapy and possible underlying infection.  Agree with current supportive management with empiric cefepime, f/u urine/blood and tailer accordingly. Transfuse for Hb<8 or plts <20K in setting of sepsis or <50K if bleeding.

## 2024-11-29 NOTE — ED ADULT NURSE REASSESSMENT NOTE - NS ED NURSE REASSESS COMMENT FT1
Patient is A&Ox4. Again, S/S of blood transfusion reaction verbalized to patient. Pt states that they have no symptoms at this time. VS are stable. Again, patient was notified to inform RN or any staff member if S/S should present. Patient verbalized understanding of blood transfusion reaction.

## 2024-11-29 NOTE — ED PROVIDER NOTE - PHYSICAL EXAMINATION
General: NAD  Head: NC, AT  EENT: EOMI, no scleral icterus  Cardiac: RRR, no apparent murmurs, no lower extremity edema  Respiratory: CTABL, no respiratory distress   Abdomen: soft, ND, NT, nonperitonitic  MSK/Vascular: full ROM, soft compartments, warm extremities, 2+ peripheral pulses b/l, no posterior calf TTP  Neuro: AAOx3, sensation to light touch intact  Psych: calm, cooperative

## 2024-11-29 NOTE — CONSULT NOTE ADULT - SUBJECTIVE AND OBJECTIVE BOX
REASON FOR CONSULTATION:     HPI:  70 y/o F PMH of Stage BARBARA ovarian CA w/ carcinomatosis (currently on Carboplatin AUC 5 / Paclitaxel 175mg/m2 s/p C5 on 11/18/24; follows w/ chintan), HFrEF (EF 25%), active tobacco use presented to ED c/o sob w/ associated nonproductive cough and congestion x 2-3 days.  Pt also reports that her grandson has had URI symptoms.  She denies needing more pillows to sleep, unintentional weight gain, LE edema/pain.  She also denies fevers, chills, cp, palpitation, abd pain, N/V, diarrhea. HemeOnc consulted given pancytopenia. Inital w/u significant for pancytopenia w/ wBC 1.72, Hb 6.3, plts 32. CTA chest negative for PE but has findings consistent w/ aspiration PNA vs pneumonitis . Now s/p 2 units PRBC, started on cefepine    REVIEW OF SYSTEMS:  Constitutional, Eyes, ENT, Cardiovascular, Respiratory, Gastrointestinal, Genitourinary, Musculoskeletal, Integumentary, Neurological, Psychiatric, Endocrine, Heme/Lymph, and Allergic/Immunologic review of systems are otherwise negative except as noted in the HPI.    PAST MEDICAL & SURGICAL HISTORY:  Abdominal mass      Adnexal mass      Peritoneal carcinoma      Abdominal pain      Vaginal delivery      Thrombocytosis      H/O CHF      History of biopsy          FAMILY HISTORY:  FH: pancreatic cancer (Sibling)    FHx: lung cancer (Sibling)    FHx: breast cancer (Mother)        SOCIAL HISTORY:    Allergies    No Known Allergies    Intolerances        MEDICATIONS  (STANDING):  carvedilol 6.25 milliGRAM(s) Oral every 12 hours  cefepime  Injectable.      cefepime  Injectable. 2000 milliGRAM(s) IV Push every 8 hours  losartan 12.5 milliGRAM(s) Oral daily  nicotine -  14 mG/24Hr(s) Patch 1 Patch Transdermal daily  spironolactone 12.5 milliGRAM(s) Oral daily    MEDICATIONS  (PRN):  acetaminophen     Tablet .. 650 milliGRAM(s) Oral every 6 hours PRN Temp greater or equal to 38C (100.4F), Mild Pain (1 - 3)  albuterol/ipratropium for Nebulization 3 milliLiter(s) Nebulizer every 6 hours PRN Shortness of Breath and/or Wheezing  aluminum hydroxide/magnesium hydroxide/simethicone Suspension 30 milliLiter(s) Oral every 4 hours PRN Dyspepsia  guaiFENesin Oral Liquid (Sugar-Free) 200 milliGRAM(s) Oral every 6 hours PRN Cough  melatonin 3 milliGRAM(s) Oral at bedtime PRN Insomnia  ondansetron Injectable 4 milliGRAM(s) IV Push every 8 hours PRN Nausea and/or Vomiting  traMADol 50 milliGRAM(s) Oral every 6 hours PRN Severe Pain (7 - 10)      Vital Signs Last 24 Hrs  T(C): 36.4 (29 Nov 2024 08:10), Max: 36.6 (29 Nov 2024 06:26)  T(F): 97.5 (29 Nov 2024 08:10), Max: 97.8 (29 Nov 2024 06:26)  HR: 69 (29 Nov 2024 08:10) (69 - 81)  BP: 136/59 (29 Nov 2024 08:10) (116/64 - 144/98)  BP(mean): 85 (29 Nov 2024 08:10) (74 - 85)  RR: 20 (29 Nov 2024 08:10) (18 - 20)  SpO2: 100% (29 Nov 2024 08:10) (96% - 100%)    Parameters below as of 29 Nov 2024 08:10  Patient On (Oxygen Delivery Method): nasal cannula  O2 Flow (L/min): 1      PHYSICAL EXAM:    GENERAL: NAD, well-groomed, well-developed  HEAD:  Atraumatic, Normocephalic  EYES: EOMI, PERRLA, conjunctiva and sclera clear  ENMT: No tonsillar erythema, exudates, or enlargement; Moist mucous membranes, Good dentition, No lesions  NECK: Supple, No JVD, Normal thyroid  NERVOUS SYSTEM:  Alert & Oriented X3, Good concentration; Motor Strength 5/5 B/L upper and lower extremities; DTRs 2+ intact and symmetric  CHEST/LUNG: Clear to auscultation bilaterally; No rales, rhonchi, wheezing, or rubs  HEART: Regular rate and rhythm; No murmurs, rubs, or gallops  ABDOMEN: Soft, Nontender, Nondistended; Bowel sounds present  EXTREMITIES:  2+ Peripheral Pulses, No clubbing, cyanosis, or edema  LYMPH: No lymphadenopathy noted  SKIN: No rashes or lesions      LABS:                        6.3    1.72  )-----------( 32       ( 29 Nov 2024 03:15 )             18.3     11-29    132[L]  |  96  |  13.5  ----------------------------<  98  4.6   |  27.0  |  0.63    Ca    8.6      29 Nov 2024 03:15    TPro  5.8[L]  /  Alb  3.3  /  TBili  0.2[L]  /  DBili  x   /  AST  14  /  ALT  11  /  AlkPhos  78  11-29    PT/INR - ( 29 Nov 2024 03:15 )   PT: 12.0 sec;   INR: 1.03 ratio         PTT - ( 29 Nov 2024 03:15 )  PTT:26.5 sec  Urinalysis Basic - ( 29 Nov 2024 03:15 )    Color: x / Appearance: x / SG: x / pH: x  Gluc: 98 mg/dL / Ketone: x  / Bili: x / Urobili: x   Blood: x / Protein: x / Nitrite: x   Leuk Esterase: x / RBC: x / WBC x   Sq Epi: x / Non Sq Epi: x / Bacteria: x          RADIOLOGY & ADDITIONAL STUDIES:    PATHOLOGY:

## 2024-11-29 NOTE — ED PROVIDER NOTE - OBJECTIVE STATEMENT
70 y/o female w/hx of ovarian CA w/peritoneal carcinomatosis, new onset HFrEF presenting to the ER with two days of sob worse at night. Endorsing cough, congestion, chills. Has been around her grandson who has URI like symptoms. Denies CP, back pain, abd pain, swelling/pain of the lower extremities. Last chemo treatment was 1.5wks ago. Told she will have a hysterectomy as part of her treatment plan.

## 2024-11-29 NOTE — ED PROVIDER NOTE - CLINICAL SUMMARY MEDICAL DECISION MAKING FREE TEXT BOX
70 y/o female w/peritoneal carcinomatosis and hx of HF presenting with two days of productive cough, chills, sob worse at night. Not c/o chest pain and no evidence of fluid overload, however given pt is being actively treated for malignancy and is moderate risk for PE will obtain CTA Chest in addition to labs/EKG.

## 2024-11-29 NOTE — ED ADULT NURSE REASSESSMENT NOTE - NS ED NURSE REASSESS COMMENT FT1
Report received from Latoya night shift RN. Pt resting comfortably in stretcher receiving first unit of PRBC at 125 ml/hr. Pt offers no complaints @ this time. Respirations even & unlabored. NAD. Remains on CM in NSR with . Pending bed in CDU @ this time. Pt made aware of plan of care and verbalized understanding.

## 2024-11-29 NOTE — H&P ADULT - HISTORY OF PRESENT ILLNESS
70 y/o F PMH of Stage BARBARA ovarian CA w/ carcinomatosis (currently on Carboplatin AUC 5 / Paclitaxel 175mg/m2 s/p C5 on 11/18/24; follows w/ chintan), HFrEF (EF 25%), active tobacco use presented to ED c/o sob w/ associated nonproductive cough and congestion x 2-3 days.  Pt also reports that her grandson has had URI symptoms.  She denies needing more pillows to sleep, unintentional weight gain, LE edema/pain.  She also denies fevers, chills, cp, palpitation, abd pain, N/V, diarrhea.

## 2024-11-29 NOTE — ED ADULT NURSE NOTE - OBJECTIVE STATEMENT
Assumed care of pt at 0220. Pt is A&Ox4. Respirations are even and unlabored. Pt present to the ED ***. ED provider evaluating, plan of care ongoing. Assumed care of pt at 0220. Pt is A&Ox4. Respirations are even and unlabored. Pt present to the ED for shortness of breath and flu-like symptoms. Pt states she has had cough for 2 days and "trouble breathing". Pt current everyday smoker about 10 cigarettes a day. Hx of CHF and ovarian and stomach cancer, currently receiving chemo. ED provider evaluating, plan of care ongoing.

## 2024-11-29 NOTE — ED PROVIDER NOTE - ATTENDING CONTRIBUTION TO CARE
Stephanie: I performed a face to face evaluation of this patient and performed a full history and physical examination on the patient.  I agree with the resident's history, physical examination, and plan of the patient unless otherwise noted. My brief assessment is as follows: hx ovarian cancer on chemo (last chemo 1.5 weeks ago), new HFrEF p/w 2 days of worsening sob with cough/congestion and sick contact in family. no brbpr/melena. no cp/abd pain. no f/c. no abd distension. no signifant new leg swelling. no other complaints. initial pulse ~110, nl sat, with some pallor. ctab, abd benign/not significantly distended. neuro intact. labs, ct, symptom control, reassess.

## 2024-11-29 NOTE — ED ADULT NURSE REASSESSMENT NOTE - NS ED NURSE REASSESS COMMENT FT1
Patient verbalizes need for 2 units of PRBCs. Consent obtained. Vitals checked, type and screen in place. Transfusion initiated. Patient educated on S/S of transfusion reaction. Teach Back done. Patient verbalized understanding and to make RN/provider aware if symptoms occur immediately.

## 2024-11-29 NOTE — H&P ADULT - ASSESSMENT
68 y/o F PMH of Stage BARBARA ovarian CA w/ carcinomatosis (currently on Carboplatin AUC 5 / Paclitaxel 175mg/m2 s/p C5 on 11/18/24; follows w/ chintan), HFrEF (EF 25%), active tobacco use presented to ED c/o sob w/ associated nonproductive cough and congestion x 2-3 days and reported being around her grandson who has URI symptoms.  VS stable and WNL while in ED.  Clinically toxic appearing w/ audible nasal congestion but clear to auscultation on exam.  Inital w/u significant for pancytopenia w/ Hb 6.3 but diff still pending.  CTA chest negative for PE but noted to have findings consistent w/ aspiration PNA vs pneumonitis.  Received cefepime 2g, 2u PRBC and 1 duoneb in ED.  Will admit for pancytopenia       SOB likely 2/2 anemia vs URI vs aspiration pneumonia/pneumonitis   - Not hypoxic and VS stable w/ normal wob but has nasal congestion and toxic appearing   - Flu/covid/RSV negative but will order full RVP   - CTA chest negative for PE but has findings consistent w/ aspiration PNA vs pneumonitis    - Hb lower than baseline at 6.3 (baseline 8-10)  -  2u PRBC ordered by ED overnight but transfusions just started but anticipate will help symptoms   - BNP mildly elevated but clinically hypovolemic and CT chest w/ no pulm edema/effusions therefore low suspicion for CHF exacerbation  - c/w empiric cefepime pending infectious w/u   - Will order incentive spirometer, flutter valve, chest PT and prn expectorant given evidence of mucus plugging   - At this time not hypoxic or in respiratory distress, therefor will hold off on mucomyst   - No sputum at this time to send cultures   - Aspiration precautions, duonebs prn and encourage OOB to chair   - Monitor on O2       Leukopenia w/ suspicion for neutropenia w/ concern for underlying infection vs chemo induced    - Suspect neutropenic but unable to calculate ANC as diff pending   - Also awaiting diff to assess for bandemia   - Did not fit SIRS criteria therefore no UA or cultures ordered in ED but was given cefepime   - Flu/covid/RSV negative but will order full RVP   - CTA chest negative for PE but has findings consistent w/ aspiration PNA vs pneumonitis    - Lactate normal   - Clinically toxic appearing and is immunocompromised therefore will order UA and Bcxs however of note has already received Abx in ED  - Will c/w cefepime empirically as suspect pt is neutropenic but if all infectious w/u negative then d/c abx   - No befit to procal as is unreliable in setting of active cancer  - Will order MRSA pcr   - Trend CBC and monitor temperature       Pancytopenia possibly multifactorial 2/2 chemo in the setting of underlying infection   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 Martinez 11/11/24 but no plan for interval debulk at this time  - WBC 1.72 but diff pending to calculate ANC although suspect will be neutropenic   - Per chart review pt has been pancytopenic prior for chemo but currently lower than prior   - Hb ranges 8-10 on average and today 6.4  - Hemodynamically stable w/ no active bleeding reported   - Hemolysis considered but Tbili low therefore less likely but awaiting diff to assess for schistocytes   - Plts 115 11/18 but today 32  - s/p 2u PRBC in Ed   - Repeat CBC w/ diff at noon   - Transfuse for Hb<8 or plts <20K in setting of sepsis or <50K if bleeding   - Will consult heme/onc       Chronic HFrEF / NICM  - Clinically hypovolemic on exam   - Newly diagnosed s/p ECHO/LHC (8/23/24) at which time LVEF was 25-30%, mod MR, trace AR  - BNP 1328 which could be from sepsis as clinically not vol overloaded   - CT chest w/ no pulm edema/effusions  - c/w on Spironolactone, Losartan, Coreg   - Monitor daily weights, strict I/o's   - Maintain k>4 and Mg>2      Hypovolemic mild hyponatremia   - Likely from poor po intake   - Repeat chemistry in afternoon to monitor   - Encourage po intake        Active tobacco use  - Counseled on abstaining from smoking   - Nicotine patch offered         VTE ppx: SCDs given plts <50K    Dispo: Acute.  Will consult PT to assess if will require home needs vs [facility] once pt is medically stable for d/c    70 y/o F PMH of Stage BARBARA ovarian CA w/ carcinomatosis (currently on Carboplatin AUC 5 / Paclitaxel 175mg/m2 s/p C5 on 11/18/24; follows w/ chintan), HFrEF (EF 25%), active tobacco use presented to ED c/o sob w/ associated nonproductive cough and congestion x 2-3 days and reported being around her grandson who has URI symptoms.  VS stable and WNL while in ED.  Clinically toxic appearing w/ audible nasal congestion but clear to auscultation on exam.  Inital w/u significant for pancytopenia w/ wBC 1.72, Hb 6.3, plts 32 but diff still pending, Trop neg, [id number], EKG unchanged from prior and w/ no acute ischemic changes.  CTA chest negative for PE but noted to have findings consistent w/ aspiration PNA vs pneumonitis.  Received cefepime 2g, 2u PRBC and 1 duoneb in ED.  Will admit for pancytopenia       SOB likely 2/2 anemia vs URI vs aspiration pneumonia/pneumonitis   - Not hypoxic and VS stable w/ normal wob but has nasal congestion and toxic appearing   - Flu/covid/RSV negative but will order full RVP   - CTA chest negative for PE but has findings consistent w/ aspiration PNA vs pneumonitis    - Hb lower than baseline at 6.3 (baseline 8-10)  -  2u PRBC ordered by ED overnight but transfusions just started but anticipate will help symptoms   - BNP mildly elevated but clinically hypovolemic and CT chest w/ no pulm edema/effusions therefore low suspicion for CHF exacerbation  - c/w empiric cefepime pending infectious w/u   - Will order incentive spirometer, flutter valve, chest PT and prn expectorant given evidence of mucus plugging   - At this time not hypoxic or in respiratory distress, therefor will hold off on mucomyst   - No sputum at this time to send cultures   - Aspiration precautions, duonebs prn and encourage OOB to chair   - Monitor on O2       Leukopenia w/ suspicion for neutropenia w/ concern for underlying infection vs chemo induced    - Suspect neutropenic but unable to calculate ANC as diff pending   - Also awaiting diff to assess for bandemia   - Did not fit SIRS criteria therefore no UA or cultures ordered in ED but was given cefepime   - Flu/covid/RSV negative but will order full RVP   - CTA chest negative for PE but has findings consistent w/ aspiration PNA vs pneumonitis    - Lactate normal   - Clinically toxic appearing and is immunocompromised therefore will order UA and Bcxs however of note has already received Abx in ED  - Will c/w cefepime empirically as suspect pt is neutropenic but if all infectious w/u negative then d/c abx   - No befit to procal as is unreliable in setting of active cancer  - Will order MRSA pcr   - Trend CBC and monitor temperature       Pancytopenia possibly multifactorial 2/2 chemo in the setting of underlying infection   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 Martinez 11/11/24 but no plan for interval debulk at this time  - WBC 1.72 but diff pending to calculate ANC although suspect will be neutropenic   - Per chart review pt has been pancytopenic prior for chemo but currently lower than prior   - Hb ranges 8-10 on average and today 6.4  - Hemodynamically stable w/ no active bleeding reported   - Hemolysis considered but Tbili low therefore less likely but awaiting diff to assess for schistocytes   - Plts 115 11/18 but today 32  - s/p 2u PRBC in Ed   - Repeat CBC w/ diff at noon   - Transfuse for Hb<8 or plts <20K in setting of sepsis or <50K if bleeding   - Will consult heme/onc       Chronic HFrEF / NICM  - Clinically hypovolemic on exam   - Newly diagnosed s/p ECHO/LHC (8/23/24) at which time LVEF was 25-30%, mod MR, trace AR  - BNP 1328 which could be from sepsis as clinically not vol overloaded   - Trop negative and EKG w/ nonspecific ST changes but unchanged and no acute ischemic changes   - CT chest w/ no pulm edema/effusions  - c/w on Spironolactone, Losartan, Coreg   - Monitor daily weights, strict I/o's   - Maintain k>4 and Mg>2      Hypovolemic mild hyponatremia   - Likely from poor po intake   - Repeat chemistry in afternoon to monitor   - Encourage po intake        Active tobacco use  - Counseled on abstaining from smoking   - Nicotine patch offered         VTE ppx: SCDs given plts <50K    Dispo: Acute.  Will consult PT to assess if will require home needs vs [facility] once pt is medically stable for d/c

## 2024-11-30 LAB
ACANTHOCYTES BLD QL SMEAR: SLIGHT — SIGNIFICANT CHANGE UP
ALBUMIN SERPL ELPH-MCNC: 3.2 G/DL — LOW (ref 3.3–5.2)
ALP SERPL-CCNC: 77 U/L — SIGNIFICANT CHANGE UP (ref 40–120)
ALT FLD-CCNC: 9 U/L — SIGNIFICANT CHANGE UP
ANION GAP SERPL CALC-SCNC: 10 MMOL/L — SIGNIFICANT CHANGE UP (ref 5–17)
ANISOCYTOSIS BLD QL: SLIGHT — SIGNIFICANT CHANGE UP
AST SERPL-CCNC: 12 U/L — SIGNIFICANT CHANGE UP
BASOPHILS # BLD AUTO: 0.01 K/UL — SIGNIFICANT CHANGE UP (ref 0–0.2)
BASOPHILS # BLD AUTO: 0.02 K/UL — SIGNIFICANT CHANGE UP (ref 0–0.2)
BASOPHILS NFR BLD AUTO: 0.6 % — SIGNIFICANT CHANGE UP (ref 0–2)
BASOPHILS NFR BLD AUTO: 0.9 % — SIGNIFICANT CHANGE UP (ref 0–2)
BILIRUB SERPL-MCNC: 0.6 MG/DL — SIGNIFICANT CHANGE UP (ref 0.4–2)
BUN SERPL-MCNC: 11.1 MG/DL — SIGNIFICANT CHANGE UP (ref 8–20)
CALCIUM SERPL-MCNC: 8.9 MG/DL — SIGNIFICANT CHANGE UP (ref 8.4–10.5)
CHLORIDE SERPL-SCNC: 101 MMOL/L — SIGNIFICANT CHANGE UP (ref 96–108)
CO2 SERPL-SCNC: 24 MMOL/L — SIGNIFICANT CHANGE UP (ref 22–29)
CREAT SERPL-MCNC: 0.47 MG/DL — LOW (ref 0.5–1.3)
DACRYOCYTES BLD QL SMEAR: SLIGHT — SIGNIFICANT CHANGE UP
EGFR: 102 ML/MIN/1.73M2 — SIGNIFICANT CHANGE UP
ELLIPTOCYTES BLD QL SMEAR: SLIGHT — SIGNIFICANT CHANGE UP
EOSINOPHIL # BLD AUTO: 0.02 K/UL — SIGNIFICANT CHANGE UP (ref 0–0.5)
EOSINOPHIL # BLD AUTO: 0.03 K/UL — SIGNIFICANT CHANGE UP (ref 0–0.5)
EOSINOPHIL NFR BLD AUTO: 0.9 % — SIGNIFICANT CHANGE UP (ref 0–6)
EOSINOPHIL NFR BLD AUTO: 1.7 % — SIGNIFICANT CHANGE UP (ref 0–6)
GIANT PLATELETS BLD QL SMEAR: PRESENT — SIGNIFICANT CHANGE UP
GLUCOSE SERPL-MCNC: 103 MG/DL — HIGH (ref 70–99)
HCT VFR BLD CALC: 28.9 % — LOW (ref 34.5–45)
HGB BLD-MCNC: 9.8 G/DL — LOW (ref 11.5–15.5)
IMM GRANULOCYTES NFR BLD AUTO: 0 % — SIGNIFICANT CHANGE UP (ref 0–0.9)
LYMPHOCYTES # BLD AUTO: 0.79 K/UL — LOW (ref 1–3.3)
LYMPHOCYTES # BLD AUTO: 1.04 K/UL — SIGNIFICANT CHANGE UP (ref 1–3.3)
LYMPHOCYTES # BLD AUTO: 45.9 % — HIGH (ref 13–44)
LYMPHOCYTES # BLD AUTO: 58.8 % — HIGH (ref 13–44)
MACROCYTES BLD QL: SLIGHT — SIGNIFICANT CHANGE UP
MAGNESIUM SERPL-MCNC: 1.5 MG/DL — LOW (ref 1.6–2.6)
MANUAL SMEAR VERIFICATION: SIGNIFICANT CHANGE UP
MCHC RBC-ENTMCNC: 30.5 PG — SIGNIFICANT CHANGE UP (ref 27–34)
MCHC RBC-ENTMCNC: 33.9 G/DL — SIGNIFICANT CHANGE UP (ref 32–36)
MCV RBC AUTO: 90 FL — SIGNIFICANT CHANGE UP (ref 80–100)
MICROCYTES BLD QL: SIGNIFICANT CHANGE UP
MONOCYTES # BLD AUTO: 0.38 K/UL — SIGNIFICANT CHANGE UP (ref 0–0.9)
MONOCYTES # BLD AUTO: 0.51 K/UL — SIGNIFICANT CHANGE UP (ref 0–0.9)
MONOCYTES NFR BLD AUTO: 22 % — HIGH (ref 2–14)
MONOCYTES NFR BLD AUTO: 28.8 % — HIGH (ref 2–14)
MYELOCYTES NFR BLD: 1.9 % — HIGH (ref 0–0)
NEUTROPHILS # BLD AUTO: 0.18 K/UL — LOW (ref 1.8–7.4)
NEUTROPHILS # BLD AUTO: 0.19 K/UL — LOW (ref 1.8–7.4)
NEUTROPHILS NFR BLD AUTO: 10.1 % — LOW (ref 43–77)
NEUTROPHILS NFR BLD AUTO: 11 % — LOW (ref 43–77)
OVALOCYTES BLD QL SMEAR: SLIGHT — SIGNIFICANT CHANGE UP
PHOSPHATE SERPL-MCNC: 3.6 MG/DL — SIGNIFICANT CHANGE UP (ref 2.4–4.7)
PLAT MORPH BLD: ABNORMAL
PLATELET # BLD AUTO: 28 K/UL — LOW (ref 150–400)
POIKILOCYTOSIS BLD QL AUTO: SLIGHT — SIGNIFICANT CHANGE UP
POLYCHROMASIA BLD QL SMEAR: SIGNIFICANT CHANGE UP
POTASSIUM SERPL-MCNC: 4.5 MMOL/L — SIGNIFICANT CHANGE UP (ref 3.5–5.3)
POTASSIUM SERPL-SCNC: 4.5 MMOL/L — SIGNIFICANT CHANGE UP (ref 3.5–5.3)
PROMYELOCYTES # FLD: 0.9 % — HIGH (ref 0–0)
PROT SERPL-MCNC: 6.1 G/DL — LOW (ref 6.6–8.7)
RBC # BLD: 3.21 M/UL — LOW (ref 3.8–5.2)
RBC # FLD: 22.3 % — HIGH (ref 10.3–14.5)
RBC BLD AUTO: ABNORMAL
SMUDGE CELLS # BLD: PRESENT — SIGNIFICANT CHANGE UP
SODIUM SERPL-SCNC: 135 MMOL/L — SIGNIFICANT CHANGE UP (ref 135–145)
SPHEROCYTES BLD QL SMEAR: SLIGHT — SIGNIFICANT CHANGE UP
VARIANT LYMPHS # BLD: 16.5 % — HIGH (ref 0–6)
WBC # BLD: 1.77 K/UL — LOW (ref 3.8–10.5)
WBC # FLD AUTO: 1.77 K/UL — LOW (ref 3.8–10.5)

## 2024-11-30 PROCEDURE — 99233 SBSQ HOSP IP/OBS HIGH 50: CPT

## 2024-11-30 RX ORDER — IPRATROPIUM BROMIDE AND ALBUTEROL SULFATE 2.5; .5 MG/3ML; MG/3ML
3 SOLUTION RESPIRATORY (INHALATION) EVERY 6 HOURS
Refills: 0 | Status: DISCONTINUED | OUTPATIENT
Start: 2024-11-30 | End: 2024-12-02

## 2024-11-30 RX ADMIN — TRAMADOL HYDROCHLORIDE 50 MILLIGRAM(S): 300 CAPSULE ORAL at 22:59

## 2024-11-30 RX ADMIN — CEFEPIME 2000 MILLIGRAM(S): 2 INJECTION, POWDER, FOR SOLUTION INTRAVENOUS at 21:07

## 2024-11-30 RX ADMIN — CEFEPIME 2000 MILLIGRAM(S): 2 INJECTION, POWDER, FOR SOLUTION INTRAVENOUS at 05:09

## 2024-11-30 RX ADMIN — IPRATROPIUM BROMIDE AND ALBUTEROL SULFATE 3 MILLILITER(S): 2.5; .5 SOLUTION RESPIRATORY (INHALATION) at 17:37

## 2024-11-30 RX ADMIN — CARVEDILOL 6.25 MILLIGRAM(S): 25 TABLET, FILM COATED ORAL at 17:26

## 2024-11-30 RX ADMIN — NICOTINE 1 PATCH: 21 PATCH, EXTENDED RELEASE TRANSDERMAL at 19:30

## 2024-11-30 RX ADMIN — CARVEDILOL 6.25 MILLIGRAM(S): 25 TABLET, FILM COATED ORAL at 05:09

## 2024-11-30 RX ADMIN — IPRATROPIUM BROMIDE AND ALBUTEROL SULFATE 3 MILLILITER(S): 2.5; .5 SOLUTION RESPIRATORY (INHALATION) at 20:31

## 2024-11-30 RX ADMIN — Medication 12.5 MILLIGRAM(S): at 05:10

## 2024-11-30 RX ADMIN — CEFEPIME 2000 MILLIGRAM(S): 2 INJECTION, POWDER, FOR SOLUTION INTRAVENOUS at 13:45

## 2024-11-30 RX ADMIN — Medication 25 GRAM(S): at 13:44

## 2024-11-30 RX ADMIN — NICOTINE 1 PATCH: 21 PATCH, EXTENDED RELEASE TRANSDERMAL at 07:00

## 2024-11-30 RX ADMIN — Medication 200 MILLIGRAM(S): at 13:44

## 2024-11-30 RX ADMIN — NICOTINE 1 PATCH: 21 PATCH, EXTENDED RELEASE TRANSDERMAL at 13:46

## 2024-11-30 RX ADMIN — Medication 200 MILLIGRAM(S): at 21:12

## 2024-11-30 RX ADMIN — LOSARTAN POTASSIUM 12.5 MILLIGRAM(S): 100 TABLET, FILM COATED ORAL at 05:09

## 2024-11-30 NOTE — PROGRESS NOTE ADULT - ASSESSMENT
68 y/o F PMH of Stage BARBARA ovarian CA w/ carcinomatosis (currently on Carboplatin AUC 5 / Paclitaxel 175mg/m2 s/p C5 on 11/18/24; follows erika/ chintan), HFrEF (EF 25%), active tobacco use presented to ED c/o sob w/ associated nonproductive cough and congestion x 2-3 days and reported being around her grandson who has URI symptoms.  VS stable and WNL while in ED.  Clinically toxic appearing w/ audible nasal congestion but clear to auscultation on exam.  Inital w/u significant for pancytopenia w/ wBC 1.72, Hb 6.3, plts 32. rop neg, [id number], EKG unchanged from prior and w/ no acute ischemic changes.  CTA chest negative for PE but noted to have findings consistent w/ aspiration PNA vs pneumonitis.  Received cefepime 2g, 2u PRBC and 1 duoneb in ED.      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 Martinez 11/11/24 but no plan for interval debulk at this time  - Hemodynamically stable w/ no active bleeding reported   - s/p 2u PRBC in Ed   - Transfuse for Hb<8 or plts <20K in setting of sepsis or <50K if bleeding     Chronic HFrEF / NICM  - Clinically hypovolemic on exam   - Newly diagnosed s/p ECHO/LHC (8/23/24) at which time LVEF was 25-30%, mod MR, trace AR  - Trop negative and EKG w/ nonspecific ST changes but unchanged and no acute ischemic changes   - CT chest w/ no pulm edema/effusions  - c/w on Spironolactone, Losartan, Coreg   - Monitor daily weights, strict I/o's   - Maintain k>4 and Mg>2      Active tobacco use  - Counseled on abstaining from smoking   - Nicotine patch offered         VTE ppx: SCDs given plts <50K    Dispo: Acute.  Will consult PT to assess if will require home needs vs [facility] once pt is medically stable for d/c  70 y/o F PMH of Stage BARBARA ovarian CA w/ carcinomatosis (currently on Carboplatin AUC 5 / Paclitaxel 175mg/m2 s/p C5 on 11/18/24; follows w/ chintan), HFrEF (EF 25%), active tobacco use presented to ED c/o sob w/ associated nonproductive cough and congestion x 2-3 days and reported being around her grandson who has URI symptoms.  VS stable and WNL while in ED.  Clinically toxic appearing w/ audible nasal congestion but clear to auscultation on exam.  Inital w/u significant for pancytopenia w/ wBC 1.72, Hb 6.3, plts 32. rop neg, [id number], EKG unchanged from prior and w/ no acute ischemic changes.  CTA chest negative for PE but noted to have findings consistent w/ aspiration PNA vs pneumonitis.  Received cefepime 2g, 2u PRBC and 1 duoneb in ED.      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 Martinez 11/11/24 but no plan for interval debulk at this time  - Hemodynamically stable w/ no active bleeding reported   - s/p 2u PRBC in Ed   - Transfuse for Hb<8 or plts <20K in setting of sepsis or <50K if bleeding     Chronic HFrEF / NICM  - Clinically hypovolemic on exam   - Newly diagnosed s/p ECHO/LHC (8/23/24) at which time LVEF was 25-30%, mod MR, trace AR  - Trop negative and EKG w/ nonspecific ST changes but unchanged and no acute ischemic changes   - CT chest w/ no pulm edema/effusions  - c/w on Spironolactone, Losartan, Coreg   - Monitor daily weights, strict I/o's   - Maintain k>4 and Mg>2      Active tobacco use  - Counseled on abstaining from smoking   - Nicotine patch offered         VTE ppx: SCDs given plts <50K    Dispo: Acute.  Will consult PT to assess if will require home needs vs [facility] once pt is medically stable for d/c

## 2024-11-30 NOTE — PROGRESS NOTE ADULT - SUBJECTIVE AND OBJECTIVE BOX
CC: Follow up     INTERVAL HPI/OVERNIGHT EVENTS: Patient seen and examined, afebrile.       Vital Signs Last 24 Hrs  T(C): 36.3 (30 Nov 2024 08:47), Max: 37.2 (29 Nov 2024 16:00)  T(F): 97.4 (30 Nov 2024 08:47), Max: 99 (29 Nov 2024 16:00)  HR: 85 (30 Nov 2024 08:47) (74 - 94)  BP: 113/67 (30 Nov 2024 08:47) (113/67 - 162/68)  BP(mean): --  RR: 18 (30 Nov 2024 04:42) (18 - 18)  SpO2: 95% (30 Nov 2024 08:47) (95% - 100%)    Parameters below as of 30 Nov 2024 09:40  Patient On (Oxygen Delivery Method): room air        PHYSICAL EXAM:    GENERAL: NAD, AOX3  CHEST/LUNG: Clear to auscultation bilaterally; No rales, rhonchi, wheezing, or rubs  HEART: Regular rate and rhythm; No murmurs, rubs, or gallops  ABDOMEN: Soft, Nontender, Nondistended; Bowel sounds present  EXTREMITIES:  2+ Peripheral Pulses, No clubbing, cyanosis, or edema        MEDICATIONS  (STANDING):  carvedilol 6.25 milliGRAM(s) Oral every 12 hours  cefepime  Injectable.      cefepime  Injectable. 2000 milliGRAM(s) IV Push every 8 hours  losartan 12.5 milliGRAM(s) Oral daily  magnesium sulfate  IVPB 2 Gram(s) IV Intermittent once  nicotine -  14 mG/24Hr(s) Patch 1 Patch Transdermal daily  spironolactone 12.5 milliGRAM(s) Oral daily    MEDICATIONS  (PRN):  acetaminophen     Tablet .. 650 milliGRAM(s) Oral every 6 hours PRN Temp greater or equal to 38C (100.4F), Mild Pain (1 - 3)  albuterol/ipratropium for Nebulization 3 milliLiter(s) Nebulizer every 6 hours PRN Shortness of Breath and/or Wheezing  aluminum hydroxide/magnesium hydroxide/simethicone Suspension 30 milliLiter(s) Oral every 4 hours PRN Dyspepsia  guaiFENesin Oral Liquid (Sugar-Free) 200 milliGRAM(s) Oral every 6 hours PRN Cough  melatonin 3 milliGRAM(s) Oral at bedtime PRN Insomnia  ondansetron Injectable 4 milliGRAM(s) IV Push every 8 hours PRN Nausea and/or Vomiting  traMADol 50 milliGRAM(s) Oral every 6 hours PRN Severe Pain (7 - 10)      Allergies    No Known Allergies    Intolerances          LABS:                          9.8    1.77  )-----------( 28       ( 30 Nov 2024 07:07 )             28.9     11-30    135  |  101  |  11.1  ----------------------------<  103[H]  4.5   |  24.0  |  0.47[L]    Ca    8.9      30 Nov 2024 07:07  Phos  3.6     11-30  Mg     1.5     11-30    TPro  6.1[L]  /  Alb  3.2[L]  /  TBili  0.6  /  DBili  x   /  AST  12  /  ALT  9   /  AlkPhos  77  11-30    PT/INR - ( 29 Nov 2024 03:15 )   PT: 12.0 sec;   INR: 1.03 ratio         PTT - ( 29 Nov 2024 03:15 )  PTT:26.5 sec  Urinalysis Basic - ( 30 Nov 2024 07:07 )    Color: x / Appearance: x / SG: x / pH: x  Gluc: 103 mg/dL / Ketone: x  / Bili: x / Urobili: x   Blood: x / Protein: x / Nitrite: x   Leuk Esterase: x / RBC: x / WBC x   Sq Epi: x / Non Sq Epi: x / Bacteria: x        RADIOLOGY & ADDITIONAL TESTS:   CC: Follow up     INTERVAL HPI/OVERNIGHT EVENTS: Patient seen and examined, afebrile.  Complaints of cough and wheezing     Vital Signs Last 24 Hrs  T(C): 36.3 (30 Nov 2024 08:47), Max: 37.2 (29 Nov 2024 16:00)  T(F): 97.4 (30 Nov 2024 08:47), Max: 99 (29 Nov 2024 16:00)  HR: 85 (30 Nov 2024 08:47) (74 - 94)  BP: 113/67 (30 Nov 2024 08:47) (113/67 - 162/68)  BP(mean): --  RR: 18 (30 Nov 2024 04:42) (18 - 18)  SpO2: 95% (30 Nov 2024 08:47) (95% - 100%)    Parameters below as of 30 Nov 2024 09:40  Patient On (Oxygen Delivery Method): room air        PHYSICAL EXAM:    GENERAL: NAD, AOX3  CHEST/LUNG: Bilateral expiratory wheezing   HEART: Regular rate and rhythm; No murmurs, rubs, or gallops  ABDOMEN: Soft, Nontender, Nondistended; Bowel sounds present  EXTREMITIES:  2+ Peripheral Pulses, No clubbing, cyanosis, or edema        MEDICATIONS  (STANDING):  carvedilol 6.25 milliGRAM(s) Oral every 12 hours  cefepime  Injectable.      cefepime  Injectable. 2000 milliGRAM(s) IV Push every 8 hours  losartan 12.5 milliGRAM(s) Oral daily  magnesium sulfate  IVPB 2 Gram(s) IV Intermittent once  nicotine -  14 mG/24Hr(s) Patch 1 Patch Transdermal daily  spironolactone 12.5 milliGRAM(s) Oral daily    MEDICATIONS  (PRN):  acetaminophen     Tablet .. 650 milliGRAM(s) Oral every 6 hours PRN Temp greater or equal to 38C (100.4F), Mild Pain (1 - 3)  albuterol/ipratropium for Nebulization 3 milliLiter(s) Nebulizer every 6 hours PRN Shortness of Breath and/or Wheezing  aluminum hydroxide/magnesium hydroxide/simethicone Suspension 30 milliLiter(s) Oral every 4 hours PRN Dyspepsia  guaiFENesin Oral Liquid (Sugar-Free) 200 milliGRAM(s) Oral every 6 hours PRN Cough  melatonin 3 milliGRAM(s) Oral at bedtime PRN Insomnia  ondansetron Injectable 4 milliGRAM(s) IV Push every 8 hours PRN Nausea and/or Vomiting  traMADol 50 milliGRAM(s) Oral every 6 hours PRN Severe Pain (7 - 10)      Allergies    No Known Allergies    Intolerances          LABS:                          9.8    1.77  )-----------( 28       ( 30 Nov 2024 07:07 )             28.9     11-30    135  |  101  |  11.1  ----------------------------<  103[H]  4.5   |  24.0  |  0.47[L]    Ca    8.9      30 Nov 2024 07:07  Phos  3.6     11-30  Mg     1.5     11-30    TPro  6.1[L]  /  Alb  3.2[L]  /  TBili  0.6  /  DBili  x   /  AST  12  /  ALT  9   /  AlkPhos  77  11-30    PT/INR - ( 29 Nov 2024 03:15 )   PT: 12.0 sec;   INR: 1.03 ratio         PTT - ( 29 Nov 2024 03:15 )  PTT:26.5 sec  Urinalysis Basic - ( 30 Nov 2024 07:07 )    Color: x / Appearance: x / SG: x / pH: x  Gluc: 103 mg/dL / Ketone: x  / Bili: x / Urobili: x   Blood: x / Protein: x / Nitrite: x   Leuk Esterase: x / RBC: x / WBC x   Sq Epi: x / Non Sq Epi: x / Bacteria: x        RADIOLOGY & ADDITIONAL TESTS:

## 2024-12-01 LAB
ALBUMIN SERPL ELPH-MCNC: 3.3 G/DL — SIGNIFICANT CHANGE UP (ref 3.3–5.2)
ALP SERPL-CCNC: 75 U/L — SIGNIFICANT CHANGE UP (ref 40–120)
ALT FLD-CCNC: 11 U/L — SIGNIFICANT CHANGE UP
ANION GAP SERPL CALC-SCNC: 8 MMOL/L — SIGNIFICANT CHANGE UP (ref 5–17)
AST SERPL-CCNC: 13 U/L — SIGNIFICANT CHANGE UP
BASOPHILS # BLD AUTO: 0.02 K/UL — SIGNIFICANT CHANGE UP (ref 0–0.2)
BASOPHILS NFR BLD AUTO: 0.9 % — SIGNIFICANT CHANGE UP (ref 0–2)
BILIRUB SERPL-MCNC: 0.4 MG/DL — SIGNIFICANT CHANGE UP (ref 0.4–2)
BUN SERPL-MCNC: 16.5 MG/DL — SIGNIFICANT CHANGE UP (ref 8–20)
CALCIUM SERPL-MCNC: 9.2 MG/DL — SIGNIFICANT CHANGE UP (ref 8.4–10.5)
CHLORIDE SERPL-SCNC: 99 MMOL/L — SIGNIFICANT CHANGE UP (ref 96–108)
CO2 SERPL-SCNC: 26 MMOL/L — SIGNIFICANT CHANGE UP (ref 22–29)
CREAT SERPL-MCNC: 0.64 MG/DL — SIGNIFICANT CHANGE UP (ref 0.5–1.3)
EGFR: 94 ML/MIN/1.73M2 — SIGNIFICANT CHANGE UP
EOSINOPHIL # BLD AUTO: 0 K/UL — SIGNIFICANT CHANGE UP (ref 0–0.5)
EOSINOPHIL NFR BLD AUTO: 0 % — SIGNIFICANT CHANGE UP (ref 0–6)
GLUCOSE SERPL-MCNC: 107 MG/DL — HIGH (ref 70–99)
HCT VFR BLD CALC: 29.6 % — LOW (ref 34.5–45)
HGB BLD-MCNC: 9.8 G/DL — LOW (ref 11.5–15.5)
LYMPHOCYTES # BLD AUTO: 1.43 K/UL — SIGNIFICANT CHANGE UP (ref 1–3.3)
LYMPHOCYTES # BLD AUTO: 64.6 % — HIGH (ref 13–44)
MAGNESIUM SERPL-MCNC: 1.7 MG/DL — SIGNIFICANT CHANGE UP (ref 1.6–2.6)
MCHC RBC-ENTMCNC: 30.7 PG — SIGNIFICANT CHANGE UP (ref 27–34)
MCHC RBC-ENTMCNC: 33.1 G/DL — SIGNIFICANT CHANGE UP (ref 32–36)
MCV RBC AUTO: 92.8 FL — SIGNIFICANT CHANGE UP (ref 80–100)
MONOCYTES # BLD AUTO: 0.45 K/UL — SIGNIFICANT CHANGE UP (ref 0–0.9)
MONOCYTES NFR BLD AUTO: 20.3 % — HIGH (ref 2–14)
NEUTROPHILS # BLD AUTO: 0.18 K/UL — LOW (ref 1.8–7.4)
NEUTROPHILS NFR BLD AUTO: 8 % — LOW (ref 43–77)
NT-PROBNP SERPL-SCNC: 1516 PG/ML — HIGH (ref 0–300)
PLATELET # BLD AUTO: 25 K/UL — LOW (ref 150–400)
POTASSIUM SERPL-MCNC: 4.6 MMOL/L — SIGNIFICANT CHANGE UP (ref 3.5–5.3)
POTASSIUM SERPL-SCNC: 4.6 MMOL/L — SIGNIFICANT CHANGE UP (ref 3.5–5.3)
PROT SERPL-MCNC: 6 G/DL — LOW (ref 6.6–8.7)
RBC # BLD: 3.19 M/UL — LOW (ref 3.8–5.2)
RBC # FLD: 22.2 % — HIGH (ref 10.3–14.5)
SODIUM SERPL-SCNC: 133 MMOL/L — LOW (ref 135–145)
WBC # BLD: 2.22 K/UL — LOW (ref 3.8–10.5)
WBC # FLD AUTO: 2.22 K/UL — LOW (ref 3.8–10.5)

## 2024-12-01 PROCEDURE — 99233 SBSQ HOSP IP/OBS HIGH 50: CPT

## 2024-12-01 RX ORDER — FUROSEMIDE 40 MG/1
20 TABLET ORAL ONCE
Refills: 0 | Status: COMPLETED | OUTPATIENT
Start: 2024-12-01 | End: 2024-12-01

## 2024-12-01 RX ADMIN — NICOTINE 1 PATCH: 21 PATCH, EXTENDED RELEASE TRANSDERMAL at 12:39

## 2024-12-01 RX ADMIN — TRAMADOL HYDROCHLORIDE 50 MILLIGRAM(S): 300 CAPSULE ORAL at 00:00

## 2024-12-01 RX ADMIN — FUROSEMIDE 20 MILLIGRAM(S): 40 TABLET ORAL at 11:38

## 2024-12-01 RX ADMIN — CEFEPIME 2000 MILLIGRAM(S): 2 INJECTION, POWDER, FOR SOLUTION INTRAVENOUS at 05:07

## 2024-12-01 RX ADMIN — CARVEDILOL 6.25 MILLIGRAM(S): 25 TABLET, FILM COATED ORAL at 17:27

## 2024-12-01 RX ADMIN — CEFEPIME 2000 MILLIGRAM(S): 2 INJECTION, POWDER, FOR SOLUTION INTRAVENOUS at 21:39

## 2024-12-01 RX ADMIN — Medication 12.5 MILLIGRAM(S): at 05:07

## 2024-12-01 RX ADMIN — TRAMADOL HYDROCHLORIDE 50 MILLIGRAM(S): 300 CAPSULE ORAL at 21:39

## 2024-12-01 RX ADMIN — LOSARTAN POTASSIUM 12.5 MILLIGRAM(S): 100 TABLET, FILM COATED ORAL at 05:07

## 2024-12-01 RX ADMIN — NICOTINE 1 PATCH: 21 PATCH, EXTENDED RELEASE TRANSDERMAL at 19:39

## 2024-12-01 RX ADMIN — NICOTINE 1 PATCH: 21 PATCH, EXTENDED RELEASE TRANSDERMAL at 12:40

## 2024-12-01 RX ADMIN — NICOTINE 1 PATCH: 21 PATCH, EXTENDED RELEASE TRANSDERMAL at 07:21

## 2024-12-01 RX ADMIN — IPRATROPIUM BROMIDE AND ALBUTEROL SULFATE 3 MILLILITER(S): 2.5; .5 SOLUTION RESPIRATORY (INHALATION) at 08:47

## 2024-12-01 RX ADMIN — IPRATROPIUM BROMIDE AND ALBUTEROL SULFATE 3 MILLILITER(S): 2.5; .5 SOLUTION RESPIRATORY (INHALATION) at 14:56

## 2024-12-01 RX ADMIN — IPRATROPIUM BROMIDE AND ALBUTEROL SULFATE 3 MILLILITER(S): 2.5; .5 SOLUTION RESPIRATORY (INHALATION) at 20:40

## 2024-12-01 RX ADMIN — CARVEDILOL 6.25 MILLIGRAM(S): 25 TABLET, FILM COATED ORAL at 05:07

## 2024-12-01 RX ADMIN — CEFEPIME 2000 MILLIGRAM(S): 2 INJECTION, POWDER, FOR SOLUTION INTRAVENOUS at 13:53

## 2024-12-01 NOTE — DIETITIAN INITIAL EVALUATION ADULT - PERTINENT LABORATORY DATA
12-01    133[L]  |  99  |  16.5  ----------------------------<  107[H]  4.6   |  26.0  |  0.64    Ca    9.2      01 Dec 2024 06:41  Phos  3.6     11-30  Mg     1.7     12-01    TPro  6.0[L]  /  Alb  3.3  /  TBili  0.4  /  DBili  x   /  AST  13  /  ALT  11  /  AlkPhos  75  12-01  A1C with Estimated Average Glucose Result: 6.4 % (09-27-24 @ 12:58)  A1C with Estimated Average Glucose Result: 5.8 % (08-21-24 @ 04:00)  A1C with Estimated Average Glucose Result: 5.3 % (08-05-24 @ 17:10)

## 2024-12-01 NOTE — DIETITIAN INITIAL EVALUATION ADULT - PERTINENT MEDS FT
MEDICATIONS  (STANDING):  albuterol/ipratropium for Nebulization 3 milliLiter(s) Nebulizer every 6 hours  carvedilol 6.25 milliGRAM(s) Oral every 12 hours  cefepime  Injectable. 2000 milliGRAM(s) IV Push every 8 hours  furosemide   Injectable 20 milliGRAM(s) IV Push once  losartan 12.5 milliGRAM(s) Oral daily  nicotine -  14 mG/24Hr(s) Patch 1 Patch Transdermal daily  spironolactone 12.5 milliGRAM(s) Oral daily    MEDICATIONS  (PRN):  acetaminophen     Tablet .. 650 milliGRAM(s) Oral every 6 hours PRN Temp greater or equal to 38C (100.4F), Mild Pain (1 - 3)  aluminum hydroxide/magnesium hydroxide/simethicone Suspension 30 milliLiter(s) Oral every 4 hours PRN Dyspepsia  guaiFENesin Oral Liquid (Sugar-Free) 200 milliGRAM(s) Oral every 6 hours PRN Cough  melatonin 3 milliGRAM(s) Oral at bedtime PRN Insomnia  ondansetron Injectable 4 milliGRAM(s) IV Push every 8 hours PRN Nausea and/or Vomiting  traMADol 50 milliGRAM(s) Oral every 6 hours PRN Severe Pain (7 - 10)

## 2024-12-01 NOTE — DIETITIAN INITIAL EVALUATION ADULT - ORAL INTAKE PTA/DIET HISTORY
Pt reports decreased appetite/PO intake over past year. Pt endorses weight loss of ~15lbs in past year. Pt denies difficulty chewing swallowing.   Pt follows a Regular diet and drinks Ensure shake "once in a while", finds them very sweet but will drink them occasionally. Pt is currently tolerating Regular   diet well with Fair PO intake. HBV protein foods encouraged. NFPE conducted.

## 2024-12-01 NOTE — PROGRESS NOTE ADULT - ASSESSMENT
70 y/o F PMH of Stage BARBARA ovarian CA w/ carcinomatosis (currently on Carboplatin AUC 5 / Paclitaxel 175mg/m2 s/p C5 on 11/18/24; follows erika/ chintan), HFrEF (EF 25%), active tobacco use presented to ED c/o sob w/ associated nonproductive cough and congestion x 2-3 days and reported being around her grandson who has URI symptoms.  VS stable and WNL while in ED.  Clinically toxic appearing w/ audible nasal congestion but clear to auscultation on exam.  Inital w/u significant for pancytopenia w/ wBC 1.72, Hb 6.3, plts 32. rop neg, [id number], EKG unchanged from prior and w/ no acute ischemic changes.  CTA chest negative for PE but noted to have findings consistent w/ aspiration PNA vs pneumonitis.  Received cefepime 2g, 2u PRBC and 1 duoneb in ED.      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 Martinez 11/11/24 but no plan for interval debulk at this time  - Hemodynamically stable w/ no active bleeding reported   - s/p 2u PRBC in Ed   - Transfuse for Hb<8 or plts <20K in setting of sepsis or <50K if bleeding     Acute on Chronic HFrEF / NICM  - Elevated BNP  - IV Lasix 20mg x 1 now   - Newly diagnosed s/p ECHO/LHC (8/23/24) at which time LVEF was 25-30%, mod MR, trace AR  - Trop negative and EKG w/ nonspecific ST changes but unchanged and no acute ischemic changes   - CT chest w/ no pulm edema/effusions  - c/w on Spironolactone, Losartan, Coreg   - Monitor daily weights, strict I/o's   - Maintain k>4 and Mg>2      Active tobacco use  - Counseled on abstaining from smoking   - Nicotine patch offered         VTE ppx: SCDs given plts <50K    Dispo: Acute.  Anticipate discharge in 24 hours pending clinical improvement

## 2024-12-01 NOTE — PROGRESS NOTE ADULT - SUBJECTIVE AND OBJECTIVE BOX
CC: Follow up     INTERVAL HPI/OVERNIGHT EVENTS: Patient seen and examined, wheezing slightly improved. Complaints of orthopnea when laying flat. Denies chest pain or pressure       Vital Signs Last 24 Hrs  T(C): 36.8 (01 Dec 2024 08:23), Max: 36.8 (01 Dec 2024 08:23)  T(F): 98.2 (01 Dec 2024 08:23), Max: 98.2 (01 Dec 2024 08:23)  HR: 80 (01 Dec 2024 08:57) (70 - 91)  BP: 95/57 (01 Dec 2024 08:23) (95/57 - 137/66)  BP(mean): --  RR: 18 (01 Dec 2024 08:23) (18 - 18)  SpO2: 94% (01 Dec 2024 08:57) (93% - 98%)    Parameters below as of 01 Dec 2024 08:57  Patient On (Oxygen Delivery Method): room air        PHYSICAL EXAM:    GENERAL: NAD,AOX3  CHEST/LUNG: Expiratory wheezing with rhonchi bilaterally   HEART: Regular rate and rhythm; No murmurs, rubs, or gallops  ABDOMEN: Soft, Nontender, Nondistended; Bowel sounds present  EXTREMITIES:  2+ Peripheral Pulses, No clubbing, cyanosis, or edema        MEDICATIONS  (STANDING):  albuterol/ipratropium for Nebulization 3 milliLiter(s) Nebulizer every 6 hours  carvedilol 6.25 milliGRAM(s) Oral every 12 hours  cefepime  Injectable.      cefepime  Injectable. 2000 milliGRAM(s) IV Push every 8 hours  furosemide   Injectable 20 milliGRAM(s) IV Push once  losartan 12.5 milliGRAM(s) Oral daily  nicotine -  14 mG/24Hr(s) Patch 1 Patch Transdermal daily  spironolactone 12.5 milliGRAM(s) Oral daily    MEDICATIONS  (PRN):  acetaminophen     Tablet .. 650 milliGRAM(s) Oral every 6 hours PRN Temp greater or equal to 38C (100.4F), Mild Pain (1 - 3)  aluminum hydroxide/magnesium hydroxide/simethicone Suspension 30 milliLiter(s) Oral every 4 hours PRN Dyspepsia  guaiFENesin Oral Liquid (Sugar-Free) 200 milliGRAM(s) Oral every 6 hours PRN Cough  melatonin 3 milliGRAM(s) Oral at bedtime PRN Insomnia  ondansetron Injectable 4 milliGRAM(s) IV Push every 8 hours PRN Nausea and/or Vomiting  traMADol 50 milliGRAM(s) Oral every 6 hours PRN Severe Pain (7 - 10)      Allergies    No Known Allergies    Intolerances          LABS:                          9.8    2.22  )-----------( 25       ( 01 Dec 2024 06:41 )             29.6     12-01    133[L]  |  99  |  16.5  ----------------------------<  107[H]  4.6   |  26.0  |  0.64    Ca    9.2      01 Dec 2024 06:41  Phos  3.6     11-30  Mg     1.7     12-01    TPro  6.0[L]  /  Alb  3.3  /  TBili  0.4  /  DBili  x   /  AST  13  /  ALT  11  /  AlkPhos  75  12-01      Urinalysis Basic - ( 01 Dec 2024 06:41 )    Color: x / Appearance: x / SG: x / pH: x  Gluc: 107 mg/dL / Ketone: x  / Bili: x / Urobili: x   Blood: x / Protein: x / Nitrite: x   Leuk Esterase: x / RBC: x / WBC x   Sq Epi: x / Non Sq Epi: x / Bacteria: x        RADIOLOGY & ADDITIONAL TESTS:

## 2024-12-01 NOTE — DIETITIAN INITIAL EVALUATION ADULT - OTHER INFO
Pt is a 68 y/o F PMH of Stage BARBARA ovarian CA w/ carcinomatosis (currently on Carboplatin AUC 5 / Paclitaxel 175mg/m2 s/p C5 on 11/18/24; follows w/ chintan), HFrEF (EF 25%), active tobacco use presented to ED c/o sob w/ associated nonproductive cough and congestion x 2-3 days and reported being around her grandson who has URI symptoms.  VS stable and WNL while in ED.  Clinically toxic appearing w/ audible nasal congestion but clear to auscultation on exam.  Inital w/u significant for pancytopenia w/ wBC 1.72, Hb 6.3, plts 32. rop neg, [id number], EKG unchanged from prior and w/ no acute ischemic changes.  CTA chest negative for PE but noted to have findings consistent w/ aspiration PNA vs pneumonitis.  Received cefepime 2g, 2u PRBC and 1 duoneb in ED.

## 2024-12-01 NOTE — DIETITIAN INITIAL EVALUATION ADULT - ETIOLOGY
Related to inadequate protein energy intake with increased needs in setting of Stage BARBARA ovarian CA w/ carcinomatosis on Chemo, pancytopenia

## 2024-12-01 NOTE — PROGRESS NOTE ADULT - NUTRITIONAL ASSESSMENT
This patient has been assessed with a concern for Malnutrition and has been determined to have a diagnosis/diagnoses of Moderate protein-calorie malnutrition and Underweight (BMI < 19).    This patient is being managed with:   Diet Regular-  Entered: Nov 29 2024  7:37AM

## 2024-12-02 ENCOUNTER — TRANSCRIPTION ENCOUNTER (OUTPATIENT)
Age: 69
End: 2024-12-02

## 2024-12-02 LAB
ALBUMIN SERPL ELPH-MCNC: 3.3 G/DL — SIGNIFICANT CHANGE UP (ref 3.3–5.2)
ALP SERPL-CCNC: 85 U/L — SIGNIFICANT CHANGE UP (ref 40–120)
ALT FLD-CCNC: 11 U/L — SIGNIFICANT CHANGE UP
ANION GAP SERPL CALC-SCNC: 14 MMOL/L — SIGNIFICANT CHANGE UP (ref 5–17)
AST SERPL-CCNC: 11 U/L — SIGNIFICANT CHANGE UP
BASOPHILS # BLD AUTO: 0.01 K/UL — SIGNIFICANT CHANGE UP (ref 0–0.2)
BASOPHILS NFR BLD AUTO: 0.3 % — SIGNIFICANT CHANGE UP (ref 0–2)
BILIRUB SERPL-MCNC: 0.3 MG/DL — LOW (ref 0.4–2)
BUN SERPL-MCNC: 21.9 MG/DL — HIGH (ref 8–20)
CALCIUM SERPL-MCNC: 9.1 MG/DL — SIGNIFICANT CHANGE UP (ref 8.4–10.5)
CHLORIDE SERPL-SCNC: 99 MMOL/L — SIGNIFICANT CHANGE UP (ref 96–108)
CO2 SERPL-SCNC: 25 MMOL/L — SIGNIFICANT CHANGE UP (ref 22–29)
CREAT SERPL-MCNC: 0.72 MG/DL — SIGNIFICANT CHANGE UP (ref 0.5–1.3)
EGFR: 89 ML/MIN/1.73M2 — SIGNIFICANT CHANGE UP
EOSINOPHIL # BLD AUTO: 0.03 K/UL — SIGNIFICANT CHANGE UP (ref 0–0.5)
EOSINOPHIL NFR BLD AUTO: 1 % — SIGNIFICANT CHANGE UP (ref 0–6)
GLUCOSE SERPL-MCNC: 117 MG/DL — HIGH (ref 70–99)
HCT VFR BLD CALC: 30 % — LOW (ref 34.5–45)
HGB BLD-MCNC: 9.9 G/DL — LOW (ref 11.5–15.5)
IMM GRANULOCYTES NFR BLD AUTO: 0.3 % — SIGNIFICANT CHANGE UP (ref 0–0.9)
LYMPHOCYTES # BLD AUTO: 1.81 K/UL — SIGNIFICANT CHANGE UP (ref 1–3.3)
LYMPHOCYTES # BLD AUTO: 62 % — HIGH (ref 13–44)
MCHC RBC-ENTMCNC: 31 PG — SIGNIFICANT CHANGE UP (ref 27–34)
MCHC RBC-ENTMCNC: 33 G/DL — SIGNIFICANT CHANGE UP (ref 32–36)
MCV RBC AUTO: 94 FL — SIGNIFICANT CHANGE UP (ref 80–100)
MONOCYTES # BLD AUTO: 0.7 K/UL — SIGNIFICANT CHANGE UP (ref 0–0.9)
MONOCYTES NFR BLD AUTO: 24 % — HIGH (ref 2–14)
NEUTROPHILS # BLD AUTO: 0.36 K/UL — LOW (ref 1.8–7.4)
NEUTROPHILS NFR BLD AUTO: 12.4 % — LOW (ref 43–77)
PLATELET # BLD AUTO: 24 K/UL — LOW (ref 150–400)
POTASSIUM SERPL-MCNC: 4.8 MMOL/L — SIGNIFICANT CHANGE UP (ref 3.5–5.3)
POTASSIUM SERPL-SCNC: 4.8 MMOL/L — SIGNIFICANT CHANGE UP (ref 3.5–5.3)
PROT SERPL-MCNC: 6.3 G/DL — LOW (ref 6.6–8.7)
RBC # BLD: 3.19 M/UL — LOW (ref 3.8–5.2)
RBC # FLD: 21.9 % — HIGH (ref 10.3–14.5)
SODIUM SERPL-SCNC: 138 MMOL/L — SIGNIFICANT CHANGE UP (ref 135–145)
WBC # BLD: 2.92 K/UL — LOW (ref 3.8–10.5)
WBC # FLD AUTO: 2.92 K/UL — LOW (ref 3.8–10.5)

## 2024-12-02 PROCEDURE — 99239 HOSP IP/OBS DSCHRG MGMT >30: CPT

## 2024-12-02 PROCEDURE — 83735 ASSAY OF MAGNESIUM: CPT

## 2024-12-02 PROCEDURE — 85610 PROTHROMBIN TIME: CPT

## 2024-12-02 PROCEDURE — 87640 STAPH A DNA AMP PROBE: CPT

## 2024-12-02 PROCEDURE — 97163 PT EVAL HIGH COMPLEX 45 MIN: CPT

## 2024-12-02 PROCEDURE — 99285 EMERGENCY DEPT VISIT HI MDM: CPT | Mod: 25

## 2024-12-02 PROCEDURE — 80053 COMPREHEN METABOLIC PANEL: CPT

## 2024-12-02 PROCEDURE — 83605 ASSAY OF LACTIC ACID: CPT

## 2024-12-02 PROCEDURE — 81001 URINALYSIS AUTO W/SCOPE: CPT

## 2024-12-02 PROCEDURE — 93005 ELECTROCARDIOGRAM TRACING: CPT

## 2024-12-02 PROCEDURE — 83880 ASSAY OF NATRIURETIC PEPTIDE: CPT

## 2024-12-02 PROCEDURE — 87641 MR-STAPH DNA AMP PROBE: CPT

## 2024-12-02 PROCEDURE — 82947 ASSAY GLUCOSE BLOOD QUANT: CPT

## 2024-12-02 PROCEDURE — 84484 ASSAY OF TROPONIN QUANT: CPT

## 2024-12-02 PROCEDURE — 36415 COLL VENOUS BLD VENIPUNCTURE: CPT

## 2024-12-02 PROCEDURE — 87637 SARSCOV2&INF A&B&RSV AMP PRB: CPT

## 2024-12-02 PROCEDURE — 71045 X-RAY EXAM CHEST 1 VIEW: CPT | Mod: 26

## 2024-12-02 PROCEDURE — 85014 HEMATOCRIT: CPT

## 2024-12-02 PROCEDURE — 71045 X-RAY EXAM CHEST 1 VIEW: CPT

## 2024-12-02 PROCEDURE — 94640 AIRWAY INHALATION TREATMENT: CPT

## 2024-12-02 PROCEDURE — 82435 ASSAY OF BLOOD CHLORIDE: CPT

## 2024-12-02 PROCEDURE — 86901 BLOOD TYPING SEROLOGIC RH(D): CPT

## 2024-12-02 PROCEDURE — 87040 BLOOD CULTURE FOR BACTERIA: CPT

## 2024-12-02 PROCEDURE — 86923 COMPATIBILITY TEST ELECTRIC: CPT

## 2024-12-02 PROCEDURE — 84295 ASSAY OF SERUM SODIUM: CPT

## 2024-12-02 PROCEDURE — 85025 COMPLETE CBC W/AUTO DIFF WBC: CPT

## 2024-12-02 PROCEDURE — 71275 CT ANGIOGRAPHY CHEST: CPT | Mod: MC

## 2024-12-02 PROCEDURE — 82803 BLOOD GASES ANY COMBINATION: CPT

## 2024-12-02 PROCEDURE — 86900 BLOOD TYPING SEROLOGIC ABO: CPT

## 2024-12-02 PROCEDURE — 85018 HEMOGLOBIN: CPT

## 2024-12-02 PROCEDURE — 85730 THROMBOPLASTIN TIME PARTIAL: CPT

## 2024-12-02 PROCEDURE — 36430 TRANSFUSION BLD/BLD COMPNT: CPT

## 2024-12-02 PROCEDURE — P9016: CPT

## 2024-12-02 PROCEDURE — 82330 ASSAY OF CALCIUM: CPT

## 2024-12-02 PROCEDURE — 86850 RBC ANTIBODY SCREEN: CPT

## 2024-12-02 PROCEDURE — 84100 ASSAY OF PHOSPHORUS: CPT

## 2024-12-02 PROCEDURE — 96374 THER/PROPH/DIAG INJ IV PUSH: CPT

## 2024-12-02 PROCEDURE — 84132 ASSAY OF SERUM POTASSIUM: CPT

## 2024-12-02 RX ORDER — SPIRONOLACTONE 25 MG
0.5 TABLET ORAL
Qty: 0 | Refills: 0 | DISCHARGE
Start: 2024-12-02

## 2024-12-02 RX ORDER — AMOXICILLIN/POTASSIUM CLAV 250-125 MG
1 TABLET ORAL
Qty: 14 | Refills: 0
Start: 2024-12-02 | End: 2024-12-08

## 2024-12-02 RX ORDER — CARVEDILOL 25 MG/1
1 TABLET, FILM COATED ORAL
Qty: 0 | Refills: 0 | DISCHARGE
Start: 2024-12-02

## 2024-12-02 RX ORDER — ACETAMINOPHEN 500MG 500 MG/1
2 TABLET, COATED ORAL
Qty: 0 | Refills: 0 | DISCHARGE
Start: 2024-12-02

## 2024-12-02 RX ORDER — TRAMADOL HYDROCHLORIDE 300 MG/1
1 CAPSULE ORAL
Qty: 0 | Refills: 0 | DISCHARGE
Start: 2024-12-02

## 2024-12-02 RX ORDER — IPRATROPIUM BROMIDE AND ALBUTEROL SULFATE 2.5; .5 MG/3ML; MG/3ML
3 SOLUTION RESPIRATORY (INHALATION)
Qty: 0 | Refills: 0 | DISCHARGE
Start: 2024-12-02

## 2024-12-02 RX ORDER — IPRATROPIUM BROMIDE AND ALBUTEROL SULFATE 2.5; .5 MG/3ML; MG/3ML
3 SOLUTION RESPIRATORY (INHALATION)
Qty: 168 | Refills: 0
Start: 2024-12-02 | End: 2024-12-15

## 2024-12-02 RX ADMIN — CARVEDILOL 6.25 MILLIGRAM(S): 25 TABLET, FILM COATED ORAL at 06:16

## 2024-12-02 RX ADMIN — NICOTINE 1 PATCH: 21 PATCH, EXTENDED RELEASE TRANSDERMAL at 14:00

## 2024-12-02 RX ADMIN — CEFEPIME 2000 MILLIGRAM(S): 2 INJECTION, POWDER, FOR SOLUTION INTRAVENOUS at 06:17

## 2024-12-02 RX ADMIN — NICOTINE 1 PATCH: 21 PATCH, EXTENDED RELEASE TRANSDERMAL at 13:55

## 2024-12-02 RX ADMIN — ACETAMINOPHEN, DIPHENHYDRAMINE HCL, PHENYLEPHRINE HCL 3 MILLIGRAM(S): 325; 25; 5 TABLET ORAL at 00:29

## 2024-12-02 RX ADMIN — Medication 12.5 MILLIGRAM(S): at 06:16

## 2024-12-02 RX ADMIN — IPRATROPIUM BROMIDE AND ALBUTEROL SULFATE 3 MILLILITER(S): 2.5; .5 SOLUTION RESPIRATORY (INHALATION) at 09:00

## 2024-12-02 RX ADMIN — LOSARTAN POTASSIUM 12.5 MILLIGRAM(S): 100 TABLET, FILM COATED ORAL at 07:12

## 2024-12-02 RX ADMIN — NICOTINE 1 PATCH: 21 PATCH, EXTENDED RELEASE TRANSDERMAL at 07:48

## 2024-12-02 RX ADMIN — CEFEPIME 2000 MILLIGRAM(S): 2 INJECTION, POWDER, FOR SOLUTION INTRAVENOUS at 13:07

## 2024-12-02 RX ADMIN — IPRATROPIUM BROMIDE AND ALBUTEROL SULFATE 3 MILLILITER(S): 2.5; .5 SOLUTION RESPIRATORY (INHALATION) at 15:53

## 2024-12-02 NOTE — DISCHARGE NOTE PROVIDER - CARE PROVIDER_API CALL
Paradise Coles [facility] On[facility]  [address] [place] [id number]  Phone: [phone]  Fax: [phone]  Follow Up Time:

## 2024-12-02 NOTE — DISCHARGE NOTE PROVIDER - NSDCCPCAREPLAN_GEN_ALL_CORE_FT
PRINCIPAL DISCHARGE DIAGNOSIS  Diagnosis: Pancytopenia due to chemotherapy  Assessment and Plan of Treatment: Please follow up with your Hematologist      SECONDARY DISCHARGE DIAGNOSES  Diagnosis: Pneumonia  Assessment and Plan of Treatment: Completed course of antibiotics.  Please follow up with your Primary Care Provider    Diagnosis: Chronic CHF  Assessment and Plan of Treatment: Please take medications as prescribed and follow up with your Primary Care or Provider and Cardiologist.     PRINCIPAL DISCHARGE DIAGNOSIS  Diagnosis: Pancytopenia due to chemotherapy  Assessment and Plan of Treatment: Please follow up with your Hematologist      SECONDARY DISCHARGE DIAGNOSES  Diagnosis: Pneumonia  Assessment and Plan of Treatment: Complete course of antibiotics as prescribed  Please follow up with your Primary Care Provider    Diagnosis: Chronic CHF  Assessment and Plan of Treatment: Please take medications as prescribed and follow up with your Primary Care or Provider and Cardiologist.

## 2024-12-02 NOTE — DISCHARGE NOTE NURSING/CASE MANAGEMENT/SOCIAL WORK - NSDCPEFALRISK_GEN_ALL_CORE
For information on Fall & Injury Prevention, visit: [email or web address] OR  [email or web address] OR  https://www.cdc.gov/steadi/patient.html

## 2024-12-02 NOTE — DISCHARGE NOTE PROVIDER - ATTENDING DISCHARGE PHYSICAL EXAMINATION:
INTERVAL HPI/OVERNIGHT EVENTS: Patient seen and examined, overall feels better. SOB, cough wheezing improving. Afebrile.       Vital Signs Last 24 Hrs  T(C): 36.5 (02 Dec 2024 11:28), Max: 36.8 (01 Dec 2024 16:41)  T(F): 97.7 (02 Dec 2024 11:28), Max: 98.3 (01 Dec 2024 16:41)  HR: 81 (02 Dec 2024 11:28) (79 - 91)  BP: 123/68 (02 Dec 2024 11:28) (104/62 - 123/68)  BP(mean): --  RR: 18 (02 Dec 2024 11:28) (18 - 18)  SpO2: 98% (02 Dec 2024 11:28) (94% - 98%)    Parameters below as of 02 Dec 2024 11:28  Patient On (Oxygen Delivery Method): room air        PHYSICAL EXAM:    GENERAL: NAD, AOX4  HEAD:  Atraumatic, Normocephalic  EYES: onjunctiva and sclera clear  CHEST/LUNG: Clear to auscultation bilaterally; No rales, rhonchi, wheezing, or rubs  HEART: Regular rate and rhythm; No murmurs, rubs, or gallops  ABDOMEN: Soft, Nontender, Nondistended; Bowel sounds present  EXTREMITIES:  2+ Peripheral Pulses, No clubbing, cyanosis, or edema

## 2024-12-02 NOTE — DISCHARGE NOTE PROVIDER - DETAILS OF MALNUTRITION DIAGNOSIS/DIAGNOSES
This patient has been assessed with a concern for Malnutrition and was treated during this hospitalization for the following Nutrition diagnosis/diagnoses:     -  12/01/2024: Moderate protein-calorie malnutrition   -  12/01/2024: Underweight (BMI < 19)

## 2024-12-02 NOTE — DISCHARGE NOTE PROVIDER - NSDCFUSCHEDAPPT_GEN_ALL_CORE_FT
[facility]  GY[facility] [address]  Scheduled Appointment: 12/03/2024    [facility]  [facility]  Scheduled Appointment: 12/03/2024    [facility]  [facility]  Scheduled Appointment: 12/09/2024    [facility]  CARDIOLOGY [address]  Scheduled Appointment: 12/16/2024    Gildardo Dinh  [facility]  CARDIOLOGY [address]  Scheduled Appointment: 12/18/2024    [facility]  [facility]  Scheduled Appointment: 12/26/2024    [facility]  [facility]  Scheduled Appointment: 12/30/2024    Frantz Martinez  [facility]  GY[facility] [address]  Scheduled Appointment: 01/06/2025    Gildardo Dinh  [facility]  CARDIOLOGY [address]  Scheduled Appointment: 02/12/2025    Gildardo Dinh  [facility]  CARDIOLOGY [address]  Scheduled Appointment: 02/25/2025

## 2024-12-02 NOTE — DISCHARGE NOTE NURSING/CASE MANAGEMENT/SOCIAL WORK - FINANCIAL ASSISTANCE
[facility] provides services at a reduced cost to those who are determined to be eligible through [facility]’s financial assistance program. Information regarding [facility]’s financial assistance program can be found by going to [email or web address] or by calling [phone].

## 2024-12-02 NOTE — DISCHARGE NOTE PROVIDER - NSDCMRMEDTOKEN_GEN_ALL_CORE_FT
acetaminophen 325 mg oral tablet: 2 tab(s) orally every 6 hours As needed Temp greater or equal to 38C (100.4F), Mild Pain (1 - 3)  albuterol 90 mcg/inh inhalation aerosol: 2 puff(s) inhaled every 6 hours as needed for  shortness of breath and/or wheezing  carvedilol 6.25 mg oral tablet: 1 tab(s) orally every 12 hours  Coreg 6.25 mg oral tablet: 1 tab(s) orally every 12 hours  ipratropium-albuterol 0.5 mg-2.5 mg/3 mL inhalation solution: 3 milliliter(s) inhaled every 6 hours  losartan 25 mg oral tablet: 0.5 tab(s) orally once a day  spironolactone 25 mg oral tablet: 0.5 tab(s) orally once a day  spironolactone 25 mg oral tablet: 0.5 tab(s) orally once a day  traMADol 50 mg oral tablet: 1 tab(s) orally every 6 hours As needed Severe Pain (7 - 10)  Tylenol 325 mg oral tablet: 2 tab(s) orally every 6 hours as needed for  pain   acetaminophen 325 mg oral tablet: 2 tab(s) orally every 6 hours As needed Temp greater or equal to 38C (100.4F), Mild Pain (1 - 3)  albuterol 90 mcg/inh inhalation aerosol: 2 puff(s) inhaled every 6 hours as needed for  shortness of breath and/or wheezing  amoxicillin-clavulanate 875 mg-125 mg oral tablet: 1 tab(s) orally 2 times a day  Coreg 6.25 mg oral tablet: 1 tab(s) orally every 12 hours  ipratropium-albuterol 0.5 mg-2.5 mg/3 mL inhalation solution: 3 milliliter(s) inhaled every 6 hours  losartan 25 mg oral tablet: 0.5 tab(s) orally once a day  Nebulizer machine: Duoneb Every 6 hours as needed for sob or wheezing   ICD 10 J45.909  spironolactone 25 mg oral tablet: 0.5 tab(s) orally once a day

## 2024-12-02 NOTE — DISCHARGE NOTE PROVIDER - HOSPITAL COURSE
68 y/o F PMH of Stage BARBARA ovarian CA w/ carcinomatosis (currently on Carboplatin AUC 5 / Paclitaxel 175mg/m2 s/p C5 on 11/18/24; follows w/ chintan), HFrEF (EF 25%), active tobacco use presented to ED c/o sob w/ associated nonproductive cough and congestion x 2-3 days and reported being around her grandson who has URI symptoms.  VS stable and WNL while in ED.  Clinically toxic appearing w/ audible nasal congestion but clear to auscultation on exam.  Initial w/u significant for pancytopenia w/ WBC 1.72, Hb 6.3, plts 32. Rvp neg, [id number], EKG unchanged from prior and w/ no acute ischemic changes.  CTA chest negative for PE but noted to have findings consistent w/ aspiration PNA vs pneumonitis.  Received cefepime 2g, 2u PRBC and 1 duoneb in ED. Hemoglobin 9.8 and stable post transfusion. Completed course of cefepime and clinically improving.  Hematology consulted and recommend transfusion for Hb < 8 or plts < 20k in setting of sepsis or <50K if bleeding. No signs of active bleeding. Newly diagnosed with HFrEF s/p echo/ LHC on 8/23/24.  Currenly trop negative and EKG with nonspecific ST changes, unchanged from previous. BNP elevated. IV lasix 20 mg given. CT chest with no pulmonary edema/ effusions. Continue on spirnolactone/ losartan/ Coreg. Stable for discharge.                            70 y/o F PMH of Stage BARBARA ovarian CA w/ carcinomatosis (currently on Carboplatin AUC 5 / Paclitaxel 175mg/m2 s/p C5 on 11/18/24; follows w/ chintan), HFrEF (EF 25%), active tobacco use presented to ED c/o sob w/ associated nonproductive cough and congestion x 2-3 days and reported being around her grandson who has URI symptoms.  VS stable and WNL while in ED.  Clinically toxic appearing w/ audible nasal congestion but clear to auscultation on exam.  Initial w/u significant for pancytopenia w/ WBC 1.72, Hb 6.3, plts 32. Rvp neg, [id number], EKG unchanged from prior and w/ no acute ischemic changes.  CTA chest negative for PE but noted to have findings consistent w/ aspiration PNA vs pneumonitis.  Received cefepime 2g, 2u PRBC and 1 duoneb in ED. Hemoglobin 9.8 and stable post transfusion. Completed course of cefepime and clinically improving.  Hematology consulted and recommend transfusion for Hb < 8 or plts < 20k in setting of sepsis or <50K if bleeding. No signs of active bleeding. Newly diagnosed with HFrEF s/p echo/ LHC on 8/23/24.  Currenly trop negative and EKG with nonspecific ST changes, unchanged from previous. BNP elevated. IV lasix 20 mg given. CT chest with no pulmonary edema/ effusions. Continue on spirnolactone/ losartan/ Coreg. Stable for discharge.

## 2024-12-02 NOTE — DISCHARGE NOTE PROVIDER - CARE PROVIDERS DIRECT ADDRESSES
[email or web address]

## 2024-12-02 NOTE — DISCHARGE NOTE NURSING/CASE MANAGEMENT/SOCIAL WORK - PATIENT PORTAL LINK FT
You can access the FollowMyHealth Patient Portal offered by [facility] by registering at the following website: [email or web address]. By joining [employer]’s FollowMyHealth portal, you will also be able to view your health information using other applications (apps) compatible with our system.

## 2024-12-03 ENCOUNTER — RESULT REVIEW (OUTPATIENT)
Age: 69
End: 2024-12-03

## 2024-12-03 ENCOUNTER — APPOINTMENT (OUTPATIENT)
Dept: HEMATOLOGY ONCOLOGY | Facility: CLINIC | Age: 69
End: 2024-12-03

## 2024-12-03 ENCOUNTER — APPOINTMENT (OUTPATIENT)
Dept: GYNECOLOGIC ONCOLOGY | Facility: CLINIC | Age: 69
End: 2024-12-03
Payer: MEDICARE

## 2024-12-03 VITALS
HEART RATE: 72 BPM | SYSTOLIC BLOOD PRESSURE: 93 MMHG | OXYGEN SATURATION: 97 % | DIASTOLIC BLOOD PRESSURE: 51 MMHG | TEMPERATURE: 98 F | RESPIRATION RATE: 18 BRPM

## 2024-12-03 VITALS
HEIGHT: 62 IN | OXYGEN SATURATION: 100 % | SYSTOLIC BLOOD PRESSURE: 133 MMHG | WEIGHT: 106 LBS | HEART RATE: 103 BPM | DIASTOLIC BLOOD PRESSURE: 75 MMHG | BODY MASS INDEX: 19.51 KG/M2 | TEMPERATURE: 97.4 F

## 2024-12-03 PROCEDURE — G2211 COMPLEX E/M VISIT ADD ON: CPT

## 2024-12-03 PROCEDURE — 99214 OFFICE O/P EST MOD 30 MIN: CPT

## 2024-12-04 LAB
ALBUMIN SERPL ELPH-MCNC: 4.3 G/DL
ALP BLD-CCNC: 92 U/L
ALT SERPL-CCNC: 10 U/L
ANION GAP SERPL CALC-SCNC: 11 MMOL/L
AST SERPL-CCNC: 15 U/L
BILIRUB SERPL-MCNC: 0.4 MG/DL
BUN SERPL-MCNC: 17 MG/DL
CALCIUM SERPL-MCNC: 9.6 MG/DL
CANCER AG125 SERPL-ACNC: 57 U/ML
CHLORIDE SERPL-SCNC: 96 MMOL/L
CO2 SERPL-SCNC: 26 MMOL/L
CREAT SERPL-MCNC: 0.62 MG/DL
CULTURE RESULTS: SIGNIFICANT CHANGE UP
CULTURE RESULTS: SIGNIFICANT CHANGE UP
EGFR: 95 ML/MIN/1.73M2
GLUCOSE SERPL-MCNC: 91 MG/DL
MAGNESIUM SERPL-MCNC: 1.5 MG/DL
POTASSIUM SERPL-SCNC: 4.9 MMOL/L
PROT SERPL-MCNC: 6.9 G/DL
SODIUM SERPL-SCNC: 133 MMOL/L
SPECIMEN SOURCE: SIGNIFICANT CHANGE UP
SPECIMEN SOURCE: SIGNIFICANT CHANGE UP

## 2024-12-05 ENCOUNTER — NON-APPOINTMENT (OUTPATIENT)
Age: 69
End: 2024-12-05

## 2024-12-05 ENCOUNTER — TRANSCRIPTION ENCOUNTER (OUTPATIENT)
Age: 69
End: 2024-12-05

## 2024-12-06 ENCOUNTER — TRANSCRIPTION ENCOUNTER (OUTPATIENT)
Age: 69
End: 2024-12-06

## 2024-12-09 ENCOUNTER — RESULT REVIEW (OUTPATIENT)
Age: 69
End: 2024-12-09

## 2024-12-09 ENCOUNTER — APPOINTMENT (OUTPATIENT)
Age: 69
End: 2024-12-09

## 2024-12-09 RX ORDER — ALPRAZOLAM 0.25 MG/1
0.25 TABLET ORAL
Qty: 3 | Refills: 0 | Status: ACTIVE | COMMUNITY
Start: 2024-12-09 | End: 1900-01-01

## 2024-12-10 ENCOUNTER — APPOINTMENT (OUTPATIENT)
Dept: INTERNAL MEDICINE | Facility: CLINIC | Age: 69
End: 2024-12-10
Payer: MEDICARE

## 2024-12-10 VITALS — DIASTOLIC BLOOD PRESSURE: 60 MMHG | SYSTOLIC BLOOD PRESSURE: 105 MMHG

## 2024-12-10 DIAGNOSIS — J18.1 LOBAR PNEUMONIA, UNSPECIFIED ORGANISM: ICD-10-CM

## 2024-12-10 DIAGNOSIS — J15.9 UNSPECIFIED BACTERIAL PNEUMONIA: ICD-10-CM

## 2024-12-10 DIAGNOSIS — Z09 ENCOUNTER FOR FOLLOW-UP EXAMINATION AFTER COMPLETED TREATMENT FOR CONDITIONS OTHER THAN MALIGNANT NEOPLASM: ICD-10-CM

## 2024-12-10 PROBLEM — F41.9 ANXIETY: Status: ACTIVE | Noted: 2024-12-10

## 2024-12-10 PROCEDURE — 99496 TRANSJ CARE MGMT HIGH F2F 7D: CPT | Mod: GC

## 2024-12-10 RX ORDER — ALPRAZOLAM 0.25 MG/1
0.25 TABLET ORAL
Qty: 30 | Refills: 1 | Status: ACTIVE | COMMUNITY
Start: 2024-12-10 | End: 1900-01-01

## 2024-12-13 ENCOUNTER — TRANSCRIPTION ENCOUNTER (OUTPATIENT)
Age: 69
End: 2024-12-13

## 2024-12-16 ENCOUNTER — APPOINTMENT (OUTPATIENT)
Dept: CARDIOLOGY | Facility: CLINIC | Age: 69
End: 2024-12-16
Payer: MEDICARE

## 2024-12-16 ENCOUNTER — APPOINTMENT (OUTPATIENT)
Dept: CARDIOLOGY | Facility: CLINIC | Age: 69
End: 2024-12-16

## 2024-12-16 PROCEDURE — 93306 TTE W/DOPPLER COMPLETE: CPT

## 2024-12-18 ENCOUNTER — APPOINTMENT (OUTPATIENT)
Dept: CARDIOLOGY | Facility: CLINIC | Age: 69
End: 2024-12-18
Payer: MEDICARE

## 2024-12-18 VITALS
BODY MASS INDEX: 19.51 KG/M2 | SYSTOLIC BLOOD PRESSURE: 110 MMHG | HEIGHT: 62 IN | OXYGEN SATURATION: 100 % | DIASTOLIC BLOOD PRESSURE: 62 MMHG | WEIGHT: 106 LBS | HEART RATE: 104 BPM

## 2024-12-18 DIAGNOSIS — F41.9 ANXIETY DISORDER, UNSPECIFIED: ICD-10-CM

## 2024-12-18 DIAGNOSIS — F17.210 NICOTINE DEPENDENCE, CIGARETTES, UNCOMPLICATED: ICD-10-CM

## 2024-12-18 DIAGNOSIS — I10 ESSENTIAL (PRIMARY) HYPERTENSION: ICD-10-CM

## 2024-12-18 DIAGNOSIS — N94.89 OTHER SPECIFIED CONDITIONS ASSOCIATED WITH FEMALE GENITAL ORGANS AND MENSTRUAL CYCLE: ICD-10-CM

## 2024-12-18 DIAGNOSIS — R94.31 ABNORMAL ELECTROCARDIOGRAM [ECG] [EKG]: ICD-10-CM

## 2024-12-18 DIAGNOSIS — I50.20 UNSPECIFIED SYSTOLIC (CONGESTIVE) HEART FAILURE: ICD-10-CM

## 2024-12-18 PROCEDURE — 99213 OFFICE O/P EST LOW 20 MIN: CPT

## 2024-12-26 ENCOUNTER — APPOINTMENT (OUTPATIENT)
Dept: HEMATOLOGY ONCOLOGY | Facility: CLINIC | Age: 69
End: 2024-12-26

## 2024-12-28 ENCOUNTER — INPATIENT (INPATIENT)
Facility: HOSPITAL | Age: 69
LOS: 0 days | Discharge: HOSPICE | DRG: 291 | End: 2024-12-29
Attending: STUDENT IN AN ORGANIZED HEALTH CARE EDUCATION/TRAINING PROGRAM | Admitting: STUDENT IN AN ORGANIZED HEALTH CARE EDUCATION/TRAINING PROGRAM
Payer: COMMERCIAL

## 2024-12-28 VITALS
SYSTOLIC BLOOD PRESSURE: 181 MMHG | HEART RATE: 133 BPM | HEIGHT: 63 IN | OXYGEN SATURATION: 92 % | WEIGHT: 104.94 LBS | RESPIRATION RATE: 30 BRPM | TEMPERATURE: 97 F | DIASTOLIC BLOOD PRESSURE: 101 MMHG

## 2024-12-28 DIAGNOSIS — I50.23 ACUTE ON CHRONIC SYSTOLIC (CONGESTIVE) HEART FAILURE: ICD-10-CM

## 2024-12-28 DIAGNOSIS — R79.89 OTHER SPECIFIED ABNORMAL FINDINGS OF BLOOD CHEMISTRY: ICD-10-CM

## 2024-12-28 DIAGNOSIS — Z98.890 OTHER SPECIFIED POSTPROCEDURAL STATES: Chronic | ICD-10-CM

## 2024-12-28 LAB
ALBUMIN SERPL ELPH-MCNC: 3.4 G/DL — SIGNIFICANT CHANGE UP (ref 3.3–5.2)
ALP SERPL-CCNC: 70 U/L — SIGNIFICANT CHANGE UP (ref 40–120)
ALT FLD-CCNC: 13 U/L — SIGNIFICANT CHANGE UP
ANION GAP SERPL CALC-SCNC: 8 MMOL/L — SIGNIFICANT CHANGE UP (ref 5–17)
AST SERPL-CCNC: 16 U/L — SIGNIFICANT CHANGE UP
BASOPHILS # BLD AUTO: 0.02 K/UL — SIGNIFICANT CHANGE UP (ref 0–0.2)
BASOPHILS NFR BLD AUTO: 0.2 % — SIGNIFICANT CHANGE UP (ref 0–2)
BILIRUB SERPL-MCNC: 0.3 MG/DL — LOW (ref 0.4–2)
BUN SERPL-MCNC: 10.3 MG/DL — SIGNIFICANT CHANGE UP (ref 8–20)
CALCIUM SERPL-MCNC: 8.8 MG/DL — SIGNIFICANT CHANGE UP (ref 8.4–10.5)
CHLORIDE SERPL-SCNC: 101 MMOL/L — SIGNIFICANT CHANGE UP (ref 96–108)
CO2 SERPL-SCNC: 29 MMOL/L — SIGNIFICANT CHANGE UP (ref 22–29)
CREAT SERPL-MCNC: 0.78 MG/DL — SIGNIFICANT CHANGE UP (ref 0.5–1.3)
EGFR: 81 ML/MIN/1.73M2 — SIGNIFICANT CHANGE UP
EOSINOPHIL # BLD AUTO: 0.01 K/UL — SIGNIFICANT CHANGE UP (ref 0–0.5)
EOSINOPHIL NFR BLD AUTO: 0.1 % — SIGNIFICANT CHANGE UP (ref 0–6)
FLUAV AG NPH QL: SIGNIFICANT CHANGE UP
FLUBV AG NPH QL: SIGNIFICANT CHANGE UP
GLUCOSE SERPL-MCNC: 172 MG/DL — HIGH (ref 70–99)
HCT VFR BLD CALC: 26.3 % — LOW (ref 34.5–45)
HGB BLD-MCNC: 8.8 G/DL — LOW (ref 11.5–15.5)
IMM GRANULOCYTES NFR BLD AUTO: 0.9 % — SIGNIFICANT CHANGE UP (ref 0–0.9)
LYMPHOCYTES # BLD AUTO: 1.28 K/UL — SIGNIFICANT CHANGE UP (ref 1–3.3)
LYMPHOCYTES # BLD AUTO: 15 % — SIGNIFICANT CHANGE UP (ref 13–44)
MCHC RBC-ENTMCNC: 32.8 PG — SIGNIFICANT CHANGE UP (ref 27–34)
MCHC RBC-ENTMCNC: 33.5 G/DL — SIGNIFICANT CHANGE UP (ref 32–36)
MCV RBC AUTO: 98.1 FL — SIGNIFICANT CHANGE UP (ref 80–100)
MONOCYTES # BLD AUTO: 0.96 K/UL — HIGH (ref 0–0.9)
MONOCYTES NFR BLD AUTO: 11.2 % — SIGNIFICANT CHANGE UP (ref 2–14)
NEUTROPHILS # BLD AUTO: 6.2 K/UL — SIGNIFICANT CHANGE UP (ref 1.8–7.4)
NEUTROPHILS NFR BLD AUTO: 72.6 % — SIGNIFICANT CHANGE UP (ref 43–77)
NT-PROBNP SERPL-SCNC: 2131 PG/ML — HIGH (ref 0–300)
PLATELET # BLD AUTO: 97 K/UL — LOW (ref 150–400)
POTASSIUM SERPL-MCNC: 4.1 MMOL/L — SIGNIFICANT CHANGE UP (ref 3.5–5.3)
POTASSIUM SERPL-SCNC: 4.1 MMOL/L — SIGNIFICANT CHANGE UP (ref 3.5–5.3)
PROT SERPL-MCNC: 6 G/DL — LOW (ref 6.6–8.7)
RBC # BLD: 2.68 M/UL — LOW (ref 3.8–5.2)
RBC # FLD: 21.6 % — HIGH (ref 10.3–14.5)
RSV RNA NPH QL NAA+NON-PROBE: SIGNIFICANT CHANGE UP
SARS-COV-2 RNA SPEC QL NAA+PROBE: SIGNIFICANT CHANGE UP
SODIUM SERPL-SCNC: 138 MMOL/L — SIGNIFICANT CHANGE UP (ref 135–145)
TROPONIN T, HIGH SENSITIVITY RESULT: 103 NG/L — HIGH (ref 0–51)
TROPONIN T, HIGH SENSITIVITY RESULT: 110 NG/L — HIGH (ref 0–51)
WBC # BLD: 8.55 K/UL — SIGNIFICANT CHANGE UP (ref 3.8–10.5)
WBC # FLD AUTO: 8.55 K/UL — SIGNIFICANT CHANGE UP (ref 3.8–10.5)

## 2024-12-28 PROCEDURE — 93010 ELECTROCARDIOGRAM REPORT: CPT | Mod: 76

## 2024-12-28 PROCEDURE — 71275 CT ANGIOGRAPHY CHEST: CPT | Mod: 26,MC

## 2024-12-28 PROCEDURE — 99284 EMERGENCY DEPT VISIT MOD MDM: CPT | Mod: GC

## 2024-12-28 PROCEDURE — 99223 1ST HOSP IP/OBS HIGH 75: CPT

## 2024-12-28 PROCEDURE — 99285 EMERGENCY DEPT VISIT HI MDM: CPT

## 2024-12-28 PROCEDURE — 71046 X-RAY EXAM CHEST 2 VIEWS: CPT | Mod: 26

## 2024-12-28 RX ORDER — ASPIRIN 81 MG
324 TABLET, DELAYED RELEASE (ENTERIC COATED) ORAL ONCE
Refills: 0 | Status: COMPLETED | OUTPATIENT
Start: 2024-12-28 | End: 2024-12-28

## 2024-12-28 RX ORDER — ONDANSETRON 4 MG/1
4 TABLET ORAL EVERY 8 HOURS
Refills: 0 | Status: DISCONTINUED | OUTPATIENT
Start: 2024-12-28 | End: 2024-12-29

## 2024-12-28 RX ORDER — FUROSEMIDE 20 MG
40 TABLET ORAL ONCE
Refills: 0 | Status: COMPLETED | OUTPATIENT
Start: 2024-12-28 | End: 2024-12-28

## 2024-12-28 RX ORDER — NICOTINE POLACRILEX 4 MG/1
1 LOZENGE ORAL DAILY
Refills: 0 | Status: DISCONTINUED | OUTPATIENT
Start: 2024-12-28 | End: 2024-12-29

## 2024-12-28 RX ORDER — LOSARTAN POTASSIUM 100 MG/1
12.5 TABLET, FILM COATED ORAL DAILY
Refills: 0 | Status: DISCONTINUED | OUTPATIENT
Start: 2024-12-28 | End: 2024-12-29

## 2024-12-28 RX ORDER — ACETAMINOPHEN 80 MG/.8ML
650 SOLUTION/ DROPS ORAL EVERY 6 HOURS
Refills: 0 | Status: DISCONTINUED | OUTPATIENT
Start: 2024-12-28 | End: 2024-12-29

## 2024-12-28 RX ORDER — ALPRAZOLAM 0.25 MG/1
0.25 TABLET ORAL DAILY
Refills: 0 | Status: DISCONTINUED | OUTPATIENT
Start: 2024-12-28 | End: 2024-12-29

## 2024-12-28 RX ORDER — FLUTICASONE PROPIONATE AND SALMETEROL 50; 500 UG/1; UG/1
1 POWDER ORAL; RESPIRATORY (INHALATION)
Refills: 0 | Status: DISCONTINUED | OUTPATIENT
Start: 2024-12-28 | End: 2024-12-29

## 2024-12-28 RX ORDER — ALPRAZOLAM 0.25 MG/1
0.5 TABLET ORAL ONCE
Refills: 0 | Status: DISCONTINUED | OUTPATIENT
Start: 2024-12-28 | End: 2024-12-28

## 2024-12-28 RX ORDER — ALBUTEROL SULFATE 90 UG/1
2 INHALANT RESPIRATORY (INHALATION) EVERY 6 HOURS
Refills: 0 | Status: DISCONTINUED | OUTPATIENT
Start: 2024-12-28 | End: 2024-12-29

## 2024-12-28 RX ORDER — TIOTROPIUM BROMIDE MONOHYDRATE 18 UG/1
2 CAPSULE ORAL; RESPIRATORY (INHALATION) DAILY
Refills: 0 | Status: DISCONTINUED | OUTPATIENT
Start: 2024-12-28 | End: 2024-12-29

## 2024-12-28 RX ORDER — ENOXAPARIN SODIUM 60 MG/.6ML
40 INJECTION INTRAVENOUS; SUBCUTANEOUS EVERY 24 HOURS
Refills: 0 | Status: DISCONTINUED | OUTPATIENT
Start: 2024-12-28 | End: 2024-12-29

## 2024-12-28 RX ORDER — SPIRONOLACTONE 50 MG/1
12.5 TABLET ORAL DAILY
Refills: 0 | Status: DISCONTINUED | OUTPATIENT
Start: 2024-12-28 | End: 2024-12-29

## 2024-12-28 RX ORDER — FUROSEMIDE 20 MG
40 TABLET ORAL DAILY
Refills: 0 | Status: DISCONTINUED | OUTPATIENT
Start: 2024-12-29 | End: 2024-12-29

## 2024-12-28 RX ORDER — CARVEDILOL 25 MG/1
6.25 TABLET, FILM COATED ORAL EVERY 12 HOURS
Refills: 0 | Status: DISCONTINUED | OUTPATIENT
Start: 2024-12-28 | End: 2024-12-29

## 2024-12-28 RX ADMIN — ALBUTEROL SULFATE 2 PUFF(S): 90 INHALANT RESPIRATORY (INHALATION) at 18:04

## 2024-12-28 RX ADMIN — ALPRAZOLAM 0.5 MILLIGRAM(S): 0.25 TABLET ORAL at 10:45

## 2024-12-28 RX ADMIN — ALPRAZOLAM 0.25 MILLIGRAM(S): 0.25 TABLET ORAL at 22:15

## 2024-12-28 RX ADMIN — Medication 324 MILLIGRAM(S): at 08:06

## 2024-12-28 NOTE — H&P ADULT - HISTORY OF PRESENT ILLNESS
Patient is a 69 year old female who presents to the emergency room with shortness of breath. She has a known past medical history of stage IV ovarian cancer on chemotherapy, peritoneal carcinomatosis, HFrEF, asthma, and active smoking. She got her last session of chemotherapy about 2 weeks ago. She denies any fever, cough, congestion, chest pain, leg swelling, or dizziness. She does endorse shortness of breath and continues to have it upon my encounter. Cardiology was consulted and recommended admission for diuresis given elevated proBNP and troponin on initial work up. Gynecology also saw the patient in the emergency room.

## 2024-12-28 NOTE — H&P ADULT - NSHPLABSRESULTS_GEN_ALL_CORE
LABS:                        8.8    8.55  )-----------( 97       ( 28 Dec 2024 03:25 )             26.3     12-28    138  |  101  |  10.3  ----------------------------<  172[H]  4.1   |  29.0  |  0.78    Ca    8.8      28 Dec 2024 03:25    TPro  6.0[L]  /  Alb  3.4  /  TBili  0.3[L]  /  DBili  x   /  AST  16  /  ALT  13  /  AlkPhos  70  12-28

## 2024-12-28 NOTE — CONSULT NOTE ADULT - SUBJECTIVE AND OBJECTIVE BOX
GYNECOLOGIC ONCOLOGY CONSULT NOTE    68yo w/ Stage BARBARA carcinoma of Mullerian origin s/p neoadjuvant treatment with Carbo/Taxol w/ PMHx of active tobacco use, b/l adnexal masses and extensive peritoneal carcinomatosis, ovarian CA on chemo, cardiomyopathy and bilateral pleural effusions presents to [facility] after developing worsening shortness of breath.  Patient states for the past few days she's feeling worsening SOB and has difficulty laying down.  Denies HA, slurred speech, chest pain, palpitations, diaphoresis, fever, cough, leg swelling, or abdominal pain.   hsT-T: 103   pro-BNP: 2100         OB/GYN HISTORY:     Last Mammogram:  Last Pap Smear:  Last Colonoscopy:    Past Medical History:   No pertinent past medical history    Abdominal mass    Adnexal mass    Peritoneal carcinoma    Abdominal pain    Vaginal delivery    Thrombocytosis    H/O CHF        Surgical History:    No significant past surgical history    History of biopsy        Home Meds:       Allergies:  No Known Allergies      Family History  FAMILY HISTORY:  FH: pancreatic cancer (Sibling)    FHx: lung cancer (Sibling)    FHx: breast cancer (Mother)        Social History:     REVIEW OF SYSTEMS:    CONSTITUTIONAL: No fever, weight loss, or fatigue  EYES: No eye pain, visual disturbances, or discharge  ENMT:  No difficulty hearing, tinnitus, vertigo; No sinus or throat pain  NECK: No pain or stiffness  BREASTS: No pain, masses, or nipple discharge  RESPIRATORY: No cough, wheezing, chills or hemoptysis; No shortness of breath  CARDIOVASCULAR: No chest pain, palpitations, dizziness, or leg swelling  GASTROINTESTINAL: No abdominal or epigastric pain. No nausea, vomiting, or hematemesis; No diarrhea or constipation. No melena or hematochezia.  GENITOURINARY: No dysuria, frequency, hematuria, or incontinence  NEUROLOGICAL: No headaches, memory loss, loss of strength, numbness, or tremors  SKIN: No itching, burning, rashes, or lesions   LYMPH NODES: No enlarged glands  ENDOCRINE: No heat or cold intolerance; No hair loss  MUSCULOSKELETAL: No joint pain or swelling; No muscle, back, or extremity pain  PSYCHIATRIC: No depression, anxiety, mood swings, or difficulty sleeping  HEME/LYMPH: No easy bruising, or bleeding gums  ALLERY AND IMMUNOLOGIC: No hives or eczema    OBJECTIVE FINDINGS:  Vital Signs Last 24 Hrs  T(C): 36.7 (28 Dec 2024 07:21), Max: 36.7 (28 Dec 2024 07:21)  T(F): 98.1 (28 Dec 2024 07:21), Max: 98.1 (28 Dec 2024 07:21)  HR: 103 (28 Dec 2024 07:21) (103 - 133)  BP: 103/61 (28 Dec 2024 07:21) (103/61 - 181/101)  BP(mean): --  RR: 22 (28 Dec 2024 07:21) (22 - 30)  SpO2: 100% (28 Dec 2024 07:21) (92% - 100%)    Parameters below as of 28 Dec 2024 07:21  Patient On (Oxygen Delivery Method): nasal cannula  O2 Flow (L/min): 3      PHYSICAL EXAM:  GENERAL: NAD, well-developed  HEAD:  Atraumatic, Normocephalic  EYES: EOMI, PERRLA, conjunctiva and sclera clear  ENMT: No tonsillar erythema, exudates, or enlargement; Moist mucous membranes, Good dentition, No lesions  NECK: Supple, No JVD, Normal thyroid  NERVOUS SYSTEM:  Alert & Oriented X3, Good concentration; Motor Strength 5/5 B/L upper and lower extremities; DTRs 2+ intact and symmetric  CHEST/LUNG: Clear to percussion bilaterally; No rales, rhonchi, wheezing, or rubs  HEART: Regular rate and rhythm; No murmurs, rubs, or gallops  ABDOMEN: Soft, Nontender, Nondistended; Bowel sounds present, No rebound, No guarding  EXTREMITIES:  2+ Peripheral Pulses, No clubbing, cyanosis, or edema, Sheridan's sign negative  LYMPH: No lymphadenopathy noted  SKIN: No rashes or lesions  PELVIC:   RECTAL:    LABS:                        8.8    8.55  )-----------( 97       ( 28 Dec 2024 03:25 )             26.3     12-28    138  |  101  |  10.3  ----------------------------<  172[H]  4.1   |  29.0  |  0.78    Ca    8.8      28 Dec 2024 03:25    TPro  6.0[L]  /  Alb  3.4  /  TBili  0.3[L]  /  DBili  x   /  AST  16  /  ALT  13  /  AlkPhos  70  12-28      Urinalysis Basic - ( 28 Dec 2024 03:25 )    Color: x / Appearance: x / SG: x / pH: x  Gluc: 172 mg/dL / Ketone: x  / Bili: x / Urobili: x   Blood: x / Protein: x / Nitrite: x   Leuk Esterase: x / RBC: x / WBC x   Sq Epi: x / Non Sq Epi: x / Bacteria: x        RADIOLOGY & ADDITIONAL STUDIES:    Hospital Medications:   MEDICATIONS  (STANDING):  ALPRAZolam 0.5 milliGRAM(s) Oral Once    MEDICATIONS  (PRN):   GYNECOLOGIC ONCOLOGY CONSULT NOTE    70yo known patient w/ Stage BARBARA carcinoma of Mullerian origin s/p neoadjuvant treatment with Carbo/Taxol w/ PMHx of active tobacco use, cardiomyopathy and HFrEF presents to [facility] after developing worsening shortness of breath.  Patient states for the past few days she's feeling worsening SOB and has difficulty laying down.  She denies fevers, chills, CP, N/V, abdominal pain or vaginal bleeding. She states feeling anxious. Similar symptoms happened last week but she states "she calmed herself down" and did not come to ED.     In ED, patient noted to have elevated BNP and Troponin.     Cancer Hx:  # Ascites   S/p IR drainage 2L on 08/09/24; 5L on 8/21/24     #Stage BARBARA ovarian vs. primary peritoneal cancer  - IR biopsy (8/9/24) demonstrated metastatic carcinoma of Mullerian origin  - Plan: Carbo AUC 5 / Taxol 175mg/m2 D1 q21d x 3C followed by repeat imaging +/- interval debulk with HIPEC followed by at least 2-3C adjuvant treatment  - S/p C6 Carbo/Taxol, last on 12/9.   - Next appt scheduled for 1/6/25 to discuss surgery and next steps       MedHx: HFrEF - 25%, Cardiomyopathy  SurgHx: None  FamHx: Breast (mother, dx 70s), Pancreatic (Brother, dx 60s), Melanoma (Brother)  SocialHx: Active tobacco use (1/2pack per day x50yrs) - patient states she last had a cigarettes yesterday, Social EtOH, Denies illicit drug use; Ambulated independently at home   Medications: Spironolactone, Losartan, Coreg  Allergies: NKDA      OBJECTIVE FINDINGS:  Vital Signs Last 24 Hrs  T(C): 36.7 (28 Dec 2024 07:21), Max: 36.7 (28 Dec 2024 07:21)  T(F): 98.1 (28 Dec 2024 07:21), Max: 98.1 (28 Dec 2024 07:21)  HR: 103 (28 Dec 2024 07:21) (103 - 133)  BP: 103/61 (28 Dec 2024 07:21) (103/61 - 181/101)  BP(mean): --  RR: 22 (28 Dec 2024 07:21) (22 - 30)  SpO2: 100% (28 Dec 2024 07:21) (92% - 100%)    Parameters below as of 28 Dec 2024 07:21  Patient On (Oxygen Delivery Method): nasal cannula  O2 Flow (L/min): 3      PHYSICAL EXAM:  GENERAL: NAD, well-developed  NERVOUS SYSTEM:  Alert & Oriented X3  CHEST/LUNG: CTAB, Supplemental O2 via NC seen in place   HEART: Regular rate and rhythm  ABDOMEN: Soft, Nontender, Nondistended; Bowel sounds present  EXTREMITIES:  2+ Peripheral Pulses, No clubbing, cyanosis, or edema,     LABS:                        8.8    8.55  )-----------( 97       ( 28 Dec 2024 03:25 )             26.3     12-28    138  |  101  |  10.3  ----------------------------<  172[H]  4.1   |  29.0  |  0.78    Ca    8.8      28 Dec 2024 03:25    TPro  6.0[L]  /  Alb  3.4  /  TBili  0.3[L]  /  DBili  x   /  AST  16  /  ALT  13  /  AlkPhos  70  12-28      Urinalysis Basic - ( 28 Dec 2024 03:25 )    Color: x / Appearance: x / SG: x / pH: x  Gluc: 172 mg/dL / Ketone: x  / Bili: x / Urobili: x   Blood: x / Protein: x / Nitrite: x   Leuk Esterase: x / RBC: x / WBC x   Sq Epi: x / Non Sq Epi: x / Bacteria: x

## 2024-12-28 NOTE — CONSULT NOTE ADULT - PROBLEM SELECTOR RECOMMENDATION 2
Monitor on Tele, check CBC, BMP, Trend hsT-T, pro-BNP 2100,   - Lasix 40mg IV daily for short term  - Strict I/O and daily standing weights (if possible), low Na diet  - trend daily BMP, keep K > 4, Mg > 2    - monitor renal function with ongoing diuresis   - continue Carvedilol, Losartan  - CXR no edema/congestion  - TTE  - medication and dietary compliance encouraged    case d/w Dr. Virgen

## 2024-12-28 NOTE — ED ADULT TRIAGE NOTE - GLASGOW COMA SCALE: BEST VERBAL RESPONSE, MLM
Detail Level: Detailed Add 87789 Cpt? (Important Note: In 2017 The Use Of 52742 Is Being Tracked By Cms To Determine Future Global Period Reimbursement For Global Periods): yes (V5) oriented

## 2024-12-28 NOTE — H&P ADULT - ASSESSMENT
68 y/o F PMH of Stage BARBARA ovarian CA w/ carcinomatosis (currently on Carboplatin AUC 5 / Paclitaxel 175mg/m2 s/p C5 on 11/18/24; follows w/ chintan), HFrEF (EF 25%), active tobacco use presented to ED with complaints of shortness of breath. She was recently admitted for a COPD exacerbation and was treated with steroids and nebs. She now presents with elevated proBNP and troponin. Patient requires inpatient admission for IV diuresis as per Cardiology.    Acute on chronic systolic congestive heart failure  Elevated troponin likely secondary to supply demand ischemia  - proBNP elevated at 2100  - troponin trending from 103-110  - CXR no edema/congestion  - Lasix 40mg IV daily for short term  - Strict I/O and daily standing weights (if possible), low Na diet  - trend daily BMP, keep K > 4, Mg > 2    - monitor renal function with ongoing diuresis   - Continue Losartan, Coreg, Aldactone  - Follow up TTE  - Follow up EKG in the AM  - Follow up Cardiology recommendations    Acute hypoxic respiratory failure  - RVP negative  - CXR showing cardiomegaly but no gross consolidation  - CT negative for PE but showing emphysema  - Saturating 100% on 3L NC  - Continue Duonebs Q6H   - No indication for steroids at this time, was recently discharge with course of steroids  - May wean off of nasal cannula as tolerated    Stage 4 Ovarian Ca  - On chemotherapy as an outpatient  - GynOnc to follow the patient while in house    Pancytopenia  - Likely secondary to chemo/cancer  - Monitor counts    Tobacco abuse  - Continue nicotine patch  - Smoking cessation advised    Anxiety  - Continue Xanax 0.25mg daily PRN (home medication)      DVT/GI ppx: Lovenox  Diet: DASH  Code Status:Full code   68 y/o F PMH of Stage BARBARA ovarian CA w/ carcinomatosis (currently on Carboplatin AUC 5 / Paclitaxel 175mg/m2 s/p C5 on 11/18/24; follows w/ chintan), HFrEF (EF 25%), active tobacco use presented to ED with complaints of shortness of breath. She was recently admitted for a COPD exacerbation and was treated with steroids and nebs. She now presents with elevated proBNP and troponin. Patient requires inpatient admission for IV diuresis as per Cardiology.    Acute on chronic systolic congestive heart failure  Elevated troponin likely secondary to supply demand ischemia  - proBNP elevated at 2100  - troponin trending from 103-110  - CXR no edema/congestion  - Lasix 40mg IV daily for short term  - Strict I/O and daily standing weights (if possible), low Na diet  - trend daily BMP, keep K > 4, Mg > 2    - monitor renal function with ongoing diuresis   - Continue Losartan, Coreg, Aldactone  - Follow up TTE  - Follow up EKG in the AM  - Follow up Cardiology recommendations    Acute hypoxic respiratory failure  - RVP negative  - CXR showing cardiomegaly but no gross consolidation  - CT negative for PE but showing emphysema  - Saturating 100% on 3L NC  - Continue Duonebs Q6H   - No indication for steroids at this time, was recently discharge with course of steroids  - May wean off of nasal cannula as tolerated    Stage 4 Ovarian Ca  - On chemotherapy as an outpatient  - GynOnc to follow the patient while in house    Pancytopenia  - Likely secondary to chemo/cancer  - Monitor counts    Tobacco abuse  - Continue nicotine patch  - Smoking cessation advised    Anxiety  - Continue Xanax 0.25mg daily PRN (home medication)      DVT/GI ppx: Lovenox  Diet: DASH  Code Status:Full code  Dispo: Requiring IV diuresis, likely 1-2 days, pending TTE, EKG, diuresis and final Cardiology recommendations; pending PT evaluation

## 2024-12-28 NOTE — CONSULT NOTE ADULT - ASSESSMENT
68yo known patient of ONC service w/ Stage BARBARA carcinoma of Muller[place] origin s/p neoadjuvant treatment with C6 Carbo/Taxol w/ PMHx of active tobacco use, cardiomyopathy and HFrEF presents to [facility] ED after developing worsening shortness of breath.    A/P:  - BP wnl, HR mildly elevated, Patient on 3L supplemental O2.   - Patient with elevated Troponin and BNP  - Cardiology recommended admission to telemetry with trending of labs.  - No GYN concerns at this time. GYN Onc will continue to follow.    D/w Dr. Jalloh

## 2024-12-28 NOTE — ED PROVIDER NOTE - CLINICAL SUMMARY MEDICAL DECISION MAKING FREE TEXT BOX
68yo female with PMH ovarian ca (last chemo 2 weeks ago), congestive heart failure, cardiomyopathy, presenting with shortness of breath. EKG showed diffuse ST depressions V4-V6, troponin elevated, pBNP elevated. Cardiology consulted- recommending lasix 40mg BID, admission to telemetry. Patient pending evaluation by gyn oncology, admission. Patient signed out to incoming physician pending results of testing and reassessment.  All decisions regarding the progression of care will be made at their discretion.

## 2024-12-28 NOTE — ED ADULT NURSE REASSESSMENT NOTE - NS ED NURSE REASSESS COMMENT FT1
Assumed care of pt at this time. Patient a&ox4, no acute distress, resp nonlabored, resting comfortably in bed. Pt has no complaints at this time. Denies headache, dizziness, chest pain, palpitations, SOB, fevers, chills, weakness at this time. Patient awaiting cardiology consult. Safety maintained with bed locked and in lowest position. Pt remains on continuos cardiac monitoring NSR HR 98,  2L nasal cannula. Assumed care of pt at this time. Patient a&ox4, no acute distress, resp nonlabored, resting comfortably in bed. Pt has no complaints at this time. Denies headache, dizziness, chest pain, palpitations, SOB, fevers, chills, weakness at this time. Patient awaiting cardiology consult. Safety maintained with bed locked and in lowest position. Pt remains on continuos cardiac monitoring NSR HR 98,  100% 2L nasal cannula.

## 2024-12-28 NOTE — H&P ADULT - NSHPPHYSICALEXAM_GEN_ALL_CORE
T(C): 36.9 (12-28-24 @ 10:36), Max: 36.9 (12-28-24 @ 10:36)  HR: 97 (12-28-24 @ 10:36) (97 - 133)  BP: 100/61 (12-28-24 @ 10:36) (100/61 - 181/101)  RR: 20 (12-28-24 @ 10:36) (20 - 30)  SpO2: 100% (12-28-24 @ 10:36) (92% - 100%)    CONSTITUTIONAL: Well groomed, no apparent distress  EYES: PERRLA and symmetric, EOMI, No conjunctival or scleral injection, non-icteric  ENMT: Oral mucosa with moist membranes. Normal dentition; no pharyngeal injection or exudates  NECK: Supple, symmetric and without tracheal deviation   RESP: No respiratory distress, poor inspiratory effort, mild rhonchi bilaterally  CV: RRR, +S1S2, no MRG; no JVD; no peripheral edema  GI: Soft, NT, ND, no rebound, no guarding  LYMPH: No cervical LAD or tenderness  SKIN: No rashes or ulcers noted; no subcutaneous nodules or induration palpable  NEURO: CN II-XII intact; sensation intact in upper and lower extremities b/l to light touch   PSYCH: A+O x 3, flat affect, calm, uncooperative with physical exam

## 2024-12-28 NOTE — CONSULT NOTE ADULT - ASSESSMENT
70yo F w/ PMHx of active tobacco use, b/l adnexal masses and extensive peritoneal carcinomatosis, ovarian CA on chemo, cardiomyopathy and bilateral pleural effusions presents to [facility] after developing worsening shortness of breath.  Patient states for the past few days she's feeling worsening SOB and has difficulty laying down.  Denies HA, slurred speech, chest pain, palpitations, diaphoresis, fever, cough, leg swelling, or abdominal pain.   hsT-T: 103   pro-BNP: 2100    Cardiology called for elevated Trop 68yo F w/ PMHx of active tobacco use, b/l adnexal masses and extensive peritoneal carcinomatosis, ovarian CA on chemo, cardiomyopathy and bilateral pleural effusions presents to [facility] after developing worsening shortness of breath.  Patient states for the past few days she's feeling worsening SOB and has difficulty laying down.  Denies HA, slurred speech, chest pain, palpitations, diaphoresis, fever, cough, leg swelling, or abdominal pain.   hsT-T: 103   pro-BNP: 2100    Cardiology called for elevated Trop   CT Angio: No PE

## 2024-12-28 NOTE — CONSULT NOTE ADULT - PROBLEM SELECTOR RECOMMENDATION 9
Admit to Tele, follow labs including hsT-T and ECG without ischemia  - no acute complaints, changes in symptoms since admission  - the cause of low level cardiac Trops likely due to demand ischemia and not a direct consequence of ACS (recent 08/23/2024 [facility] w/ LM: No disease, short LM, LAD: Luminal irregularities, LCx: Luminal irregularities, RCA: Mild diffuse disease)  - trend hsT-T and repeat ECG in the morning  - Tele monitoring  - patient w/ HTN, heart failure, general infections (e.g., sepsis), PE and significant anemia can all cause troponin release  - no acute cardiac intervention at this time

## 2024-12-28 NOTE — ED PROVIDER NOTE - CHIEF COMPLAINT
Dariela Sebastian (:  1948) is a 76 y.o. female,Established patient, here for evaluation of the following chief complaint(s):  Cough and Congestion      Assessment & Plan   1. Acute URI  -     benzonatate (TESSALON) 100 MG capsule; Take 1 capsule by mouth 2 times daily as needed for Cough, Disp-20 capsule, R-0Normal  -     doxycycline hyclate (VIBRA-TABS) 100 MG tablet; Take 1 tablet by mouth 2 times daily for 10 days, Disp-20 tablet, R-0Normal  2. Cough, unspecified type  -     AMB POC RAPID INFLUENZA TEST  -     AMB POC COVID-19 COV  -     AMB POC RSV    Discussed medications, side effects and risks versus benefits in detail.  Increase fluids and rest.  Mucinex, Flonase and an allergy pill encouraged for symptom management.    Return if symptoms worsen or fail to improve.       Subjective   Cough  Pertinent negatives include no chest pain, chills, ear pain, eye redness, fever, headaches, myalgias, rash, sore throat or shortness of breath. There is no history of environmental allergies.   Pt with congestion x 10 days.  Yellow-green drainage.  Cough with colored sputum.  No sore throat, sinus pain or ear pain.  No fever.  No chest pain.  No shortness of breath or wheezing.  No nausea or vomiting.      Review of Systems   Constitutional:  Negative for chills and fever.   HENT:  Positive for congestion. Negative for ear pain, sinus pain and sore throat.    Eyes:  Negative for pain and redness.   Respiratory:  Positive for cough. Negative for shortness of breath.    Cardiovascular:  Negative for chest pain and palpitations.   Gastrointestinal:  Negative for blood in stool, constipation, diarrhea, nausea and vomiting.   Endocrine: Negative for polydipsia.   Genitourinary:  Negative for dysuria, frequency and urgency.   Musculoskeletal:  Negative for arthralgias, back pain and myalgias.   Skin:  Negative for rash.   Allergic/Immunologic: Negative for environmental allergies.   Neurological:  Negative for  The patient is a 69y Female complaining of shortness of breath.

## 2024-12-28 NOTE — CONSULT NOTE ADULT - SUBJECTIVE AND OBJECTIVE BOX
[facility]                                              CARDIOLOGY AT [place] [address]                                             Telephone: [phone]. Fax:[phone]                                                       CARDIOLOGY CONSULTATION NOTE                                                                                             History obtained by: Patient and medical record  Community Cardiologist: Dr. Dinh    obtained: Yes [  ] No [  ]  Reason for Consultation: SOB  Available out pt records reviewed: Yes [x] No [  ]    Chief complaint:  I feel very short of breath     HPI: Patient is a 68yo F w/ PMHx of active tobacco use, b/l adnexal masses and extensive peritoneal carcinomatosis, ovarian CA on chemo, cardiomyopathy and bilateral pleural effusions presents to [facility] after developing worsening shortness of breath.  Patient states for the past few days she's feeling worsening SOB and has difficulty laying down.  Denies HA, slurred speech, chest pain, palpitations, diaphoresis, fever, cough, leg swelling, or abdominal pain.   hsT-T: 103   pro-BNP: 2100    Cardiology called for elevated Trop    CARDIAC TESTING   08/23/2024 ECHO: CONCLUSIONS:  1. Left ventricular systolic function is severely decreased with an ejection fraction visually estimated at 25 to 30 %.  2. Normal right ventricular cavity size and normal right ventricular systolic function.  3. The mitral valve appears thickened, with moderate mitral regurgitaiton. There is a small mobile echodensity attached to the papillary muscle which represents a calcified ruptured chordae.  4. Trileaflet aortic valve with normal systolic excursion, with trace aortic regurgitation.  5. No evidence of a patent foramen ovale by color flow Doppler.  6. No evidence of left atrial or left atrial appendage thrombus. The left atrial appendage emptying velocity is normal at 60 cm/s.  7. Mild aortic calcification seen in the aortic root.  8. Mild non-mobile focal atheroma which measures up to 2.30 mm in the visualized portions of the descending aorta.  9. Small pericardial effusion noted adjacent to the right atrium, small pericardial effusion in the transverse sinus adjacent to the BLAKE and small to moderate sized pericardial effusion noted adjacent to the posterolateral left ventricle with no evidence of hemodynamic compromise (or echocardiographic evidence of cardiac tamponade).    STRESS:    08/23/2024 CATH:  Cath Lab Report    Diagnostic Cardiologist:       Gildardo Dinh MD   Referring Physician:           Praveen White MD   Procedures:   1.    Ultrasound Guided Access   2.    Arterial Access - Right Radial   3.    Left Heart Cath   4.    Diagnostic Coronary Angiography   Indications: Congestive heart failure, acute systolic   Lab Visit Indication:      cardiomyopathy   Diagnostic Conclusions:   LM: No disease,  short LM   LAD: Luminal irregularities    LCx: Luminal irregularities    RCA: Mild diffuse disease   LVEDP 10   Recommendations:   Medical management for Non-ischemic Cardiomyopathy    Acute complication:    No complications     ELECTROPHYSIOLOGY:     PAST MEDICAL HISTORY  Ovarian CA  Peritoneal carcinoma  Abdominal pain  Vaginal delivery  Thrombocytosis  Systolic HF    PAST SURGICAL HISTORY  History of biopsy    SOCIAL HISTORY:  Denies smoking/alcohol/drugs    FAMILY HISTORY:  FH: pancreatic cancer (Sibling)  FHx: lung cancer (Sibling)  FHx: breast cancer (Mother)    Family History of Cardiovascular Disease:  Yes [  ] No [  ]  Coronary Artery Disease in first degree relative: Yes [  ] No [  ]  Sudden Cardiac Death in First degree relative: Yes [  ] No [  ]    HOME MEDICATIONS:  amoxicillin-clavulanate 875 mg-125 mg oral tablet: 1 tab(s) orally 2 times a day  Zithromax 250 mg oral tablet: 1 tab(s) orally once a day  Xanax 0.25 mg oral tablet: 1 tab(s) orally once a day as needed for  anxiety MDD: 1 tab  predniSONE 10 mg oral tablet: 1 tab(s) orally once a day Please take 4 tabs daily x 3 days then 3 tabs daily x 3 days then 2 tabs daily x 3 days then 1 tab daily x 3 days  Symbicort 160 mcg-4.5 mcg/inh inhalation aerosol: 2 puff(s) inhaled 2 times a day  tiotropium 2.5 mcg/inh inhalation aerosol: 2 puff(s) inhaled once a day  Spironolactone 25 mg oral tablet: 0.5 tab(s) orally once a day  Coreg 6.25 mg oral tablet: 1 tab(s) orally every 12 hours  Losartan 25 mg oral tablet: 0.5 tab(s) orally once a day  Albuterol 90 mcg/inh inhalation aerosol: 2 puff(s) inhaled every 6 hours as needed for  shortness of breath and/or wheezing    ALLERGIES: No Known Allergies    REVIEW OF SYMPTOMS:   CONSTITUTIONAL: No fever, no chills, no weight loss, no weight gain, no fatigue   ENMT:  No vertigo; No sinus or throat pain  NECK: No pain or stiffness  CARDIOVASCULAR: No chest pain, + dyspnea, no syncope/presyncope, no palpitations, no dizziness, no Orthopnea, no Paroxsymal nocturnal dyspnea  RESPIRATORY: + shortness of breath, no cough, no wheezing  : No dysuria, no hematuria   GI: no nausea, no diarrhea, no constipation, no abdominal pain   NEURO: No headache, no slurred speech   MUSCULOSKELETAL: No joint pain or swelling  PSYCH: no anxiety    ALL OTHER REVIEW OF SYSTEMS ARE NEGATIVE.    VITAL SIGNS:  T(C): 36.3 (12-28-24 @ 01:22), Max: 36.3 (12-28-24 @ 01:22)  T(F): 97.3 (12-28-24 @ 01:22), Max: 97.3 (12-28-24 @ 01:22)  HR: 115 (12-28-24 @ 01:37) (115 - 133)  BP: 181/101 (12-28-24 @ 01:22) (181/101 - 181/101)  RR: 24 (12-28-24 @ 01:37) (24 - 30)  SpO2: 100% (12-28-24 @ 01:37) (92% - 100%)    INTAKE AND OUTPUT:     PHYSICAL EXAM:  Constitutional: Comfortable while sitting up, no acute distress   HEENT: Atraumatic, neck is supple, no JVD  CNS: A&Ox3. No focal deficits, motor 5/5 b/l and speech fluent    Respiratory: CTAB, unlabored   Cardiovascular: RRR, S1S2, no murmur or rubs  Gastrointestinal: Soft, non-tender   Extremities: 2+ Peripheral Pulses, no cyanosis, or edema  Psychiatric: Calm  Skin: Warm and dry, no ulcers on extremities     LABS:                       8.8    8.55  )-----------( 97       ( 28 Dec 2024 03:25 )             26.3     12-28    138  |  101  |  10.3  ----------------------------<  172[H]  4.1   |  29.0  |  0.78    Ca    8.8      28 Dec 2024 03:25    TPro  6.0[L]  /  Alb  3.4  /  TBili  0.3[L]  /  DBili  x   /  AST  16  /  ALT  13  /  AlkPhos  70  12-28    Urinalysis Basic - ( 28 Dec 2024 03:25 )  Color: x / Appearance: x / SG: x / pH: x  Gluc: 172 mg/dL / Ketone: x  / Bili: x / Urobili: x   Blood: x / Protein: x / Nitrite: x   Leuk Esterase: x / RBC: x / WBC x   Sq Epi: x / Non Sq Epi: x / Bacteria: x    INTERPRETATION OF TELEMETRY:   ECG: NSR  Prior ECG: Yes [x] No [  ]    RADIOLOGY & ADDITIONAL STUDIES:    X-ray:    CT scan:   MRI:   US:                                                [facility]                                              CARDIOLOGY AT [place] [address]                                             Telephone: [phone]. Fax:[phone]                                                       CARDIOLOGY CONSULTATION NOTE                                                                                             History obtained by: Patient and medical record  Community Cardiologist: Dr. Dinh    obtained: Yes [  ] No [  ]  Reason for Consultation: SOB  Available out pt records reviewed: Yes [x] No [  ]    Chief complaint:  I feel very short of breath     HPI: Patient is a 70yo F w/ PMHx of active tobacco use, b/l adnexal masses and extensive peritoneal carcinomatosis, ovarian CA on chemo, cardiomyopathy and bilateral pleural effusions presents to [facility] after developing worsening shortness of breath.  Patient states for the past few days she's feeling worsening SOB and has difficulty laying down.  Denies HA, slurred speech, chest pain, palpitations, diaphoresis, fever, cough, leg swelling, or abdominal pain.   hsT-T: 103   pro-BNP: 2100    Cardiology called for elevated Trop    CARDIAC TESTING   08/23/2024 ECHO: CONCLUSIONS:  1. Left ventricular systolic function is severely decreased with an ejection fraction visually estimated at 25 to 30 %.  2. Normal right ventricular cavity size and normal right ventricular systolic function.  3. The mitral valve appears thickened, with moderate mitral regurgitaiton. There is a small mobile echodensity attached to the papillary muscle which represents a calcified ruptured chordae.  4. Trileaflet aortic valve with normal systolic excursion, with trace aortic regurgitation.  5. No evidence of a patent foramen ovale by color flow Doppler.  6. No evidence of left atrial or left atrial appendage thrombus. The left atrial appendage emptying velocity is normal at 60 cm/s.  7. Mild aortic calcification seen in the aortic root.  8. Mild non-mobile focal atheroma which measures up to 2.30 mm in the visualized portions of the descending aorta.  9. Small pericardial effusion noted adjacent to the right atrium, small pericardial effusion in the transverse sinus adjacent to the BLAKE and small to moderate sized pericardial effusion noted adjacent to the posterolateral left ventricle with no evidence of hemodynamic compromise (or echocardiographic evidence of cardiac tamponade).    STRESS:    08/23/2024 CATH:  Cath Lab Report    Diagnostic Cardiologist:       Gildardo Dinh MD   Referring Physician:           Praveen White MD   Procedures:   1.    Ultrasound Guided Access   2.    Arterial Access - Right Radial   3.    Left Heart Cath   4.    Diagnostic Coronary Angiography   Indications: Congestive heart failure, acute systolic   Lab Visit Indication:      cardiomyopathy   Diagnostic Conclusions:   LM: No disease,  short LM   LAD: Luminal irregularities    LCx: Luminal irregularities    RCA: Mild diffuse disease   LVEDP 10   Recommendations:   Medical management for Non-ischemic Cardiomyopathy    Acute complication:    No complications     ELECTROPHYSIOLOGY:     PAST MEDICAL HISTORY  Ovarian CA  Peritoneal carcinoma  Abdominal pain  Vaginal delivery  Thrombocytosis  Systolic HF    PAST SURGICAL HISTORY  History of biopsy    SOCIAL HISTORY:  Denies smoking/alcohol/drugs    FAMILY HISTORY:  FH: pancreatic cancer (Sibling)  FHx: lung cancer (Sibling)  FHx: breast cancer (Mother)    Family History of Cardiovascular Disease:  Yes [  ] No [  ]  Coronary Artery Disease in first degree relative: Yes [  ] No [  ]  Sudden Cardiac Death in First degree relative: Yes [  ] No [  ]    HOME MEDICATIONS:  amoxicillin-clavulanate 875 mg-125 mg oral tablet: 1 tab(s) orally 2 times a day  Zithromax 250 mg oral tablet: 1 tab(s) orally once a day  Xanax 0.25 mg oral tablet: 1 tab(s) orally once a day as needed for  anxiety MDD: 1 tab  predniSONE 10 mg oral tablet: 1 tab(s) orally once a day Please take 4 tabs daily x 3 days then 3 tabs daily x 3 days then 2 tabs daily x 3 days then 1 tab daily x 3 days  Symbicort 160 mcg-4.5 mcg/inh inhalation aerosol: 2 puff(s) inhaled 2 times a day  tiotropium 2.5 mcg/inh inhalation aerosol: 2 puff(s) inhaled once a day  Spironolactone 25 mg oral tablet: 0.5 tab(s) orally once a day  Coreg 6.25 mg oral tablet: 1 tab(s) orally every 12 hours  Losartan 25 mg oral tablet: 0.5 tab(s) orally once a day  Albuterol 90 mcg/inh inhalation aerosol: 2 puff(s) inhaled every 6 hours as needed for  shortness of breath and/or wheezing    ALLERGIES: No Known Allergies    REVIEW OF SYMPTOMS:   CONSTITUTIONAL: No fever, no chills, no weight loss, no weight gain, no fatigue   ENMT:  No vertigo; No sinus or throat pain  NECK: No pain or stiffness  CARDIOVASCULAR: No chest pain, + dyspnea, no syncope/presyncope, no palpitations, no dizziness, no Orthopnea, no Paroxsymal nocturnal dyspnea  RESPIRATORY: + shortness of breath, no cough, no wheezing  : No dysuria, no hematuria   GI: no nausea, no diarrhea, no constipation, no abdominal pain   NEURO: No headache, no slurred speech   MUSCULOSKELETAL: No joint pain or swelling  PSYCH: no anxiety    ALL OTHER REVIEW OF SYSTEMS ARE NEGATIVE.    VITAL SIGNS:  T(C): 36.3 (12-28-24 @ 01:22), Max: 36.3 (12-28-24 @ 01:22)  T(F): 97.3 (12-28-24 @ 01:22), Max: 97.3 (12-28-24 @ 01:22)  HR: 115 (12-28-24 @ 01:37) (115 - 133)  BP: 181/101 (12-28-24 @ 01:22) (181/101 - 181/101)  RR: 24 (12-28-24 @ 01:37) (24 - 30)  SpO2: 100% (12-28-24 @ 01:37) (92% - 100%)    INTAKE AND OUTPUT:     PHYSICAL EXAM:  Constitutional: Comfortable while sitting up, no acute distress   HEENT: Atraumatic, neck is supple, no JVD  CNS: A&Ox3. No focal deficits, motor 5/5 b/l and speech fluent    Respiratory: CTAB, unlabored   Cardiovascular: RRR, S1S2, no murmur or rubs  Gastrointestinal: Soft, non-tender   Extremities: 2+ Peripheral Pulses, no cyanosis, or edema  Psychiatric: Calm  Skin: Warm and dry, no ulcers on extremities     LABS:                       8.8    8.55  )-----------( 97       ( 28 Dec 2024 03:25 )             26.3     12-28    138  |  101  |  10.3  ----------------------------<  172[H]  4.1   |  29.0  |  0.78    Ca    8.8      28 Dec 2024 03:25    TPro  6.0[L]  /  Alb  3.4  /  TBili  0.3[L]  /  DBili  x   /  AST  16  /  ALT  13  /  AlkPhos  70  12-28    Urinalysis Basic - ( 28 Dec 2024 03:25 )  Color: x / Appearance: x / SG: x / pH: x  Gluc: 172 mg/dL / Ketone: x  / Bili: x / Urobili: x   Blood: x / Protein: x / Nitrite: x   Leuk Esterase: x / RBC: x / WBC x   Sq Epi: x / Non Sq Epi: x / Bacteria: x    INTERPRETATION OF TELEMETRY: Sinus tachycardia  ECG: Sinus tachy @11bpm, frequent PVCs; inferolateral ST changes  Prior ECG: Yes [x] No [  ]    RADIOLOGY & ADDITIONAL STUDIES:   CT Angio scan:  FINDINGS:  LUNGS AND LARGE AIRWAYS: Mild upper lobe predominant centrilobular emphysema. No airspace consolidation. The central airways are patent.  PLEURA: No pleural effusion.  VESSELS: Aortic calcifications. Coronary artery calcifications. No pulmonary embolism.  HEART: Cardiomegaly. Small pericardial effusion.  MEDIASTINUM AND XIN: No lymphadenopathy.  CHEST WALL AND LOWER NECK: Mediport with tip in SVC.  VISUALIZED UPPER ABDOMEN: Within normal limits.  BONES: Mild degenerative changes.  IMPRESSION: No pulmonary embolism.    MRI:   US:

## 2024-12-28 NOTE — CONSULT NOTE ADULT - ATTENDING COMMENTS
Patient seen and examined at bedside. 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 Emelyn Jalloh MD  Advanced Pelvic Surgery Fellow

## 2024-12-28 NOTE — ED PROVIDER NOTE - CARE PLAN
1 Principal Discharge DX:	Acute on chronic systolic heart failure  Secondary Diagnosis:	Troponin level elevated

## 2024-12-28 NOTE — ED ADULT NURSE NOTE - OBJECTIVE STATEMENT
Pt presents to ED c/o SOB. Pt recently diagnosed with ovarian cancer. Last chemo 2 weeks ago. Pt denies any CP, HA, dizziness, vision changes, abd pain, NVD, fever/chills, cough, and urinary symptoms at this time. Pt sat at 93% RA, placed on 2L NC. NAD. Pt made aware of plan of care and verbalized understanding.

## 2024-12-28 NOTE — ED ADULT NURSE REASSESSMENT NOTE - NS ED NURSE REASSESS COMMENT FT1
Patient a&ox4, no acute distress, resp nonlabored, resting comfortably in bed. Pt has no complaints at this time. Denies headache, dizziness, chest pain, palpitations, SOB. Safety maintained. Pt remains on continuos cardiac monitoring NSR HR 97,  100% 3L nasal cannula.

## 2024-12-28 NOTE — ED ADULT NURSE NOTE - NSFALLUNIVINTERV_ED_ALL_ED
Bed/Stretcher in lowest position, wheels locked, appropriate side rails in place/Call bell, personal items and telephone in reach/Instruct patient to call for assistance before getting out of bed/chair/stretcher/Non-slip footwear applied when patient is off stretcher/[place] to call system/Physically safe environment - no spills, clutter or unnecessary equipment/Purposeful proactive rounding/Room/bathroom lighting operational, light cord in reach

## 2024-12-28 NOTE — CONSULT NOTE ADULT - NS ATTEND AMEND GEN_ALL_CORE FT
cardiomyopathy .  non-ischemic cardiomyopathy    axniety,.  ct IV diuresis.  Add farxiga 2.5 mg daily. ct aldaconte ARB and beta-blocker . GDMT.  eleavted trops type II.  CHF.  and anemia.   change to PO lasix 40 mg day after tomorrow.   no furtrhe cardiac work up is needed. Will sign off.  please call for any further questions

## 2024-12-28 NOTE — ED ADULT TRIAGE NOTE - CHIEF COMPLAINT QUOTE
pt BIBA for sob x1 hr pt noted tachycardic, tachypneic, tripoding and speaking in one word sentences shallow respirations pt o2 sat 92 on RA

## 2024-12-28 NOTE — ED ADULT NURSE NOTE - AS PAIN REST
[General Appearance - Well Developed] : well developed [Normal Appearance] : normal appearance [Well Groomed] : well groomed [General Appearance - Well Nourished] : well nourished [No Deformities] : no deformities [General Appearance - In No Acute Distress] : no acute distress [Normal Conjunctiva] : the conjunctiva exhibited no abnormalities [Eyelids - No Xanthelasma] : the eyelids demonstrated no xanthelasmas [Normal Oral Mucosa] : normal oral mucosa [No Oral Pallor] : no oral pallor [No Oral Cyanosis] : no oral cyanosis [Normal Jugular Venous A Waves Present] : normal jugular venous A waves present [Normal Jugular Venous V Waves Present] : normal jugular venous V waves present [No Jugular Venous Matthews A Waves] : no jugular venous matthews A waves [Respiration, Rhythm And Depth] : normal respiratory rhythm and effort [Exaggerated Use Of Accessory Muscles For Inspiration] : no accessory muscle use [Auscultation Breath Sounds / Voice Sounds] : lungs were clear to auscultation bilaterally [Heart Rate And Rhythm] : heart rate and rhythm were normal [Heart Sounds] : normal S1 and S2 [Murmurs] : no murmurs present [Abdomen Soft] : soft [Abdomen Tenderness] : non-tender [Abdomen Mass (___ Cm)] : no abdominal mass palpated [Abnormal Walk] : normal gait [Gait - Sufficient For Exercise Testing] : the gait was sufficient for exercise testing [Nail Clubbing] : no clubbing of the fingernails [Cyanosis, Localized] : no localized cyanosis [Petechial Hemorrhages (___cm)] : no petechial hemorrhages 0 (no pain/absence of nonverbal indicators of pain) [Skin Color & Pigmentation] : normal skin color and pigmentation [] : no rash [No Venous Stasis] : no venous stasis [Skin Lesions] : no skin lesions [No Skin Ulcers] : no skin ulcer [No Xanthoma] : no  xanthoma was observed [Oriented To Time, Place, And Person] : oriented to person, place, and time [Affect] : the affect was normal [Mood] : the mood was normal [No Anxiety] : not feeling anxious

## 2024-12-28 NOTE — ED PROVIDER NOTE - OBJECTIVE STATEMENT
70yo female with PMH ovarian ca (follows with Dr. Mata/Dick), last chemo 2 weeks ago, asthma, presenting with shortness of breath. No fevers/cough/congestion. No leg swelling, h/o DVT/PE.

## 2024-12-29 VITALS
RESPIRATION RATE: 19 BRPM | HEART RATE: 93 BPM | DIASTOLIC BLOOD PRESSURE: 61 MMHG | OXYGEN SATURATION: 97 % | TEMPERATURE: 98 F | SYSTOLIC BLOOD PRESSURE: 102 MMHG

## 2024-12-29 LAB
ANION GAP SERPL CALC-SCNC: 12 MMOL/L — SIGNIFICANT CHANGE UP (ref 5–17)
BUN SERPL-MCNC: 13.5 MG/DL — SIGNIFICANT CHANGE UP (ref 8–20)
CALCIUM SERPL-MCNC: 8.7 MG/DL — SIGNIFICANT CHANGE UP (ref 8.4–10.5)
CHLORIDE SERPL-SCNC: 104 MMOL/L — SIGNIFICANT CHANGE UP (ref 96–108)
CK SERPL-CCNC: 46 U/L — SIGNIFICANT CHANGE UP (ref 25–170)
CO2 SERPL-SCNC: 25 MMOL/L — SIGNIFICANT CHANGE UP (ref 22–29)
CREAT SERPL-MCNC: 0.76 MG/DL — SIGNIFICANT CHANGE UP (ref 0.5–1.3)
EGFR: 84 ML/MIN/1.73M2 — SIGNIFICANT CHANGE UP
GLUCOSE SERPL-MCNC: 105 MG/DL — HIGH (ref 70–99)
HCT VFR BLD CALC: 26.5 % — LOW (ref 34.5–45)
HGB BLD-MCNC: 8.9 G/DL — LOW (ref 11.5–15.5)
MAGNESIUM SERPL-MCNC: 1.4 MG/DL — LOW (ref 1.6–2.6)
MCHC RBC-ENTMCNC: 33.1 PG — SIGNIFICANT CHANGE UP (ref 27–34)
MCHC RBC-ENTMCNC: 33.6 G/DL — SIGNIFICANT CHANGE UP (ref 32–36)
MCV RBC AUTO: 98.5 FL — SIGNIFICANT CHANGE UP (ref 80–100)
PHOSPHATE SERPL-MCNC: 3.5 MG/DL — SIGNIFICANT CHANGE UP (ref 2.4–4.7)
PLATELET # BLD AUTO: 85 K/UL — LOW (ref 150–400)
POTASSIUM SERPL-MCNC: 4.3 MMOL/L — SIGNIFICANT CHANGE UP (ref 3.5–5.3)
POTASSIUM SERPL-SCNC: 4.3 MMOL/L — SIGNIFICANT CHANGE UP (ref 3.5–5.3)
RBC # BLD: 2.69 M/UL — LOW (ref 3.8–5.2)
RBC # FLD: 22.1 % — HIGH (ref 10.3–14.5)
SODIUM SERPL-SCNC: 141 MMOL/L — SIGNIFICANT CHANGE UP (ref 135–145)
TROPONIN T, HIGH SENSITIVITY RESULT: 82 NG/L — HIGH (ref 0–51)
WBC # BLD: 4.06 K/UL — SIGNIFICANT CHANGE UP (ref 3.8–10.5)
WBC # FLD AUTO: 4.06 K/UL — SIGNIFICANT CHANGE UP (ref 3.8–10.5)

## 2024-12-29 PROCEDURE — 80048 BASIC METABOLIC PNL TOTAL CA: CPT

## 2024-12-29 PROCEDURE — 83880 ASSAY OF NATRIURETIC PEPTIDE: CPT

## 2024-12-29 PROCEDURE — 82550 ASSAY OF CK (CPK): CPT

## 2024-12-29 PROCEDURE — 85027 COMPLETE CBC AUTOMATED: CPT

## 2024-12-29 PROCEDURE — 0241U: CPT

## 2024-12-29 PROCEDURE — 94640 AIRWAY INHALATION TREATMENT: CPT

## 2024-12-29 PROCEDURE — 83735 ASSAY OF MAGNESIUM: CPT

## 2024-12-29 PROCEDURE — 84484 ASSAY OF TROPONIN QUANT: CPT

## 2024-12-29 PROCEDURE — 93005 ELECTROCARDIOGRAM TRACING: CPT

## 2024-12-29 PROCEDURE — 87637 SARSCOV2&INF A&B&RSV AMP PRB: CPT

## 2024-12-29 PROCEDURE — 71046 X-RAY EXAM CHEST 2 VIEWS: CPT

## 2024-12-29 PROCEDURE — 93306 TTE W/DOPPLER COMPLETE: CPT

## 2024-12-29 PROCEDURE — 71275 CT ANGIOGRAPHY CHEST: CPT | Mod: MC

## 2024-12-29 PROCEDURE — 36415 COLL VENOUS BLD VENIPUNCTURE: CPT

## 2024-12-29 PROCEDURE — 93010 ELECTROCARDIOGRAM REPORT: CPT

## 2024-12-29 PROCEDURE — 84100 ASSAY OF PHOSPHORUS: CPT

## 2024-12-29 PROCEDURE — 80053 COMPREHEN METABOLIC PANEL: CPT

## 2024-12-29 PROCEDURE — 85025 COMPLETE CBC W/AUTO DIFF WBC: CPT

## 2024-12-29 PROCEDURE — 99239 HOSP IP/OBS DSCHRG MGMT >30: CPT

## 2024-12-29 PROCEDURE — 93306 TTE W/DOPPLER COMPLETE: CPT | Mod: 26

## 2024-12-29 PROCEDURE — 99285 EMERGENCY DEPT VISIT HI MDM: CPT

## 2024-12-29 RX ORDER — ALPRAZOLAM 0.25 MG/1
1 TABLET ORAL
Qty: 10 | Refills: 0
Start: 2024-12-29 | End: 2025-01-02

## 2024-12-29 RX ORDER — MAGNESIUM SULFATE 500 MG/ML
2 INJECTION, SOLUTION INTRAMUSCULAR; INTRAVENOUS ONCE
Refills: 0 | Status: COMPLETED | OUTPATIENT
Start: 2024-12-29 | End: 2024-12-29

## 2024-12-29 RX ORDER — NITROGLYCERIN 20.8 MG/1
1 FILM, EXTENDED RELEASE TRANSDERMAL
Qty: 1 | Refills: 0
Start: 2024-12-29 | End: 2025-01-12

## 2024-12-29 RX ORDER — INFLUENZA A VIRUS A/WISCONSIN/588/2019 (H1N1) RECOMBINANT HEMAGGLUTININ ANTIGEN, INFLUENZA A VIRUS A/DARWIN/6/2021 (H3N2) RECOMBINANT HEMAGGLUTININ ANTIGEN, INFLUENZA B VIRUS B/AUSTRIA/1359417/2021 RECOMBINANT HEMAGGLUTININ ANTIGEN, AND INFLUENZA B VIRUS B/PHUKET/3073/2013 RECOMBINANT HEMAGGLUTININ ANTIGEN 45; 45; 45; 45 UG/.5ML; UG/.5ML; UG/.5ML; UG/.5ML
0.5 INJECTION INTRAMUSCULAR ONCE
Refills: 0 | Status: DISCONTINUED | OUTPATIENT
Start: 2024-12-29 | End: 2024-12-29

## 2024-12-29 RX ORDER — FUROSEMIDE 20 MG
1 TABLET ORAL
Qty: 30 | Refills: 0
Start: 2024-12-29 | End: 2025-01-27

## 2024-12-29 RX ADMIN — NICOTINE POLACRILEX 1 PATCH: 4 LOZENGE ORAL at 12:27

## 2024-12-29 RX ADMIN — SPIRONOLACTONE 12.5 MILLIGRAM(S): 50 TABLET ORAL at 05:27

## 2024-12-29 RX ADMIN — Medication 40 MILLIGRAM(S): at 05:26

## 2024-12-29 RX ADMIN — MAGNESIUM SULFATE 25 GRAM(S): 500 INJECTION, SOLUTION INTRAMUSCULAR; INTRAVENOUS at 08:12

## 2024-12-29 RX ADMIN — LOSARTAN POTASSIUM 12.5 MILLIGRAM(S): 100 TABLET, FILM COATED ORAL at 05:27

## 2024-12-29 RX ADMIN — ALPRAZOLAM 0.25 MILLIGRAM(S): 0.25 TABLET ORAL at 14:37

## 2024-12-29 RX ADMIN — ENOXAPARIN SODIUM 40 MILLIGRAM(S): 60 INJECTION INTRAVENOUS; SUBCUTANEOUS at 12:27

## 2024-12-29 RX ADMIN — TIOTROPIUM BROMIDE MONOHYDRATE 2 PUFF(S): 18 CAPSULE ORAL; RESPIRATORY (INHALATION) at 08:43

## 2024-12-29 RX ADMIN — ALBUTEROL SULFATE 2 PUFF(S): 90 INHALANT RESPIRATORY (INHALATION) at 08:43

## 2024-12-29 RX ADMIN — CARVEDILOL 6.25 MILLIGRAM(S): 25 TABLET, FILM COATED ORAL at 05:27

## 2024-12-29 NOTE — DISCHARGE NOTE PROVIDER - ATTENDING DISCHARGE PHYSICAL EXAMINATION:
CONSTITUTIONAL: no apparent distress  EYES: PERRLA and symmetric, EOMI  ENMT: Oral mucosa with moist membranes. Normal dentition; no pharyngeal injection or exudates  RESP: No respiratory distress, CTABL   CV: RRR, +S1S2, no MRG; no JVD; no peripheral edema  GI: Soft, NT, ND, no rebound, no guarding  NEURO: CN II-XII intact; sensation intact in upper and lower extremities b/l to light touch   PSYCH: A+O x 3, flat affect, calm, uncooperative with physical exam

## 2024-12-29 NOTE — DISCHARGE NOTE PROVIDER - NSDCCPCAREPLAN_GEN_ALL_CORE_FT
PRINCIPAL DISCHARGE DIAGNOSIS  Diagnosis: Acute on chronic systolic heart failure  Assessment and Plan of Treatment: EF mildly improved from prior examination, small pericardial effusion without tamponade   Seen by Cardiology, improved on IV diuresis, now changed to PO   EKG and elevated Troponin reviewed by Cardiology and cleared, likely due to demand ischemia / vasopasm   Can continue on as needed NTG for chest discomfort with anxiety control   continue to follow closely with cardiology in office      SECONDARY DISCHARGE DIAGNOSES  Diagnosis: Troponin level elevated  Assessment and Plan of Treatment: as above / demand ischemia vs vasospasm   continue close follow up with cardiology    Diagnosis: Chronic obstructive pulmonary disease (COPD)  Assessment and Plan of Treatment: continue medications as prescribed prior to admission   continue to follow with pulm outpatient    Diagnosis: Metastatic cancer  Assessment and Plan of Treatment: continue to follow with oncology / gyn / oncology   recommend further symptom control from oncologist / PCP, patient will have difficulty attending multiple appointments / seeing psych outpatient

## 2024-12-29 NOTE — PATIENT PROFILE ADULT - NS PRO AD NO ADVANCE DIRECTIVE
[FreeTextEntry1] : Pelvic Pain, Recurrent UTI's , Benign Clinical Exam  Inferior migration of a ParaGard IUD -Removed IUD sent for culture  -Urine cx sent -Wants to see how she does w/o IUD for now, will use Condoms -Rv  for annual exam  No

## 2024-12-29 NOTE — DISCHARGE NOTE PROVIDER - CARE PROVIDER_API CALL
Gildardo Dinh  Interventional Cardiology  [address], [address]  [place] [address]  Phone: [phone]  Fax: [phone]  Follow Up Time: 1 week    Huma Jennings  Gynecologic Oncology  [address] [place] [id number]  Phone: [phone]  Fax: [phone]  Follow Up Time: 2 weeks

## 2024-12-29 NOTE — PATIENT PROFILE ADULT - FUNCTIONAL ASSESSMENT - DAILY ACTIVITY ASSESSMENT TYPE
HEARING AIDS:  Fitting Date: 11-28-18  Payor/Insurance: [facility]  T-19 Auth: [id number]  Make/Model: [employer] Pro BTE 13     Left: [id number]      Right: [id number]  Service Plan: T-19  Color//Dome: black, trs tube with full shell earmold no vent  [id number] L  9-13-21  [id number] R  9-13-21  Battery Size: 13  /Warranty:10/14/22 RE only  Loss/Damage:12-06-21  Programmers: fitting link  Supplies: trs tubing    Hearing aid check out of warranty    She was seen for earmolds today but unable to do impressions as both ears are impacted with cerumen. Attempted to remove but most of it is pretty hardened. Will get her scheduled with Dr. Ross for cerumen removal and to see me after for new molds. Changed tubing on her current molds as they are causing her pain since they are so hardened. Showed mom how to change tubing so they don't have to come back as often. Gave her 4 tubes.     Will see her back following cleaning.   
Admission

## 2024-12-29 NOTE — DISCHARGE NOTE PROVIDER - CARE PROVIDERS DIRECT ADDRESSES
[email or web address]

## 2024-12-29 NOTE — DISCHARGE NOTE NURSING/CASE MANAGEMENT/SOCIAL WORK - NSDCPEFALRISK_GEN_ALL_CORE
For information on Fall & Injury Prevention, visit: [email or web address] OR  [email or web address] OR  https://www.cdc.gov/steadi/patient.html

## 2024-12-29 NOTE — DISCHARGE NOTE PROVIDER - NSDCMRMEDTOKEN_GEN_ALL_CORE_FT
acetaminophen 325 mg oral tablet: 2 tab(s) orally every 6 hours As needed Temp greater or equal to 38C (100.4F), Mild Pain (1 - 3)  albuterol 90 mcg/inh inhalation aerosol: 2 puff(s) inhaled every 6 hours as needed for  shortness of breath and/or wheezing  ALPRAZolam 0.25 mg oral tablet: 1 tab(s) orally 2 times a day as needed for as needed for anxiety MDD: 2 tablets  Coreg 6.25 mg oral tablet: 1 tab(s) orally every 12 hours  Lasix 40 mg oral tablet: 1 tab(s) orally once a day Take Lasix 40 mg daily until follow up with Cardiology  losartan 25 mg oral tablet: 0.5 tab(s) orally once a day  nitroglycerin 0.3 mg sublingual tablet: 1 tab(s) sublingually 3 times a day as needed for  chest pain may use .3 mg SL NTG every 5 minutes as needed for chest pain up to 3 doses  predniSONE 10 mg oral tablet: 1 tab(s) orally once a day Please take 4 tabs daily x 3 days then 3 tabs daily x 3 days then 2 tabs daily x 3 days then 1 tab daily x 3 days  spironolactone 25 mg oral tablet: 0.5 tab(s) orally once a day  Symbicort 160 mcg-4.5 mcg/inh inhalation aerosol: 2 puff(s) inhaled 2 times a day  tiotropium 2.5 mcg/inh inhalation aerosol: 2 puff(s) inhaled once a day  Zithromax 250 mg oral tablet: 1 tab(s) orally once a day

## 2024-12-29 NOTE — DISCHARGE NOTE PROVIDER - PROVIDER TOKENS
PROVIDER:[TOKEN:[[id number]],FOLLOWUP:[1 week]],PROVIDER:[TOKEN:[[id number]],FOLLOWUP:[2 weeks]]

## 2024-12-29 NOTE — DISCHARGE NOTE NURSING/CASE MANAGEMENT/SOCIAL WORK - FINANCIAL ASSISTANCE
[facility] provides services at a reduced cost to those who are determined to be eligible through [facility]’s financial assistance program. Information regarding [facility]’s financial assistance program can be found by going to [email or web address] or by calling [phone].

## 2024-12-29 NOTE — DISCHARGE NOTE PROVIDER - NSDCFUSCHEDAPPT_GEN_ALL_CORE_FT
Huma Jennings  [facility]  [facility] [address]  Scheduled Appointment: 12/31/2024    [facility]  [facility]  Scheduled Appointment: 12/31/2024    Frantz Martinez  [facility]  [facility] [address]  Scheduled Appointment: 01/06/2025    Gildardo Dnih  [facility]  CARDIOLOGY [address]  Scheduled Appointment: 02/12/2025    Gildardo Dinh  [facility]  CARDIOLOGY [address]  Scheduled Appointment: 02/25/2025

## 2024-12-29 NOTE — PATIENT PROFILE ADULT - FALL HARM RISK - UNIVERSAL INTERVENTIONS
25
Bed in lowest position, wheels locked, appropriate side rails in place/Call bell, personal items and telephone in reach/Instruct patient to call for assistance before getting out of bed or chair/Non-slip footwear when patient is out of bed/[place] to call system/Physically safe environment - no spills, clutter or unnecessary equipment/Purposeful Proactive Rounding/Room/bathroom lighting operational, light cord in reach

## 2024-12-29 NOTE — CHART NOTE - NSCHARTNOTEFT_GEN_A_CORE
RN reported critical EKG   pt noted to have prior EKG abnormality and elevated troponin cardiology following and ordered the EKG for the AM  Pt seen at bedside NO ACUTE DISTRESS, no chest pain, no SOB, denies Dizziness states she just feels a little funny       PHYSICAL EXAM:  GENERAL: NAD, lying in bed comfortably sleeping awakens easily   HEAD:  Atraumatic, Normocephalic  EYES: EOMI, PERRL, conjunctiva and sclera clear  ENT: Moist mucous membranes  NECK: Supple, No JVD  CHEST/LUNG: Clear to auscultation bilaterally; No rales, rhonchi, wheezing, or rubs. Unlabored respirations  HEART: Regular rate and rhythm; No murmurs, rubs, or gallops  ABDOMEN: Bowel sounds present; Soft, Nontender, Nondistended   EXTREMITIES:  2+ Peripheral Pulses, brisk capillary refill. No clubbing, cyanosis, or edema    Vital Signs Last 24 Hrs  T(C): 36.6 (29 Dec 2024 04:52), Max: 36.9 (28 Dec 2024 10:36)  T(F): 97.9 (29 Dec 2024 04:52), Max: 98.5 (28 Dec 2024 10:36)  HR: 89 (29 Dec 2024 04:52) (80 - 103)  BP: 110/67 (29 Dec 2024 04:52) (100/61 - 128/68)  BP(mean): --  RR: 18 (29 Dec 2024 04:52) (13 - 20)  SpO2: 98% (29 Dec 2024 04:52) (97% - 100%)    Parameters below as of 29 Dec 2024 04:52  Patient On (Oxygen Delivery Method): nasal cannula  O2 Flow (L/min): 2      Plan:  Stat cardiac enzymes ordered  Mag 2grams ordered  pending MARYAM  Call placed to Cardiac service to update

## 2024-12-29 NOTE — PROVIDER CONTACT NOTE (OTHER) - ASSESSMENT
Pt denies chest pain, c/o of slight SOB however VS remain stable as shown in flowsheet and pt remains on room air.

## 2024-12-29 NOTE — DISCHARGE NOTE NURSING/CASE MANAGEMENT/SOCIAL WORK - PATIENT PORTAL LINK FT
You can access the FollowMyHealth Patient Portal offered by [facility] by registering at the following website: [email or web address]. By joining [employer]’s FollowMyHealth portal, you will also be able to view your health information using other applications (apps) compatible with our system.

## 2024-12-29 NOTE — DISCHARGE NOTE PROVIDER - HOSPITAL COURSE
Mrs. 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 She got her last session of chemotherapy about 2 weeks ago. Admitted with acute hypoxic respiratory failure in the setting of likely acute on chronic HFrEF exacerbation, vs. coronary vasopasm. Seen by cardiology in ED with recommendation for IV diuresis, seen as well by gyn/ onc team with no acute intervention needed, completed neoadjuvant chemo (with last treatment 2 weeks ago as noted) with plan for potential surgical intervention in near future (hysterectomy /oophorectomy per patient). In AM 12/29, patient weened to RA, feels well, appears euvolemic, noted mild chest discomfort early in AM, repeat EKG and Trp ordered. Discussed at length with Cardiology Dr. Virgen who reviewed EKG, states abnormal with signs of ischemia, however not new for patient in setting of her known HFrEF, Trp trended down, ruled in setting of demand ischemia from anemia /HFrEF and potentially vasospasm. As patient taken for Cardiac Cath recently no intervention offered this admission. Patient to start back on PO diuresis with recommendation to control anxiety and for PRN NTG for chest discomfort. Home O2 eval performed without need for supplemental oxygen, patient is currently medically stable for discharge home at this time with close outpatient cardiology, Gyn/Onc and Oncology follow up as well as Pulmonology and PCP.

## 2024-12-30 ENCOUNTER — APPOINTMENT (OUTPATIENT)
Age: 69
End: 2024-12-30

## 2024-12-31 ENCOUNTER — RESULT REVIEW (OUTPATIENT)
Age: 69
End: 2024-12-31

## 2024-12-31 ENCOUNTER — NON-APPOINTMENT (OUTPATIENT)
Age: 69
End: 2024-12-31

## 2024-12-31 ENCOUNTER — APPOINTMENT (OUTPATIENT)
Dept: HEMATOLOGY ONCOLOGY | Facility: CLINIC | Age: 69
End: 2024-12-31

## 2024-12-31 ENCOUNTER — APPOINTMENT (OUTPATIENT)
Dept: GYNECOLOGIC ONCOLOGY | Facility: CLINIC | Age: 69
End: 2024-12-31
Payer: MEDICARE

## 2024-12-31 VITALS
TEMPERATURE: 96 F | SYSTOLIC BLOOD PRESSURE: 112 MMHG | DIASTOLIC BLOOD PRESSURE: 72 MMHG | WEIGHT: 105 LBS | HEART RATE: 99 BPM | BODY MASS INDEX: 19.32 KG/M2 | OXYGEN SATURATION: 99 % | HEIGHT: 62 IN

## 2024-12-31 PROCEDURE — 99214 OFFICE O/P EST MOD 30 MIN: CPT

## 2024-12-31 PROCEDURE — G2211 COMPLEX E/M VISIT ADD ON: CPT

## 2025-01-01 ENCOUNTER — APPOINTMENT (OUTPATIENT)
Dept: INTERNAL MEDICINE | Facility: CLINIC | Age: 70
End: 2025-01-01
Payer: MEDICARE

## 2025-01-01 ENCOUNTER — RESULT REVIEW (OUTPATIENT)
Age: 70
End: 2025-01-01

## 2025-01-01 ENCOUNTER — TRANSCRIPTION ENCOUNTER (OUTPATIENT)
Age: 70
End: 2025-01-01

## 2025-01-01 ENCOUNTER — APPOINTMENT (OUTPATIENT)
Dept: PULMONOLOGY | Facility: CLINIC | Age: 70
End: 2025-01-01
Payer: MEDICARE

## 2025-01-01 ENCOUNTER — NON-APPOINTMENT (OUTPATIENT)
Age: 70
End: 2025-01-01

## 2025-01-01 ENCOUNTER — OUTPATIENT (OUTPATIENT)
Dept: OUTPATIENT SERVICES | Facility: HOSPITAL | Age: 70
LOS: 1 days | End: 2025-01-01
Payer: COMMERCIAL

## 2025-01-01 ENCOUNTER — APPOINTMENT (OUTPATIENT)
Dept: CARDIOLOGY | Facility: CLINIC | Age: 70
End: 2025-01-01
Payer: MEDICARE

## 2025-01-01 VITALS — BODY MASS INDEX: 19.32 KG/M2 | WEIGHT: 105 LBS | HEIGHT: 62 IN

## 2025-01-01 VITALS
SYSTOLIC BLOOD PRESSURE: 104 MMHG | DIASTOLIC BLOOD PRESSURE: 62 MMHG | RESPIRATION RATE: 18 BRPM | OXYGEN SATURATION: 98 % | TEMPERATURE: 97 F | HEART RATE: 67 BPM | HEIGHT: 64 IN | WEIGHT: 103.62 LBS

## 2025-01-01 VITALS
OXYGEN SATURATION: 99 % | WEIGHT: 104 LBS | TEMPERATURE: 95.5 F | RESPIRATION RATE: 16 BRPM | SYSTOLIC BLOOD PRESSURE: 115 MMHG | BODY MASS INDEX: 19.14 KG/M2 | DIASTOLIC BLOOD PRESSURE: 70 MMHG | HEART RATE: 83 BPM | HEIGHT: 62 IN

## 2025-01-01 VITALS
HEIGHT: 62 IN | DIASTOLIC BLOOD PRESSURE: 57 MMHG | OXYGEN SATURATION: 100 % | WEIGHT: 104 LBS | HEART RATE: 54 BPM | BODY MASS INDEX: 19.14 KG/M2 | SYSTOLIC BLOOD PRESSURE: 124 MMHG

## 2025-01-01 VITALS — SYSTOLIC BLOOD PRESSURE: 112 MMHG | DIASTOLIC BLOOD PRESSURE: 62 MMHG | RESPIRATION RATE: 16 BRPM

## 2025-01-01 DIAGNOSIS — F17.210 NICOTINE DEPENDENCE, CIGARETTES, UNCOMPLICATED: ICD-10-CM

## 2025-01-01 DIAGNOSIS — Z98.890 OTHER SPECIFIED POSTPROCEDURAL STATES: Chronic | ICD-10-CM

## 2025-01-01 DIAGNOSIS — J43.2 CENTRILOBULAR EMPHYSEMA: ICD-10-CM

## 2025-01-01 DIAGNOSIS — I50.9 HEART FAILURE, UNSPECIFIED: ICD-10-CM

## 2025-01-01 DIAGNOSIS — I50.20 UNSPECIFIED SYSTOLIC (CONGESTIVE) HEART FAILURE: ICD-10-CM

## 2025-01-01 DIAGNOSIS — Z01.811 ENCOUNTER FOR PREPROCEDURAL RESPIRATORY EXAMINATION: ICD-10-CM

## 2025-01-01 DIAGNOSIS — Z01.810 ENCOUNTER FOR PREPROCEDURAL CARDIOVASCULAR EXAMINATION: ICD-10-CM

## 2025-01-01 DIAGNOSIS — C56.9 MALIGNANT NEOPLASM OF UNSPECIFIED OVARY: ICD-10-CM

## 2025-01-01 DIAGNOSIS — Z00.00 ENCOUNTER FOR GENERAL ADULT MEDICAL EXAMINATION W/OUT ABNORMAL FINDINGS: ICD-10-CM

## 2025-01-01 DIAGNOSIS — Z01.818 ENCOUNTER FOR OTHER PREPROCEDURAL EXAMINATION: ICD-10-CM

## 2025-01-01 DIAGNOSIS — E87.6 HYPOKALEMIA: ICD-10-CM

## 2025-01-01 DIAGNOSIS — Z29.9 ENCOUNTER FOR PROPHYLACTIC MEASURES, UNSPECIFIED: ICD-10-CM

## 2025-01-01 LAB
A1C WITH ESTIMATED AVERAGE GLUCOSE RESULT: 5.4 % — SIGNIFICANT CHANGE UP (ref 4–5.6)
ALBUMIN SERPL ELPH-MCNC: 4.3 G/DL
ALP BLD-CCNC: 80 U/L
ALT SERPL-CCNC: 16 U/L
ANION GAP SERPL CALC-SCNC: 11 MMOL/L — SIGNIFICANT CHANGE UP (ref 5–17)
ANION GAP SERPL CALC-SCNC: 14 MMOL/L
ANION GAP SERPL CALC-SCNC: 17 MMOL/L
APTT BLD: 27 SEC — SIGNIFICANT CHANGE UP (ref 24.5–35.6)
AST SERPL-CCNC: 19 U/L
BASOPHILS # BLD AUTO: 0.04 K/UL — SIGNIFICANT CHANGE UP (ref 0–0.2)
BASOPHILS NFR BLD AUTO: 0.6 % — SIGNIFICANT CHANGE UP (ref 0–2)
BILIRUB SERPL-MCNC: 0.4 MG/DL
BLD GP AB SCN SERPL QL: SIGNIFICANT CHANGE UP
BUN SERPL-MCNC: 11.2 MG/DL — SIGNIFICANT CHANGE UP (ref 8–20)
BUN SERPL-MCNC: 12 MG/DL
BUN SERPL-MCNC: 17 MG/DL
CALCIUM SERPL-MCNC: 9.2 MG/DL
CALCIUM SERPL-MCNC: 9.4 MG/DL — SIGNIFICANT CHANGE UP (ref 8.4–10.5)
CALCIUM SERPL-MCNC: 9.9 MG/DL
CHLORIDE SERPL-SCNC: 87 MMOL/L
CHLORIDE SERPL-SCNC: 91 MMOL/L — LOW (ref 96–108)
CHLORIDE SERPL-SCNC: 95 MMOL/L
CO2 SERPL-SCNC: 26 MMOL/L
CO2 SERPL-SCNC: 27 MMOL/L
CO2 SERPL-SCNC: 32 MMOL/L — HIGH (ref 22–29)
COMMENT - BLOOD BANK: SIGNIFICANT CHANGE UP
CREAT SERPL-MCNC: 0.69 MG/DL — SIGNIFICANT CHANGE UP (ref 0.5–1.3)
CREAT SERPL-MCNC: 0.8 MG/DL
CREAT SERPL-MCNC: 0.87 MG/DL
EGFR: 71 ML/MIN/1.73M2
EGFR: 79 ML/MIN/1.73M2
EGFR: 93 ML/MIN/1.73M2 — SIGNIFICANT CHANGE UP
EOSINOPHIL # BLD AUTO: 0.11 K/UL — SIGNIFICANT CHANGE UP (ref 0–0.5)
EOSINOPHIL NFR BLD AUTO: 1.6 % — SIGNIFICANT CHANGE UP (ref 0–6)
ESTIMATED AVERAGE GLUCOSE: 108 MG/DL — SIGNIFICANT CHANGE UP (ref 68–114)
GLUCOSE SERPL-MCNC: 144 MG/DL
GLUCOSE SERPL-MCNC: 147 MG/DL
GLUCOSE SERPL-MCNC: 99 MG/DL — SIGNIFICANT CHANGE UP (ref 70–99)
HCT VFR BLD CALC: 34.3 % — LOW (ref 34.5–45)
HGB BLD-MCNC: 11.5 G/DL — SIGNIFICANT CHANGE UP (ref 11.5–15.5)
IMM GRANULOCYTES NFR BLD AUTO: 0.4 % — SIGNIFICANT CHANGE UP (ref 0–0.9)
INR BLD: 1 RATIO — SIGNIFICANT CHANGE UP (ref 0.85–1.16)
LYMPHOCYTES # BLD AUTO: 1.68 K/UL — SIGNIFICANT CHANGE UP (ref 1–3.3)
LYMPHOCYTES # BLD AUTO: 24.9 % — SIGNIFICANT CHANGE UP (ref 13–44)
MAGNESIUM SERPL-MCNC: 1.5 MG/DL
MCHC RBC-ENTMCNC: 33.5 G/DL — SIGNIFICANT CHANGE UP (ref 32–36)
MCHC RBC-ENTMCNC: 34.1 PG — HIGH (ref 27–34)
MCV RBC AUTO: 101.8 FL — HIGH (ref 80–100)
MONOCYTES # BLD AUTO: 0.5 K/UL — SIGNIFICANT CHANGE UP (ref 0–0.9)
MONOCYTES NFR BLD AUTO: 7.4 % — SIGNIFICANT CHANGE UP (ref 2–14)
NEUTROPHILS # BLD AUTO: 4.38 K/UL — SIGNIFICANT CHANGE UP (ref 1.8–7.4)
NEUTROPHILS NFR BLD AUTO: 65.1 % — SIGNIFICANT CHANGE UP (ref 43–77)
PHOSPHATE SERPL-MCNC: 2.7 MG/DL — SIGNIFICANT CHANGE UP (ref 2.4–4.7)
PLATELET # BLD AUTO: 287 K/UL — SIGNIFICANT CHANGE UP (ref 150–400)
POTASSIUM SERPL-MCNC: 3.3 MMOL/L — LOW (ref 3.5–5.3)
POTASSIUM SERPL-SCNC: 2.9 MMOL/L
POTASSIUM SERPL-SCNC: 3.3 MMOL/L — LOW (ref 3.5–5.3)
POTASSIUM SERPL-SCNC: 4.2 MMOL/L
PROT SERPL-MCNC: 7 G/DL
PROTHROM AB SERPL-ACNC: 11.3 SEC — SIGNIFICANT CHANGE UP (ref 9.9–13.4)
RBC # BLD: 3.37 M/UL — LOW (ref 3.8–5.2)
RBC # FLD: 16 % — HIGH (ref 10.3–14.5)
SODIUM SERPL-SCNC: 130 MMOL/L
SODIUM SERPL-SCNC: 134 MMOL/L — LOW (ref 135–145)
SODIUM SERPL-SCNC: 137 MMOL/L
WBC # BLD: 6.74 K/UL — SIGNIFICANT CHANGE UP (ref 3.8–10.5)
WBC # FLD AUTO: 6.74 K/UL — SIGNIFICANT CHANGE UP (ref 3.8–10.5)

## 2025-01-01 PROCEDURE — G2211 COMPLEX E/M VISIT ADD ON: CPT

## 2025-01-01 PROCEDURE — G2211 COMPLEX E/M VISIT ADD ON: CPT | Mod: GC

## 2025-01-01 PROCEDURE — 99406 BEHAV CHNG SMOKING 3-10 MIN: CPT

## 2025-01-01 PROCEDURE — 71046 X-RAY EXAM CHEST 2 VIEWS: CPT | Mod: 26

## 2025-01-01 PROCEDURE — 83036 HEMOGLOBIN GLYCOSYLATED A1C: CPT

## 2025-01-01 PROCEDURE — 99214 OFFICE O/P EST MOD 30 MIN: CPT

## 2025-01-01 PROCEDURE — 85730 THROMBOPLASTIN TIME PARTIAL: CPT

## 2025-01-01 PROCEDURE — 80048 BASIC METABOLIC PNL TOTAL CA: CPT

## 2025-01-01 PROCEDURE — 36415 COLL VENOUS BLD VENIPUNCTURE: CPT

## 2025-01-01 PROCEDURE — 94010 BREATHING CAPACITY TEST: CPT

## 2025-01-01 PROCEDURE — 85025 COMPLETE CBC W/AUTO DIFF WBC: CPT

## 2025-01-01 PROCEDURE — 86850 RBC ANTIBODY SCREEN: CPT

## 2025-01-01 PROCEDURE — 99214 OFFICE O/P EST MOD 30 MIN: CPT | Mod: 25

## 2025-01-01 PROCEDURE — 85610 PROTHROMBIN TIME: CPT

## 2025-01-01 PROCEDURE — 93010 ELECTROCARDIOGRAM REPORT: CPT

## 2025-01-01 PROCEDURE — 84100 ASSAY OF PHOSPHORUS: CPT

## 2025-01-01 PROCEDURE — 99214 OFFICE O/P EST MOD 30 MIN: CPT | Mod: GC

## 2025-01-01 PROCEDURE — G0463: CPT

## 2025-01-01 PROCEDURE — 86900 BLOOD TYPING SEROLOGIC ABO: CPT

## 2025-01-01 PROCEDURE — 93005 ELECTROCARDIOGRAM TRACING: CPT

## 2025-01-01 PROCEDURE — 86901 BLOOD TYPING SEROLOGIC RH(D): CPT

## 2025-01-01 PROCEDURE — 71046 X-RAY EXAM CHEST 2 VIEWS: CPT

## 2025-01-01 RX ORDER — BLOOD PRESSURE TEST KIT-LARGE
KIT MISCELLANEOUS
Qty: 1 | Refills: 0 | Status: ACTIVE | COMMUNITY
Start: 2025-01-01 | End: 1900-01-01

## 2025-01-01 RX ORDER — PROCHLORPERAZINE MALEATE 5 MG/1
1 TABLET ORAL
Refills: 0 | DISCHARGE

## 2025-01-01 RX ORDER — POTASSIUM CHLORIDE 1500 MG/1
20 TABLET, FILM COATED, EXTENDED RELEASE ORAL TWICE DAILY
Qty: 4 | Refills: 0 | Status: ACTIVE | COMMUNITY
Start: 2025-01-01 | End: 1900-01-01

## 2025-01-01 RX ORDER — UMECLIDINIUM BROMIDE AND VILANTEROL TRIFENATATE 62.5; 25 UG/1; UG/1
62.5-25 POWDER RESPIRATORY (INHALATION)
Qty: 3 | Refills: 3 | Status: ACTIVE | COMMUNITY
Start: 2025-01-01 | End: 1900-01-01

## 2025-01-01 RX ORDER — ESCITALOPRAM 10 MG/1
2 TABLET, FILM COATED ORAL
Refills: 0 | DISCHARGE

## 2025-01-02 LAB — CANCER AG125 SERPL-ACNC: 56 U/ML

## 2025-01-02 RX ORDER — ESCITALOPRAM OXALATE 5 MG/1
5 TABLET ORAL DAILY
Qty: 14 | Refills: 5 | Status: ACTIVE | COMMUNITY
Start: 2025-01-02 | End: 1900-01-01

## 2025-01-03 ENCOUNTER — RESULT REVIEW (OUTPATIENT)
Age: 70
End: 2025-01-03

## 2025-01-06 ENCOUNTER — APPOINTMENT (OUTPATIENT)
Dept: CT IMAGING | Facility: CLINIC | Age: 70
End: 2025-01-06
Payer: MEDICARE

## 2025-01-06 ENCOUNTER — OUTPATIENT (OUTPATIENT)
Dept: OUTPATIENT SERVICES | Facility: HOSPITAL | Age: 70
LOS: 1 days | End: 2025-01-06

## 2025-01-06 DIAGNOSIS — Z98.890 OTHER SPECIFIED POSTPROCEDURAL STATES: Chronic | ICD-10-CM

## 2025-01-06 DIAGNOSIS — Z00.8 ENCOUNTER FOR OTHER GENERAL EXAMINATION: ICD-10-CM

## 2025-01-06 PROCEDURE — 74177 CT ABD & PELVIS W/CONTRAST: CPT | Mod: 26

## 2025-01-07 ENCOUNTER — APPOINTMENT (OUTPATIENT)
Dept: CARDIOLOGY | Facility: CLINIC | Age: 70
End: 2025-01-07

## 2025-01-10 ENCOUNTER — RX RENEWAL (OUTPATIENT)
Age: 70
End: 2025-01-10

## 2025-01-13 ENCOUNTER — APPOINTMENT (OUTPATIENT)
Dept: GYNECOLOGIC ONCOLOGY | Facility: CLINIC | Age: 70
End: 2025-01-13
Payer: MEDICARE

## 2025-01-13 VITALS
RESPIRATION RATE: 16 BRPM | WEIGHT: 105 LBS | HEART RATE: 53 BPM | OXYGEN SATURATION: 92 % | BODY MASS INDEX: 19.32 KG/M2 | HEIGHT: 62 IN | SYSTOLIC BLOOD PRESSURE: 110 MMHG | DIASTOLIC BLOOD PRESSURE: 70 MMHG

## 2025-01-13 PROCEDURE — 99214 OFFICE O/P EST MOD 30 MIN: CPT

## 2025-01-13 PROCEDURE — G2211 COMPLEX E/M VISIT ADD ON: CPT

## 2025-01-14 ENCOUNTER — APPOINTMENT (OUTPATIENT)
Dept: HEMATOLOGY ONCOLOGY | Facility: CLINIC | Age: 70
End: 2025-01-14

## 2025-01-14 ENCOUNTER — APPOINTMENT (OUTPATIENT)
Dept: GYNECOLOGIC ONCOLOGY | Facility: CLINIC | Age: 70
End: 2025-01-14
Payer: MEDICARE

## 2025-01-14 VITALS
OXYGEN SATURATION: 95 % | DIASTOLIC BLOOD PRESSURE: 67 MMHG | HEIGHT: 62 IN | WEIGHT: 104.5 LBS | SYSTOLIC BLOOD PRESSURE: 114 MMHG | HEART RATE: 90 BPM | BODY MASS INDEX: 19.23 KG/M2

## 2025-01-14 PROCEDURE — G2211 COMPLEX E/M VISIT ADD ON: CPT

## 2025-01-14 PROCEDURE — 99214 OFFICE O/P EST MOD 30 MIN: CPT

## 2025-01-15 ENCOUNTER — APPOINTMENT (OUTPATIENT)
Dept: INTERNAL MEDICINE | Facility: CLINIC | Age: 70
End: 2025-01-15
Payer: MEDICARE

## 2025-01-15 VITALS
TEMPERATURE: 97.1 F | BODY MASS INDEX: 19.14 KG/M2 | HEART RATE: 83 BPM | SYSTOLIC BLOOD PRESSURE: 111 MMHG | RESPIRATION RATE: 16 BRPM | HEIGHT: 62 IN | OXYGEN SATURATION: 95 % | WEIGHT: 104 LBS | DIASTOLIC BLOOD PRESSURE: 66 MMHG

## 2025-01-15 DIAGNOSIS — C56.9 MALIGNANT NEOPLASM OF UNSPECIFIED OVARY: ICD-10-CM

## 2025-01-15 DIAGNOSIS — F17.210 NICOTINE DEPENDENCE, CIGARETTES, UNCOMPLICATED: ICD-10-CM

## 2025-01-15 DIAGNOSIS — I10 ESSENTIAL (PRIMARY) HYPERTENSION: ICD-10-CM

## 2025-01-15 DIAGNOSIS — Z09 ENCOUNTER FOR FOLLOW-UP EXAMINATION AFTER COMPLETED TREATMENT FOR CONDITIONS OTHER THAN MALIGNANT NEOPLASM: ICD-10-CM

## 2025-01-15 DIAGNOSIS — F41.9 ANXIETY DISORDER, UNSPECIFIED: ICD-10-CM

## 2025-01-15 PROCEDURE — 99495 TRANSJ CARE MGMT MOD F2F 14D: CPT

## 2025-01-30 NOTE — H&P PST ADULT - RESPIRATORY
no wheezes/no rales/no rhonchi/no respiratory distress/no use of accessory muscles/diminished breath sounds, L

## 2025-01-30 NOTE — H&P PST ADULT - PROBLEM SELECTOR PLAN 3
exam under anesthesia, total abdominal hysterectomy with bilateral salpingo-oophorectomy with omentectomy, and tumor debulking.

## 2025-01-30 NOTE — H&P PST ADULT - ASSESSMENT
Pt is a 69 years old female,  via , LMP at age 50, Pt seen today pre-op for exam under anesthesia, total abdominal hysterectomy with bilateral salpingo-oophorectomy with omentectomy, and tumor debulking.  Pt recently presented to [facility] ED for SOB, exertional dyspnea. 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 Today, Pt reports malaise, cough and  abdominal pain. Pt  denies Fever/chills, denies chest  pain, syncope, dizziness, lightheadedness, peripheral edema, change in bowel and  bladder and any other related issues. Pt scheduled for this surgery on 25 with Dr. Martinez. Surgery protocol reviewed with Pt today. Pt to follow up with PCP, cardiologist, PULMONOLOGIST for evaluation and clearance prior to this surgery  Patient instructed on NPO protocol   Patient instructed on Liquid protocol   Patient instructed to stop NSAID, all vitamins supplement, Fish oil, COQ 10,  herbals 5 days before this surgery.   Pt okay to take Tylenol as needed for pain   Patient will continue to take all his medications as prescribed    Patient instructed on infection prevention   Chlorhexidine scrub instructions provided  CAPRINI VTE 2.0 SCORE [CLOT updated 2019]    AGE RELATED RISK FACTORS                                                       MOBILITY RELATED FACTORS  [ ] Age 41-60 years                                            (1 Point)                    [ ] Bed rest                                                        (1 Point)  [ X] Age: 61-74 years                                           (2 Points)                  [ ] Plaster cast                                                   (2 Points)  [ ] Age= 75 years                                              (3 Points)                    [ ] Bed bound for more than 72 hours                 (2 Points)    DISEASE RELATED RISK FACTORS                                               GENDER SPECIFIC FACTORS  [ ] Edema in the lower extremities                       (1 Point)              [ ] Pregnancy                                                     (1 Point)  [ ] Varicose veins                                               (1 Point)                     [ ] Post-partum < 6 weeks                                   (1 Point)             [ ] BMI > 25 Kg/m2                                            (1 Point)                     [ ] Hormonal therapy  or oral contraception          (1 Point)                 [ ] Sepsis (in the previous month)                        (1 Point)               [ ] History of pregnancy complications                 (1 point)  [ ] Pneumonia or serious lung disease                                               [ ] Unexplained or recurrent                     (1 Point)           (in the previous month)                               (1 Point)  [ ] Abnormal pulmonary function test                     (1 Point)                 SURGERY RELATED RISK FACTORS  [ ] Acute myocardial infarction                              (1 Point)               [ ]  Section                                             (1 Point)  [ ] Congestive heart failure (in the previous month)  (1 Point)      [ ] Minor surgery                                                  (1 Point)   [ ] Inflammatory bowel disease                             (1 Point)               [ ] Arthroscopic surgery                                        (2 Points)  [ ] Central venous access                                      (2 Points)                [X ] General surgery lasting more than 45 minutes (2 points)  X[ ] Malignancy- Present or previous                   (2 Points)                [ ] Elective arthroplasty                                         (5 points)    [ ] Stroke (in the previous month)                          (5 Points)                                                                                                                                                           HEMATOLOGY RELATED FACTORS                                                 TRAUMA RELATED RISK FACTORS  [ ] Prior episodes of VTE                                     (3 Points)                [ ] Fracture of the hip, pelvis, or leg                       (5 Points)  [ ] Positive family history for VTE                         (3 Points)             [ ] Acute spinal cord injury (in the previous month)  (5 Points)  [ ] Prothrombin 76537 A                                     (3 Points)               [ ] Paralysis  (less than 1 month)                             (5 Points)  [ ] Factor V Leiden                                             (3 Points)                  [ ] Multiple Trauma within 1 month                        (5 Points)  [ ] Lupus anticoagulants                                     (3 Points)                                                           [ ] Anticardiolipin antibodies                               (3 Points)                                                       [ ] High homocysteine in the blood                      (3 Points)                                             [ ] Other congenital or acquired thrombophilia      (3 Points)                                                [ ] Heparin induced thrombocytopenia                  (3 Points)                                     Total Score [      6    ]  OPIOID RISK TOOL    ANAID EACH BOX THAT APPLIES AND ADD TOTALS AT THE END    FAMILY HISTORY OF SUBSTANCE ABUSE                 FEMALE         MALE                                                Alcohol                             [  ]1 pt          [  ]3pts                                               Illegal Durgs                     [  ]2 pts        [  ]3pts                                               Rx Drugs                           [  ]4 pts        [  ]4 pts    PERSONAL HISTORY OF SUBSTANCE ABUSE                                                                                          Alcohol                             [  ]3 pts       [  ]3 pts                                               Illegal Drugs                     [  ]4 pts        [  ]4 pts                                               Rx Drugs                           [  ]5 pts        [  ]5 pts    AGE BETWEEN 16-45 YEARS                                      [  ]1 pt         [  ]1 pt    HISTORY OF PREADOLESCENT   SEXUAL ABUSE                                                             [  ]3 pts        [  ]0pts    PSYCHOLOGICAL DISEASE                     ADD, OCD, Bipolar, Schizophrenia        [  ]2 pts         [  ]2 pts                      Depression                                               [  X]1 pt           [  ]1 pt         1  SCORING TOTAL   (add numbers and type here)              (*1**)                                     A score of 3 or lower indicated LOW risk for future opioid abuse  A score of 4 to 7 indicated moderate risk for future opioid abuse  A score of 8 or higher indicates a high risk for opioid abuse

## 2025-01-30 NOTE — H&P PST ADULT - HISTORY OF PRESENT ILLNESS
Pt is a 69 years old female seen today pre-op for exam under anesthesia, total abdominal hysterectomy with bilateral salpingo-oophorectomy with omentectomy, and tumor debulking.   Pt is a 69 years old female,  via , LMP at age 50, Pt seen today pre-op for exam under anesthesia, total abdominal hysterectomy with bilateral salpingo-oophorectomy with omentectomy, and tumor debulking.  Pt recently presented to [facility] ED for SOB, exertional dyspnea   Pt had chemotherapy from 2024 to   Pt is a 69 years old female,  via , LMP at age 50, Pt seen today pre-op for exam under anesthesia, total abdominal hysterectomy with bilateral salpingo-oophorectomy with omentectomy, and tumor debulking.  Pt recently presented to [facility] ED for SOB, exertional dyspnea. 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 Pt      Pt is a 69 years old female,  via , LMP at age 50, Pt seen today pre-op for exam under anesthesia, total abdominal hysterectomy with bilateral salpingo-oophorectomy with omentectomy, and tumor debulking.  Pt recently presented to [facility] ED for SOB, exertional dyspnea. 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 Today, Pt reports malaise, cough and  abdominal pain. Pt  denies Fever/chills, denies chest  pain, syncope, dizziness, lightheadedness, peripheral edema, change in bowel and  bladder and any other related issues. Pt scheduled for this surgery on 25 with Dr. Martinez

## 2025-01-30 NOTE — H&P PST ADULT - OTHER CARE PROVIDERS
Dr. Jorje Martinez Dr. Yemi Holloway [id number] [phone],  cardiologist Gildardo Dinh  [phone]., Pulmon. [phone]

## 2025-01-30 NOTE — H&P PST ADULT - GASTROINTESTINAL
no guarding/no rigidity/no organomegaly/no palpable milagros/no masses palpable/tender/distended details… normal active bowel sounds/no guarding/no rigidity/no organomegaly/no palpable milagros/no masses palpable/tender/distended/mass palpable

## 2025-01-30 NOTE — H&P PST ADULT - PROBLEM SELECTOR PROBLEM 3
PRINCIPAL DISCHARGE DIAGNOSIS  Diagnosis: Renal calculus, left  Assessment and Plan of Treatment: Follow-up with urologist within 10 days  Finish course of antibiotics.  Follow-up with your primary care doctor within 1 week.        SECONDARY DISCHARGE DIAGNOSES  Diagnosis: Pyelonephritis  Assessment and Plan of Treatment: Finish course of antibiotics.  Follow-up with urologist within 10 days  Follow-up with your primary care doctor within 1 week.      
Malignant neoplasm of ovary

## 2025-01-30 NOTE — H&P PST ADULT - EKG AND INTERPRETATION
normal sinus rhythm 67 bpm, possible left atrial enlargement with repolarization abnormality., no acute change. EKG pending official reading

## 2025-01-30 NOTE — H&P PST ADULT - GENITOURINARY COMMENTS
I have reviewed discharge instructions with the parent. The parent verbalized understanding.  Pt left ED in stable condition with no complaints voiced and no distress noted deferred

## 2025-01-31 PROBLEM — E87.6 HYPOKALEMIA: Status: ACTIVE | Noted: 2025-01-01

## 2025-01-31 PROBLEM — C56.9 MALIGNANT NEOPLASM OF UNSPECIFIED OVARY: Chronic | Status: ACTIVE | Noted: 2025-01-30

## 2025-01-31 PROBLEM — Z01.818 PREOPERATIVE EXAMINATION: Status: ACTIVE | Noted: 2024-01-01

## 2025-02-03 PROBLEM — Z01.810 PRE-OPERATIVE CARDIOVASCULAR EXAMINATION: Status: ACTIVE | Noted: 2025-01-01

## 2025-02-04 PROBLEM — J43.2 CENTRILOBULAR EMPHYSEMA: Status: ACTIVE | Noted: 2025-01-01

## 2025-02-04 PROBLEM — Z01.811 PREOP RESPIRATORY EXAM: Status: ACTIVE | Noted: 2025-01-01

## 2025-02-12 ENCOUNTER — APPOINTMENT (OUTPATIENT)
Dept: CARDIOLOGY | Facility: CLINIC | Age: 70
End: 2025-02-12

## 2025-02-13 ENCOUNTER — APPOINTMENT (OUTPATIENT)
Dept: PULMONOLOGY | Facility: CLINIC | Age: 70
End: 2025-02-13

## 2025-02-22 NOTE — H&P PST ADULT - PROBLEM SELECTOR PROBLEM 3
Bronchitis, No Antibiotics (Adult)  Bronchitis is inflammation and swelling of the air passages (bronchial tubes) in your lungs. This is often caused by an infection. Your bronchitis was caused by a virus. Symptoms include a dry, hacking cough that is worse at night. The cough may bring up yellow-green mucus. You may also feel short of breath or wheeze. Other symptoms may include tiredness, chest discomfort, fever, and chills.   This illness can be spread to other people in the first few days. It is spread through the air by coughing and sneezing. It is also spread by direct contact. This means touching the sick person and then touching your own eyes, nose, or mouth.   Bronchitis that is caused by a virus is often not treated with antibiotic medicine. Instead, medicines may be given to help relieve symptoms. Symptoms can last up to 2 weeks. The cough may last much longer.   Home care  Follow these guidelines when caring for yourself at home:  If your symptoms are severe, rest at home for the first 2 to 3 days. When you go back to your daily tasks, don't let yourself get too tired.  Do not smoke. Stay away from secondhand smoke.  You may use over-the-counter medicine to control fever or pain. Or use another pain medicine as prescribed. If you have chronic liver or kidney disease or have ever had a stomach ulcer or bleeding in your stomach or intestines, talk with your healthcare provider before using these medicines. Also talk to your provider if you are taking medicine to prevent blood clots. Aspirin should never be taken by anyone under age 18 who has a virus or fever. It may cause severe liver or brain damage.  Your body needs a lot of fluids now. Drink 6 to 8 glasses of fluids per day. This includes water, soft drinks, sports drinks, juices, tea, or soup. Extra fluids will help loosen mucus in your nose and lungs.  Your appetite may be low. A light diet is fine.  Over-the-counter cough, cold, and sore-throat  medicines will not shorten the length of the illness. But they may help to reduce your symptoms. Don't use decongestants if you have high blood pressure.  Follow-up care  Follow up with your healthcare provider, or as advised. If you had an X-ray or ECG (electrocardiogram), a specialist will review it. You will be told of any results that may affect your care.   Ask your healthcare provider about the pneumococcal vaccines and a yearly flu shot. There are 2 kinds of pneumococcal vaccines. You may need both. You’re at higher risk of lung infection if any of these apply to you:   You are age 65 or older  You have a chronic lung disease  You have condition that affects your immune system  You smoke  When to get medical care  Call your healthcare provider right away if you have any of these:   Fever of 100.4°F (38°C) or higher  Coughing up more mucus  Facial pain or ear pain  Mild weakness, drowsiness, headache, or a stiff neck  Call 911  Call 911 if any of these occur:   Coughing up blood  Weakness, drowsiness, headache, or stiff neck that get worse  Trouble breathing, wheezing, or pain with breathing  Lips or skin looks blue, purple, or gray in color  Feeling of doom  Yashira last reviewed this educational content on 11/1/2021  © 6312-8002 [employer]. All rights reserved. This information is not intended as a substitute for professional medical care. Always follow your healthcare professional's instructions.         Currently smokes tobacco

## 2025-02-25 ENCOUNTER — APPOINTMENT (OUTPATIENT)
Dept: CARDIOLOGY | Facility: CLINIC | Age: 70
End: 2025-02-25

## 2025-03-01 NOTE — H&P PST ADULT - BLOOD TRANSFUSION, PREVIOUS, PROFILE
Pt pulled out IV stating, \"I don't want this thing in me anymore and you guys aren't going to put one in me ever again. You are all trying to kill me.\" Night hospitalist notified. No new orders.    no

## 2025-04-03 ENCOUNTER — APPOINTMENT (OUTPATIENT)
Dept: PULMONOLOGY | Facility: CLINIC | Age: 70
End: 2025-04-03